# Patient Record
Sex: MALE | Race: WHITE | Employment: OTHER | URBAN - METROPOLITAN AREA
[De-identification: names, ages, dates, MRNs, and addresses within clinical notes are randomized per-mention and may not be internally consistent; named-entity substitution may affect disease eponyms.]

---

## 2017-03-31 DIAGNOSIS — F33.0 MAJOR DEPRESSIVE DISORDER, RECURRENT EPISODE, MILD (HCC): ICD-10-CM

## 2017-04-03 RX ORDER — CITALOPRAM 20 MG/1
TABLET ORAL
Qty: 90 TABLET | Refills: 3 | OUTPATIENT
Start: 2017-04-03

## 2017-07-13 ENCOUNTER — OFFICE VISIT (OUTPATIENT)
Dept: FAMILY MEDICINE CLINIC | Age: 57
End: 2017-07-13

## 2017-07-13 VITALS
SYSTOLIC BLOOD PRESSURE: 94 MMHG | WEIGHT: 255.4 LBS | HEIGHT: 70 IN | HEART RATE: 97 BPM | BODY MASS INDEX: 36.56 KG/M2 | TEMPERATURE: 101.2 F | OXYGEN SATURATION: 97 % | DIASTOLIC BLOOD PRESSURE: 70 MMHG

## 2017-07-13 DIAGNOSIS — R05.9 COUGH: ICD-10-CM

## 2017-07-13 DIAGNOSIS — R50.9 FEVER, UNSPECIFIED FEVER CAUSE: Primary | ICD-10-CM

## 2017-07-13 PROCEDURE — 1036F TOBACCO NON-USER: CPT | Performed by: FAMILY MEDICINE

## 2017-07-13 PROCEDURE — G8417 CALC BMI ABV UP PARAM F/U: HCPCS | Performed by: FAMILY MEDICINE

## 2017-07-13 PROCEDURE — G8427 DOCREV CUR MEDS BY ELIG CLIN: HCPCS | Performed by: FAMILY MEDICINE

## 2017-07-13 PROCEDURE — 3017F COLORECTAL CA SCREEN DOC REV: CPT | Performed by: FAMILY MEDICINE

## 2017-07-13 PROCEDURE — 99213 OFFICE O/P EST LOW 20 MIN: CPT | Performed by: FAMILY MEDICINE

## 2017-07-13 RX ORDER — ACETAMINOPHEN 325 MG/1
650 TABLET ORAL ONCE
Status: COMPLETED | OUTPATIENT
Start: 2017-07-13 | End: 2017-07-13

## 2017-07-13 RX ORDER — IBUPROFEN 600 MG/1
600 TABLET ORAL EVERY 6 HOURS PRN
Qty: 20 TABLET | Refills: 0 | Status: SHIPPED | OUTPATIENT
Start: 2017-07-13 | End: 2019-03-11 | Stop reason: ALTCHOICE

## 2017-07-13 RX ORDER — ACETAMINOPHEN 325 MG/1
1000 TABLET ORAL ONCE
Status: DISCONTINUED | OUTPATIENT
Start: 2017-07-13 | End: 2017-07-13

## 2017-07-13 RX ORDER — DOXYCYCLINE HYCLATE 100 MG
100 TABLET ORAL 2 TIMES DAILY
Qty: 20 TABLET | Refills: 0 | Status: SHIPPED | OUTPATIENT
Start: 2017-07-13 | End: 2017-07-23

## 2017-07-13 RX ADMIN — ACETAMINOPHEN 650 MG: 325 TABLET ORAL at 16:28

## 2017-07-13 ASSESSMENT — PATIENT HEALTH QUESTIONNAIRE - PHQ9
SUM OF ALL RESPONSES TO PHQ9 QUESTIONS 1 & 2: 0
2. FEELING DOWN, DEPRESSED OR HOPELESS: 0
1. LITTLE INTEREST OR PLEASURE IN DOING THINGS: 0
SUM OF ALL RESPONSES TO PHQ QUESTIONS 1-9: 0

## 2017-07-13 ASSESSMENT — ENCOUNTER SYMPTOMS
BACK PAIN: 0
EYE DISCHARGE: 0
COUGH: 1
ABDOMINAL PAIN: 0
NAUSEA: 0
WHEEZING: 0
SORE THROAT: 0
VOMITING: 0
DIARRHEA: 0
SINUS PRESSURE: 0
SHORTNESS OF BREATH: 0

## 2017-07-17 ENCOUNTER — TELEPHONE (OUTPATIENT)
Dept: FAMILY MEDICINE CLINIC | Age: 57
End: 2017-07-17

## 2017-07-26 DIAGNOSIS — F33.0 MAJOR DEPRESSIVE DISORDER, RECURRENT EPISODE, MILD (HCC): ICD-10-CM

## 2017-07-26 RX ORDER — CITALOPRAM 20 MG/1
20 TABLET ORAL DAILY
Qty: 90 TABLET | Refills: 0 | Status: SHIPPED | OUTPATIENT
Start: 2017-07-26 | End: 2017-10-16 | Stop reason: SDUPTHER

## 2017-09-14 ENCOUNTER — OFFICE VISIT (OUTPATIENT)
Dept: FAMILY MEDICINE CLINIC | Age: 57
End: 2017-09-14

## 2017-09-14 VITALS
HEART RATE: 62 BPM | WEIGHT: 262 LBS | SYSTOLIC BLOOD PRESSURE: 108 MMHG | OXYGEN SATURATION: 98 % | BODY MASS INDEX: 36.54 KG/M2 | DIASTOLIC BLOOD PRESSURE: 76 MMHG | TEMPERATURE: 98.5 F

## 2017-09-14 DIAGNOSIS — J40 BRONCHITIS: Primary | ICD-10-CM

## 2017-09-14 PROCEDURE — 1036F TOBACCO NON-USER: CPT | Performed by: NURSE PRACTITIONER

## 2017-09-14 PROCEDURE — 3017F COLORECTAL CA SCREEN DOC REV: CPT | Performed by: NURSE PRACTITIONER

## 2017-09-14 PROCEDURE — 99213 OFFICE O/P EST LOW 20 MIN: CPT | Performed by: NURSE PRACTITIONER

## 2017-09-14 PROCEDURE — G8417 CALC BMI ABV UP PARAM F/U: HCPCS | Performed by: NURSE PRACTITIONER

## 2017-09-14 PROCEDURE — G8427 DOCREV CUR MEDS BY ELIG CLIN: HCPCS | Performed by: NURSE PRACTITIONER

## 2017-09-14 RX ORDER — BENZONATATE 100 MG/1
100 CAPSULE ORAL 3 TIMES DAILY PRN
Qty: 20 CAPSULE | Refills: 0 | Status: SHIPPED | OUTPATIENT
Start: 2017-09-14 | End: 2017-09-21

## 2017-09-14 RX ORDER — METHYLPREDNISOLONE 4 MG/1
TABLET ORAL
Qty: 1 KIT | Refills: 0 | Status: SHIPPED | OUTPATIENT
Start: 2017-09-14 | End: 2017-09-20

## 2017-09-14 ASSESSMENT — ENCOUNTER SYMPTOMS
HEMOPTYSIS: 0
ABDOMINAL DISTENTION: 0
NAUSEA: 0
EYE PAIN: 0
SINUS PRESSURE: 0
RHINORRHEA: 0
DIARRHEA: 0
TROUBLE SWALLOWING: 0
WHEEZING: 1
SHORTNESS OF BREATH: 0
COUGH: 1
HEARTBURN: 0
CHEST TIGHTNESS: 0
SORE THROAT: 1
ABDOMINAL PAIN: 0

## 2018-02-06 ENCOUNTER — OFFICE VISIT (OUTPATIENT)
Dept: FAMILY MEDICINE CLINIC | Age: 58
End: 2018-02-06

## 2018-02-06 ENCOUNTER — TELEPHONE (OUTPATIENT)
Dept: SURGERY | Age: 58
End: 2018-02-06

## 2018-02-06 VITALS
DIASTOLIC BLOOD PRESSURE: 70 MMHG | OXYGEN SATURATION: 96 % | BODY MASS INDEX: 37.38 KG/M2 | HEIGHT: 71 IN | WEIGHT: 267 LBS | HEART RATE: 76 BPM | RESPIRATION RATE: 14 BRPM | SYSTOLIC BLOOD PRESSURE: 122 MMHG

## 2018-02-06 DIAGNOSIS — F33.0 MAJOR DEPRESSIVE DISORDER, RECURRENT EPISODE, MILD (HCC): ICD-10-CM

## 2018-02-06 DIAGNOSIS — K42.9 UMBILICAL HERNIA WITHOUT OBSTRUCTION AND WITHOUT GANGRENE: Primary | ICD-10-CM

## 2018-02-06 PROCEDURE — G8484 FLU IMMUNIZE NO ADMIN: HCPCS | Performed by: FAMILY MEDICINE

## 2018-02-06 PROCEDURE — 1036F TOBACCO NON-USER: CPT | Performed by: FAMILY MEDICINE

## 2018-02-06 PROCEDURE — 99213 OFFICE O/P EST LOW 20 MIN: CPT | Performed by: FAMILY MEDICINE

## 2018-02-06 PROCEDURE — 3017F COLORECTAL CA SCREEN DOC REV: CPT | Performed by: FAMILY MEDICINE

## 2018-02-06 PROCEDURE — G8427 DOCREV CUR MEDS BY ELIG CLIN: HCPCS | Performed by: FAMILY MEDICINE

## 2018-02-06 PROCEDURE — G8417 CALC BMI ABV UP PARAM F/U: HCPCS | Performed by: FAMILY MEDICINE

## 2018-02-06 RX ORDER — CITALOPRAM 20 MG/1
20 TABLET ORAL DAILY
Qty: 90 TABLET | Refills: 3 | Status: SHIPPED | OUTPATIENT
Start: 2018-02-06 | End: 2019-03-11 | Stop reason: SDUPTHER

## 2018-02-06 NOTE — PROGRESS NOTES
Plan:   Patient is now having symptomatic umbilical hernia with his heavy lifting during his current projects. We discussed the risks of symptomatic umbilical hernias. CT has been ordered as well as referral to surgery for evaluation on surgical timing. In the meanwhile he will try and avoid any heavy lifting and report any increased cough or bowel habits straining to minimize the risk of strangulation of the hernia. The Celexa is doing well for his moods we will continue his current medication. He'll also work on his weight loss that he is gaining weight since last time he was here. Diagnosis Assessment and Associated Orders:    1. Umbilical hernia without obstruction and without gangrene  CT ABDOMEN PELVIS W IV CONTRAST Additional Contrast? Radiologist Recommendation    Carson Langley MD   2. Major depressive disorder, recurrent episode, mild (HCC)  citalopram (CELEXA) 20 MG tablet   3. Obesity, Class II, BMI 35-39.9, with comorbidity ([facility] [address]             All patient questions answered. Pt voiced understanding. Return in about 1 year (around 2/6/2019) for moods or sooner if needed . -----------------------------------------------------------------------------------------------            Chief Complaint   Patient presents with    Medication Refill    Hernia     Patient reports that when he is doing his heavy lifting he is finding some pain in his him up umbilical hernia that radiates up towards his mid chest.  No vomiting, nausea, bloody stools, diarrhea with the symptoms. The pain is very transient for a few seconds and as soon as he stops lifting the pain goes away. The pain does not wake him up in the middle of the night. He is also here for refills for Celexa. His moods are doing well on his current dose. He denies any suicidal or homicidal ideation. ROS: Pertinent items are noted in HPI. All other ROS negative     reports that he has never smoked.  He has quit using smokeless tobacco.   reports that he drinks about 4.8 - 7.2 oz of alcohol per week . Physical Exam   Nursing note reviewed  /70 (Site: Left Arm, Position: Sitting, Cuff Size: Large Adult)   Pulse 76   Resp 14   Ht 5' 11\" (1.803 m)   Wt 267 lb (121.1 kg)   SpO2 96%   BMI 37.24 kg/m²   BP Readings from Last 3 Encounters:   02/06/18 122/70   09/14/17 108/76   07/13/17 94/70     Wt Readings from Last 3 Encounters:   02/06/18 267 lb (121.1 kg)   12/14/17 265 lb (120.2 kg)   09/14/17 262 lb (118.8 kg)     Physical Exam   Constitutional: He is oriented to person, place, and time. He appears well-developed and well-nourished. HENT:   Mouth/Throat: Oropharynx is clear and moist.   Eyes: Conjunctivae and EOM are normal. Pupils are equal, round, and reactive to light. Neck: Neck supple. Cardiovascular: Normal rate, regular rhythm and normal heart sounds. Pulmonary/Chest: Effort normal and breath sounds normal.   Abdominal: Soft. Bowel sounds are normal.       Neurological: He is alert and oriented to person, place, and time. Skin: Skin is warm and dry. Psychiatric: He has a normal mood and affect. His speech is normal and behavior is normal. His mood appears not anxious.          Assessment and Plan: See above

## 2018-02-17 ENCOUNTER — HOSPITAL ENCOUNTER (OUTPATIENT)
Dept: CT IMAGING | Age: 58
Discharge: HOME | End: 2018-02-17
Attending: FAMILY MEDICINE | Admitting: FAMILY MEDICINE

## 2018-02-17 DIAGNOSIS — K42.9 UMBILICAL HERNIA WITHOUT OBSTRUCTION AND WITHOUT GANGRENE: ICD-10-CM

## 2018-02-17 DIAGNOSIS — K42.9 UMBILICAL HERNIA WITHOUT OBSTRUCTION OR GANGRENE: ICD-10-CM

## 2018-02-23 PROBLEM — K57.30 DIVERTICULOSIS OF LARGE INTESTINE: Status: ACTIVE | Noted: 2018-02-23

## 2018-02-23 PROBLEM — K76.0 HEPATIC STEATOSIS: Status: ACTIVE | Noted: 2018-02-23

## 2018-03-07 ENCOUNTER — OFFICE VISIT (OUTPATIENT)
Dept: SURGERY | Age: 58
End: 2018-03-07

## 2018-03-07 VITALS
HEIGHT: 71 IN | SYSTOLIC BLOOD PRESSURE: 122 MMHG | BODY MASS INDEX: 37.38 KG/M2 | DIASTOLIC BLOOD PRESSURE: 84 MMHG | WEIGHT: 266.98 LBS

## 2018-03-07 DIAGNOSIS — K42.9 UMBILICAL HERNIA WITHOUT OBSTRUCTION AND WITHOUT GANGRENE: Primary | ICD-10-CM

## 2018-03-07 PROCEDURE — G8417 CALC BMI ABV UP PARAM F/U: HCPCS | Performed by: SURGERY

## 2018-03-07 PROCEDURE — 99204 OFFICE O/P NEW MOD 45 MIN: CPT | Performed by: SURGERY

## 2018-03-07 PROCEDURE — G8427 DOCREV CUR MEDS BY ELIG CLIN: HCPCS | Performed by: SURGERY

## 2018-03-07 PROCEDURE — G8484 FLU IMMUNIZE NO ADMIN: HCPCS | Performed by: SURGERY

## 2018-03-07 PROCEDURE — 3017F COLORECTAL CA SCREEN DOC REV: CPT | Performed by: SURGERY

## 2018-03-07 PROCEDURE — 1036F TOBACCO NON-USER: CPT | Performed by: SURGERY

## 2018-03-07 ASSESSMENT — ENCOUNTER SYMPTOMS
CHOKING: 0
APNEA: 0
BACK PAIN: 0
SORE THROAT: 0
PHOTOPHOBIA: 0
ABDOMINAL PAIN: 1
EYE ITCHING: 0
STRIDOR: 0
CONSTIPATION: 0
COLOR CHANGE: 0
EYE REDNESS: 0
RECTAL PAIN: 0
ANAL BLEEDING: 0

## 2018-03-07 NOTE — PROGRESS NOTES
 Marital status:      Spouse name: N/A    Number of children: N/A    Years of education: N/A     Occupational History    retired [address] D     32 years [employer] (retired 2015)     Social History Main Topics    Smoking status: Never Smoker    Smokeless tobacco: Former User    Alcohol use 4.8 - 7.2 oz/week     8 - 12 Cans of beer per week      Comment: 3-4 cans soda daily    Drug use: No    Sexual activity: Yes     Partners: Female     Other Topics Concern    Not on file     Social History Narrative    Lives with wife, twin sons               Current Outpatient Prescriptions   Medication Sig Dispense Refill    Misc Natural Products (GLUCOSAMINE CHOND COMPLEX/MSM PO) Take by mouth      citalopram (CELEXA) 20 MG tablet Take 1 tablet by mouth daily 90 tablet 3    albuterol sulfate HFA (PROVENTIL HFA) 108 (90 BASE) MCG/ACT inhaler Inhale 2 puffs into the lungs every 4 hours as needed for Wheezing Can substitute albuterol inhaler per formulary 1 Inhaler 6    fish oil-omega-3 fatty acids 1000 MG capsule Take 2 g by mouth daily. (Takes 3-4 per day)      ibuprofen (ADVIL;MOTRIN) 600 MG tablet Take 1 tablet by mouth every 6 hours as needed for Pain 20 tablet 0     No current facility-administered medications for this visit. No Known Allergies    Review of Systems:         Review of Systems   Constitutional: Negative for chills and fever. HENT: Negative for ear pain, mouth sores, sore throat and tinnitus. Eyes: Negative for photophobia, redness and itching. Respiratory: Negative for apnea, choking and stridor. Cardiovascular: Negative for chest pain and palpitations. Gastrointestinal: Positive for abdominal pain. Negative for anal bleeding, constipation and rectal pain. Endocrine: Negative for polydipsia. Genitourinary: Negative for enuresis, flank pain and hematuria. Musculoskeletal: Negative for back pain, joint swelling and myalgias.    Skin: Negative for color change and pallor. Allergic/Immunologic: Negative for environmental allergies. Neurological: Negative for syncope and speech difficulty. Psychiatric/Behavioral: Negative for confusion and hallucinations. OBJECTIVE:  Physical Exam:    /84   Ht 5' 10.98\" (1.803 m)   Wt 266 lb 15.6 oz (121.1 kg)   BMI 37.25 kg/m²      Physical Exam   Constitutional: He is oriented to person, place, and time. He appears well-developed and well-nourished. HENT:   Head: Normocephalic. Eyes: Pupils are equal, round, and reactive to light. Neck: Normal range of motion. Neck supple. Cardiovascular: Normal rate. Pulmonary/Chest: Effort normal.   Abdominal: Soft. He exhibits no distension and no mass. There is tenderness. There is no rebound and no guarding. Musculoskeletal: Normal range of motion. Neurological: He is alert and oriented to person, place, and time. Skin: Skin is warm. Psychiatric: He has a normal mood and affect. ASSESSMENT:  1. Umbilical hernia without obstruction and without gangrene          PLAN:  Treatment:  Will proceed with open umbilical hernia repair. Patient counseled on risks, benefits, and alternatives of treatment plan at length. Patient states an understanding and willingness to proceed with plan. No orders of the defined types were placed in this encounter. No orders of the defined types were placed in this encounter. Follow Up:  Return in about 2 weeks (around 3/21/2018).       Olu Doty MD

## 2018-03-13 ENCOUNTER — TELEPHONE (OUTPATIENT)
Dept: SURGERY | Age: 58
End: 2018-03-13

## 2018-03-13 NOTE — TELEPHONE ENCOUNTER
Scheduled Open Umbical Hernia Repair at [facility]  Surgery: 4/18/18 at 0800. PAT: by phone. P/O appt: 4/30/18 at 1000 at [address]: NPO after midnight. Called and left message for Slime Isaacs to call back.  sent.

## 2018-03-28 ENCOUNTER — TELEPHONE (OUTPATIENT)
Dept: SURGERY | Age: 58
End: 2018-03-28

## 2018-04-30 ENCOUNTER — OFFICE VISIT (OUTPATIENT)
Dept: SURGERY | Age: 58
End: 2018-04-30

## 2018-04-30 VITALS
HEIGHT: 71 IN | WEIGHT: 263.89 LBS | SYSTOLIC BLOOD PRESSURE: 124 MMHG | DIASTOLIC BLOOD PRESSURE: 88 MMHG | HEART RATE: 85 BPM | BODY MASS INDEX: 36.94 KG/M2

## 2018-04-30 DIAGNOSIS — K42.9 UMBILICAL HERNIA WITHOUT OBSTRUCTION AND WITHOUT GANGRENE: Primary | ICD-10-CM

## 2018-04-30 PROCEDURE — 99024 POSTOP FOLLOW-UP VISIT: CPT | Performed by: PHYSICIAN ASSISTANT

## 2018-05-17 ENCOUNTER — OFFICE VISIT (OUTPATIENT)
Dept: SURGERY | Age: 58
End: 2018-05-17

## 2018-05-17 VITALS
WEIGHT: 263.89 LBS | DIASTOLIC BLOOD PRESSURE: 88 MMHG | HEIGHT: 71 IN | SYSTOLIC BLOOD PRESSURE: 120 MMHG | BODY MASS INDEX: 36.94 KG/M2

## 2018-05-17 DIAGNOSIS — K42.9 UMBILICAL HERNIA WITHOUT OBSTRUCTION AND WITHOUT GANGRENE: ICD-10-CM

## 2018-05-17 DIAGNOSIS — Z12.11 COLON CANCER SCREENING: Primary | ICD-10-CM

## 2018-05-17 PROCEDURE — 99999 PR OFFICE/OUTPT VISIT,PROCEDURE ONLY: CPT | Performed by: PHYSICIAN ASSISTANT

## 2018-05-17 PROCEDURE — 99024 POSTOP FOLLOW-UP VISIT: CPT | Performed by: PHYSICIAN ASSISTANT

## 2018-05-17 RX ORDER — SODIUM, POTASSIUM,MAG SULFATES 17.5-3.13G
1 SOLUTION, RECONSTITUTED, ORAL ORAL ONCE
Qty: 2 BOTTLE | Refills: 0 | Status: SHIPPED | OUTPATIENT
Start: 2018-05-17 | End: 2018-05-17

## 2018-05-17 ASSESSMENT — ENCOUNTER SYMPTOMS
DOUBLE VISION: 0
ORTHOPNEA: 0
COUGH: 0
SPUTUM PRODUCTION: 0
PHOTOPHOBIA: 0
HEARTBURN: 0
NAUSEA: 0
HEMOPTYSIS: 0
BACK PAIN: 0
VOMITING: 0
BLURRED VISION: 0

## 2018-06-16 PROBLEM — Z12.11 COLON CANCER SCREENING: Status: RESOLVED | Noted: 2018-05-17 | Resolved: 2018-06-16

## 2018-09-19 ENCOUNTER — OFFICE VISIT (OUTPATIENT)
Dept: SURGERY | Age: 58
End: 2018-09-19

## 2018-09-19 ENCOUNTER — HOSPITAL ENCOUNTER (OUTPATIENT)
Dept: OTHER | Age: 58
Discharge: HOME | End: 2018-09-19
Attending: SURGERY | Admitting: SURGERY

## 2018-09-19 VITALS
HEIGHT: 71 IN | WEIGHT: 260 LBS | SYSTOLIC BLOOD PRESSURE: 122 MMHG | DIASTOLIC BLOOD PRESSURE: 88 MMHG | BODY MASS INDEX: 36.4 KG/M2 | HEART RATE: 80 BPM

## 2018-09-19 DIAGNOSIS — M79.601 PAIN OF RIGHT UPPER EXTREMITY: ICD-10-CM

## 2018-09-19 DIAGNOSIS — D17.21 LIPOMA OF RIGHT UPPER EXTREMITY: Primary | ICD-10-CM

## 2018-09-19 PROCEDURE — 11402 EXC TR-EXT B9+MARG 1.1-2 CM: CPT | Performed by: SURGERY

## 2018-10-09 ASSESSMENT — ENCOUNTER SYMPTOMS
STRIDOR: 0
CONSTIPATION: 0
SORE THROAT: 0
RECTAL PAIN: 0
ANAL BLEEDING: 0
CHOKING: 0
COLOR CHANGE: 0
APNEA: 0
EYE REDNESS: 0
EYE ITCHING: 0
PHOTOPHOBIA: 0
BACK PAIN: 0

## 2018-11-07 ENCOUNTER — TELEPHONE (OUTPATIENT)
Dept: FAMILY MEDICINE CLINIC | Age: 58
End: 2018-11-07

## 2018-11-07 ENCOUNTER — HOSPITAL ENCOUNTER (EMERGENCY)
Age: 58
Discharge: HOME | End: 2018-11-07
Attending: EMERGENCY MEDICINE
Payer: COMMERCIAL

## 2018-11-07 VITALS
HEART RATE: 50 BPM | SYSTOLIC BLOOD PRESSURE: 146 MMHG | DIASTOLIC BLOOD PRESSURE: 92 MMHG | TEMPERATURE: 96.5 F | RESPIRATION RATE: 16 BRPM | OXYGEN SATURATION: 97 %

## 2018-11-07 DIAGNOSIS — S39.012A ACUTE MYOFASCIAL STRAIN OF LUMBAR REGION, INITIAL ENCOUNTER: Primary | ICD-10-CM

## 2018-11-07 DIAGNOSIS — S33.5XXA SPRAIN OF LOW BACK, INITIAL ENCOUNTER: Primary | ICD-10-CM

## 2018-11-07 PROCEDURE — 6370000000 HC RX 637 (ALT 250 FOR IP): Performed by: EMERGENCY MEDICINE

## 2018-11-07 PROCEDURE — 6360000002 HC RX W HCPCS: Performed by: EMERGENCY MEDICINE

## 2018-11-07 PROCEDURE — 99283 EMERGENCY DEPT VISIT LOW MDM: CPT

## 2018-11-07 PROCEDURE — 96372 THER/PROPH/DIAG INJ SC/IM: CPT

## 2018-11-07 RX ORDER — DIAZEPAM 10 MG/1
10 TABLET ORAL EVERY 8 HOURS PRN
Qty: 10 TABLET | Refills: 0 | Status: SHIPPED | OUTPATIENT
Start: 2018-11-07 | End: 2018-11-17

## 2018-11-07 RX ORDER — HYDROCODONE BITARTRATE AND ACETAMINOPHEN 5; 325 MG/1; MG/1
1-2 TABLET ORAL EVERY 4 HOURS PRN
Qty: 15 TABLET | Refills: 0 | Status: SHIPPED | OUTPATIENT
Start: 2018-11-07 | End: 2018-11-10

## 2018-11-07 RX ORDER — ACETAMINOPHEN 325 MG/1
650 TABLET ORAL EVERY 6 HOURS PRN
COMMUNITY
End: 2022-02-15

## 2018-11-07 RX ORDER — NAPROXEN 500 MG/1
500 TABLET ORAL 2 TIMES DAILY PRN
Qty: 20 TABLET | Refills: 0 | Status: SHIPPED | OUTPATIENT
Start: 2018-11-07 | End: 2018-11-12 | Stop reason: SDUPTHER

## 2018-11-07 RX ORDER — KETOROLAC TROMETHAMINE 30 MG/ML
60 INJECTION, SOLUTION INTRAMUSCULAR; INTRAVENOUS ONCE
Status: COMPLETED | OUTPATIENT
Start: 2018-11-07 | End: 2018-11-07

## 2018-11-07 RX ORDER — MORPHINE SULFATE 10 MG/ML
6 INJECTION, SOLUTION INTRAMUSCULAR; INTRAVENOUS ONCE
Status: COMPLETED | OUTPATIENT
Start: 2018-11-07 | End: 2018-11-07

## 2018-11-07 RX ORDER — DIAZEPAM 5 MG/1
5 TABLET ORAL ONCE
Status: COMPLETED | OUTPATIENT
Start: 2018-11-07 | End: 2018-11-07

## 2018-11-07 RX ADMIN — MORPHINE SULFATE 6 MG: 10 INJECTION INTRAMUSCULAR; INTRAVENOUS; SUBCUTANEOUS at 13:35

## 2018-11-07 RX ADMIN — KETOROLAC TROMETHAMINE 60 MG: 30 INJECTION, SOLUTION INTRAMUSCULAR at 12:06

## 2018-11-07 RX ADMIN — DIAZEPAM 5 MG: 5 TABLET ORAL at 12:07

## 2018-11-07 ASSESSMENT — PAIN SCALES - GENERAL
PAINLEVEL_OUTOF10: 2
PAINLEVEL_OUTOF10: 7

## 2018-11-07 NOTE — ED NOTES
Patient given AVS, discharge instructions and prescriptions. Verbalizes understanding no further needs identified at this time.      Wilber Tobias RN  11/07/18 0853

## 2018-11-07 NOTE — ED NOTES
States he is feeling better. When attempting to stand at bedside, pts c/o pain and right leg starts to buckle. Dr Willie Parsons notified.      Silvano Maldonado RN  11/07/18 [id number]

## 2018-11-07 NOTE — ED PROVIDER NOTES
of Onset    Cancer Mother         cervical primary with spread    Cancer Father 79        brain-     Diabetes Father     Heart Disease Father     High Blood Pressure Father     High Cholesterol Father     Mental Illness Sister     Migraines Sister     Seizures Son     Cancer Maternal Aunt     High Blood Pressure Paternal Grandmother     Stroke Paternal Grandmother     Allergies Daughter     Asthma Daughter     Depression Daughter     Cancer Paternal Grandfather         leukemia     Social History     Social History    Marital status:      Spouse name: N/A    Number of children: N/A    Years of education: N/A     Occupational History    retired [address] [facility] D D     32 years [employer] (retired 2015)     Social History Main Topics    Smoking status: Never Smoker    Smokeless tobacco: Former User    Alcohol use 4.8 - 7.2 oz/week     8 - 12 Cans of beer per week      Comment: 3-4 cans soda daily    Drug use: No    Sexual activity: Yes     Partners: Female     Other Topics Concern    Not on file     Social History Narrative    Lives with wife, twin sons             No current facility-administered medications for this encounter. Current Outpatient Prescriptions   Medication Sig Dispense Refill    acetaminophen (TYLENOL) 325 MG tablet Take 650 mg by mouth every 6 hours as needed for Pain      diazepam (VALIUM) 10 MG tablet Take 1 tablet by mouth every 8 hours as needed (muscle spasm) for up to 10 days. . 10 tablet 0    naproxen (NAPROSYN) 500 MG tablet Take 1 tablet by mouth 2 times daily as needed for Pain 20 tablet 0    HYDROcodone-acetaminophen (NORCO) 5-325 MG per tablet Take 1-2 tablets by mouth every 4 hours as needed for Pain for up to 3 days. . 15 tablet 0    Misc Natural Products (GLUCOSAMINE CHOND COMPLEX/MSM PO) Take by mouth      citalopram (CELEXA) 20 MG tablet Take 1 tablet by mouth daily 90 tablet 3    ibuprofen (ADVIL;MOTRIN) 600 MG tablet injury/pathology, such as, but not limited to, cauda equina syndrome, acute spinal cord pathology, epidural abscess, obstructive uropathy, or UTI/pyelonephritis. I do not believe that further testing or imaging is warranted at this time. The likelihood of other entities in the differential is insufficient to justify any further testing for them. This was explained to the patient. The patient was advised that persistent or worsening symptoms would require further evaluation. Patient is given Toradol and Valium initially without complete relief. Following morphine, patient states his symptoms are much improved and he is not able to ambulate. Does report some ongoing discomfort. I do believe the patient is a good candidate for outpatient symptomatic treatment. The patient was instructed on this treatment, and anticipated clinical course for this problem. Patient is given instructions regarding symptomatic care at home as well as return precautions. To follow up with PCP for recheck in 2-3 days. Patient verbalizes understanding of all instructions and is comfortable with the plan of care. Clinical Impression:  1. Acute myofascial strain of lumbar region, initial encounter      Disposition referral (if applicable):  Regena Felty, MD   [facility] [address]  [address] [facility]   [phone]    Schedule an appointment as soon as possible for a visit in 2 days      [facility]  [address]  [phone]    If symptoms worsen    Disposition medications (if applicable):  New Prescriptions    DIAZEPAM (VALIUM) 10 MG TABLET    Take 1 tablet by mouth every 8 hours as needed (muscle spasm) for up to 10 days. Kristal Alexandre HYDROCODONE-ACETAMINOPHEN (NORCO) 5-325 MG PER TABLET    Take 1-2 tablets by mouth every 4 hours as needed for Pain for up to 3 days. Kristal Alexandre     NAPROXEN (NAPROSYN) 500 MG TABLET    Take 1 tablet by mouth 2 times daily as needed for

## 2018-11-08 RX ORDER — TIZANIDINE 4 MG/1
4 TABLET ORAL 4 TIMES DAILY PRN
Qty: 40 TABLET | Refills: 0 | Status: SHIPPED | OUTPATIENT
Start: 2018-11-08 | End: 2018-11-12 | Stop reason: SDUPTHER

## 2018-11-12 ENCOUNTER — OFFICE VISIT (OUTPATIENT)
Dept: FAMILY MEDICINE CLINIC | Age: 58
End: 2018-11-12
Payer: COMMERCIAL

## 2018-11-12 VITALS
OXYGEN SATURATION: 98 % | HEIGHT: 75 IN | SYSTOLIC BLOOD PRESSURE: 118 MMHG | WEIGHT: 270.2 LBS | HEART RATE: 75 BPM | BODY MASS INDEX: 33.6 KG/M2 | DIASTOLIC BLOOD PRESSURE: 82 MMHG

## 2018-11-12 DIAGNOSIS — S33.5XXA SPRAIN OF LOW BACK, INITIAL ENCOUNTER: ICD-10-CM

## 2018-11-12 PROCEDURE — 99213 OFFICE O/P EST LOW 20 MIN: CPT | Performed by: FAMILY MEDICINE

## 2018-11-12 PROCEDURE — G8482 FLU IMMUNIZE ORDER/ADMIN: HCPCS | Performed by: FAMILY MEDICINE

## 2018-11-12 PROCEDURE — G8417 CALC BMI ABV UP PARAM F/U: HCPCS | Performed by: FAMILY MEDICINE

## 2018-11-12 PROCEDURE — G8427 DOCREV CUR MEDS BY ELIG CLIN: HCPCS | Performed by: FAMILY MEDICINE

## 2018-11-12 PROCEDURE — 3017F COLORECTAL CA SCREEN DOC REV: CPT | Performed by: FAMILY MEDICINE

## 2018-11-12 PROCEDURE — 1036F TOBACCO NON-USER: CPT | Performed by: FAMILY MEDICINE

## 2018-11-12 RX ORDER — NAPROXEN 500 MG/1
500 TABLET ORAL 2 TIMES DAILY PRN
Qty: 20 TABLET | Refills: 1 | Status: SHIPPED | OUTPATIENT
Start: 2018-11-12 | End: 2019-03-11 | Stop reason: ALTCHOICE

## 2018-11-12 RX ORDER — METHYLPREDNISOLONE 4 MG/1
TABLET ORAL
Qty: 1 KIT | Refills: 0 | Status: SHIPPED | OUTPATIENT
Start: 2018-11-12 | End: 2019-03-11 | Stop reason: ALTCHOICE

## 2018-11-12 RX ORDER — TIZANIDINE 4 MG/1
4 TABLET ORAL 4 TIMES DAILY PRN
Qty: 40 TABLET | Refills: 1 | Status: SHIPPED | OUTPATIENT
Start: 2018-11-12 | End: 2019-03-11 | Stop reason: ALTCHOICE

## 2018-11-15 ENCOUNTER — HOSPITAL ENCOUNTER (OUTPATIENT)
Dept: PHYSICAL THERAPY | Age: 58
Setting detail: THERAPIES SERIES
Discharge: HOME | End: 2018-11-15
Payer: COMMERCIAL

## 2018-11-15 PROCEDURE — 97110 THERAPEUTIC EXERCISES: CPT

## 2018-11-15 PROCEDURE — G0283 ELEC STIM OTHER THAN WOUND: HCPCS

## 2018-11-15 PROCEDURE — G8982 BODY POS GOAL STATUS: HCPCS

## 2018-11-15 PROCEDURE — 97161 PT EVAL LOW COMPLEX 20 MIN: CPT

## 2018-11-15 PROCEDURE — G8981 BODY POS CURRENT STATUS: HCPCS

## 2018-11-15 ASSESSMENT — PAIN DESCRIPTION - PAIN TYPE: TYPE: ACUTE PAIN

## 2018-11-15 ASSESSMENT — PAIN DESCRIPTION - FREQUENCY: FREQUENCY: INTERMITTENT

## 2018-11-15 ASSESSMENT — PAIN SCALES - GENERAL: PAINLEVEL_OUTOF10: 1

## 2018-11-15 ASSESSMENT — PAIN DESCRIPTION - PROGRESSION: CLINICAL_PROGRESSION: GRADUALLY IMPROVING

## 2018-11-15 ASSESSMENT — PAIN DESCRIPTION - DESCRIPTORS: DESCRIPTORS: STABBING

## 2018-11-15 ASSESSMENT — PAIN DESCRIPTION - LOCATION: LOCATION: BACK

## 2018-11-15 ASSESSMENT — PAIN DESCRIPTION - ORIENTATION: ORIENTATION: RIGHT

## 2018-11-15 NOTE — PROGRESS NOTES
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 Pt now has difficulties completing lifting objects, ADLs and hobbies with the kids . Pt demo deficits this date that include paraspinal stiffness, hip/paraspinal weakness, guarding behaviors and minimal pain. Testing this date indicate signs and symptoms of lumbar strain. Pt will benefit with PT services with progression of strength/ROM, manual, and modalities to return to [facility]. Pt prior to onset of current condition had no pain with able to complete full ADLs and work activities. Patient agrees with established plan of care and assisted in the development of their short term and long term goals. Patient had no adverse reaction with initial treatment and there are no barriers to learning. Demonstrates no mental or cognitive disorder. Treatment Diagnosis: Low back flexibility restrictions, core weakness, low back pain  Prognosis: Good  Decision Making: Low Complexity  REQUIRES PT FOLLOW UP: Yes  Activity Tolerance  Activity Tolerance: Patient Tolerated treatment well         Plan   Plan  Times per week: 2  Plan weeks: 5  Specific instructions for Next Treatment: Review HEP, core stability training, hip/low back paraspinal stretching, core stabilization on table until symptom improvement with transition to standing with pulling/pushing resistance. Estim/heat for pain. Current Treatment Recommendations: Strengthening, ROM, Manual Therapy - Soft Tissue Mobilization, Aquatics, Home Exercise Program, Neuromuscular Re-education, Modalities    G-Code  PT G-Codes  Functional Assessment Tool Used: Oswestry   Score: 34% disability   Functional Limitation: Changing and maintaining body position  Changing and Maintaining Body Position Current Status ():  At least 20 percent but less than 40 percent impaired, limited or restricted  Changing and Maintaining Body Position Goal Status (): At least 1 percent but less than 20 percent impaired, limited or restricted    Goals  Short term goals  Time Frame for Short term goals: Defer to Long Term Goals. Long term goals  Time Frame for Long term goals : 5 weeks 12/22/18   Long term goal 1: Pt will demo I with HEP/symptom management. Long term goal 2: Pt will demo normal lumbar AROM with min/no pain to ease completing light lifting from the ground. Long term goal 3: Pt will complete transfers and ambulate with proper gait with min/no pain. Long term goal 4: Pt will report <20% disability per Oswestry. Long term goal 5: Pt will report >80% return to PLOF with daily activities. Patient Goals   Patient goals : return to normal level of function without pain.         Darylene Hoes, PT, DPT, OCS    11/15/2018 10:09 AM

## 2018-11-15 NOTE — FLOWSHEET NOTE
Impaired, Indep.)  [] CI (1-19% Impaired, SBA-CGA)  [x] CJ (20-39% Impaired, MIN A)  [] CK  (40-59% Impairment, Mod A)  [] CL  (60-79% Impairment, Max A)  [] CM  (80-99% Impairment, Dep.)   [] CN  (100% Impairment, Tot Dep.) [] CH (0% Impaired, Indep.)  [x] CI (1-19% Impaired, SBA-CGA)  [] CJ (20-39% Impaired, MIN A)  [] CK  (40-59% Impairment, Mod A)  [] CL  (60-79% Impairment, Max A)  [] CM  (80-99% Impairment, Dep.)   [] CN  (100% Impairment, Tot Dep.)  [] CH (0% Impaired, Indep.)  [] CI (1-19% Impaired, SBA-CGA)  [] CJ (20-39% Impaired, MIN A)  [] CK  (40-59% Impairment, Mod A)  [] CL  (60-79% Impairment, Max A)  [] CM  (80-99% Impairment, Dep.)   [] CN  (100% Impairment, Tot Dep.)              Summary:Pt is 62year old male with acute low back pain strain after delayed onset of pain 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 Pt now has difficulties completing lifting objects, ADLs and hobbies with the kids . Pt demo deficits this date that include paraspinal stiffness, hip/paraspinal weakness, guarding behaviors and minimal pain. Testing this date indicate signs and symptoms of lumbar strain. Pt will benefit with PT services with progression of strength/ROM, manual, and modalities to return to PLOF. Pt prior to onset of current condition had no pain with able to complete full ADLs and work activities. Patient agrees with established plan of care and assisted in the development of their short term and long term goals. Patient had no adverse reaction with initial treatment and there are no barriers to learning. Demonstrates no mental or cognitive disorder. Subjective:  See eval       Any changes in Ambulatory Summary Sheet?   See eval      Objective:  See eval         Exercises:  Exercise/Equipment 11/15/18 Date Date Date            Warm-up                       TABLE         TE         DKTC 15\"x4      LTR 10x2  3\"      Dusty Agudelo will benefit with PT services with progression of strength/ROM, manual, and modalities to return to PLOF. Pt prior to onset of current condition had no pain with able to complete full ADLs and work activities. Patient agrees with established plan of care and assisted in the development of their short term and long term goals. Patient had no adverse reaction with initial treatment and there are no barriers to learning. Demonstrates no mental or cognitive disorder. Time In / Time Out:   0830/0930                Timed Code/Total Treatment Minutes:  15/60'    15' TE, 12' unatteded E-stim, 1 PT eval     Patients Report of Tolerance:    [x] Patient limited by fatigue        [] Patient limited by pain   [] Patient limited by other medical complications   [] Other:     Prognosis:   [x] Good [] Fair  [] Poor    Plan:   [] Continue per plan of care [] Alter current plan (see comments)  [x] Plan of care initiated [] Hold pending MD visit [] Discharge    Plan for Next Session:  Review HEP, core stability training, hip/low back paraspinal stretching, core stabilization on table until symptom improvement with transition to standing with pulling/pushing resistance. Estim/heat for pain.          Next Progress Note due:  Radhaal 11/15/18    Visit 10           Electronically signed by:  Maggie Nava, PT, DPT, OCS  11/15/2018, 6:48 AM        11/15/2018 6:49 AM

## 2018-11-15 NOTE — PLAN OF CARE
Exercise  [x] Modalities:  [x] Therapeutic Activity     [] Ultrasound  [x] Electrical Stimulation  [x] Gait Training      [] Cervical Traction [] Lumbar Traction  [x] Neuromuscular Re-education    [x] Cold/hotpack [] Iontophoresis   [x] Instruction in HEP      [] Vasopneumatic     [x] Manual Therapy               [] Aquatic Therapy       Other:    ? Frequency/Duration:  # Days per week: [] 1 day # Weeks: [] 1 week [x] 5 weeks     [x] 2 days? [] 2 weeks [] 6 weeks     [] 3 days   [] 3 weeks [] 7 weeks     [] 4 days   [] 4 weeks [] 8 weeks         [] 9 weeks [] 10 weeks         [] 11 weeks [] 12 weeks    Rehab Potential/Progress: [] Excellent [x] Good [] Fair  [] Poor     Goals:      Short term goals  Time Frame for Short term goals: Defer to Long Term Goals. Long term goals  Time Frame for Long term goals : 5 weeks 12/22/18   Long term goal 1: Pt will demo I with HEP/symptom management. Long term goal 2: Pt will demo normal lumbar AROM with min/no pain to ease completing light lifting from the ground. Long term goal 3: Pt will complete transfers and ambulate with proper gait with min/no pain. Long term goal 4: Pt will report <20% disability per Oswestry. Long term goal 5: Pt will report >80% return to PLOF with daily activities.      G-Code Selection: (On Eval and every 10th visit or Discharge)  MEASURE  [] Mobility: Walking and Moving Around     [] Current ()   [] Goal ()   [] DC ()  [x] Changing/Maintaining Body Position     [x] Current (2359)      [x] Goal ()   [] DC ()  [] Carrying / Moving / Handling Objects     [] Current ()   [] Goal ()   [] DC ()  [] Self-Care     [] Current ()   [] Goal ()   [] DC ()  [] Other PT/OT primary DX     [] Current ()   [] Goal ()   [] DC ()    SEVERITY  CURRENT  GOAL  DISCHARGE   [] CH (0% Impaired, Indep.)  [] CI (1-19% Impaired, SBA-CGA)  [x] CJ (20-39% Impaired, MIN A)  [] CK  (40-59% Impairment, Mod A)  [] CL  (60-79% Impairment, Max A)  [] CM  (80-99% Impairment, Dep.)   [] CN  (100% Impairment, Tot Dep.) [] CH (0% Impaired, Indep.)  [x] CI (1-19% Impaired, SBA-CGA)  [] CJ (20-39% Impaired, MIN A)  [] CK  (40-59% Impairment, Mod A)  [] CL  (60-79% Impairment, Max A)  [] CM  (80-99% Impairment, Dep.)   [] CN  (100% Impairment, Tot Dep.)  [] CH (0% Impaired, Indep.)  [] CI (1-19% Impaired, SBA-CGA)  [] CJ (20-39% Impaired, MIN A)  [] CK  (40-59% Impairment, Mod A)  [] CL  (60-79% Impairment, Max A)  [] CM  (80-99% Impairment, Dep.)   [] CN  (100% Impairment, Tot Dep.)          Electronically signed by:  Hayley Lebron PT, 11/15/2018, 11:40 AM      11/15/2018 11:40 AM       If you have any questions or concerns, please don't hesitate to call.   Thank you for your referral.      Physician Signature:________________________________Date:_________ TIME: _____  By signing above, therapists plan is approved by physician

## 2018-12-05 ENCOUNTER — HOSPITAL ENCOUNTER (OUTPATIENT)
Dept: PHYSICAL THERAPY | Age: 58
Setting detail: THERAPIES SERIES
Discharge: HOME | End: 2018-12-05
Payer: COMMERCIAL

## 2018-12-05 PROCEDURE — 97032 APPL MODALITY 1+ESTIM EA 15: CPT

## 2018-12-05 PROCEDURE — 97110 THERAPEUTIC EXERCISES: CPT

## 2018-12-05 PROCEDURE — 97112 NEUROMUSCULAR REEDUCATION: CPT

## 2018-12-05 NOTE — FLOWSHEET NOTE
to PLOF. Time In / Time Out:    8449/1731                Timed Code/Total Treatment Minutes:  58 15ifc 15nr 32te     Patients Report of Tolerance:    [x] Patient limited by fatigue        [] Patient limited by pain   [] Patient limited by other medical complications   [] Other:     Prognosis:   [x] Good [] Fair  [] Poor    Plan:   [] Continue per plan of care [] Alter current plan (see comments)  [x] Plan of care initiated [] Hold pending MD visit [] Discharge    Plan for Next Session:  Review HEP, core stability training, hip/low back paraspinal stretching, core stabilization on table until symptom improvement with transition to standing with pulling/pushing resistance. Estim/heat for pain.          Next Progress Note due:  Tova 11/15/18    Visit 10           Electronically signed by:   Ruben Curry PTA 12/5/18 16:29PM

## 2018-12-11 ENCOUNTER — HOSPITAL ENCOUNTER (OUTPATIENT)
Dept: PHYSICAL THERAPY | Age: 58
Discharge: HOME | End: 2018-12-11

## 2018-12-11 NOTE — FLOWSHEET NOTE
Physical Therapy  Cancellation/No-show Note  Patient Name:  Waymon Gowers  :  1960   Date:  2018  Cancelled visits to date: 1  No-shows to date: 2    For today's appointment patient:  [x]  Cancelled  []  Rescheduled appointment  []  No-show     Reason given by patient:  []  Patient ill  [x]  Conflicting appointment  []  No transportation    []  Conflict with work  []  No reason given  []  Other:     Comments:      Electronically signed by:  Nu Mondragon PT, DPT, AMEYA    2018 2:47 PM

## 2019-02-08 ENCOUNTER — TELEPHONE (OUTPATIENT)
Dept: FAMILY MEDICINE CLINIC | Age: 59
End: 2019-02-08

## 2019-03-11 ENCOUNTER — OFFICE VISIT (OUTPATIENT)
Dept: FAMILY MEDICINE CLINIC | Age: 59
End: 2019-03-11
Payer: COMMERCIAL

## 2019-03-11 VITALS
BODY MASS INDEX: 34.02 KG/M2 | SYSTOLIC BLOOD PRESSURE: 102 MMHG | DIASTOLIC BLOOD PRESSURE: 82 MMHG | WEIGHT: 272.2 LBS | OXYGEN SATURATION: 95 % | HEART RATE: 65 BPM

## 2019-03-11 DIAGNOSIS — F33.0 MAJOR DEPRESSIVE DISORDER, RECURRENT EPISODE, MILD (HCC): ICD-10-CM

## 2019-03-11 DIAGNOSIS — Z11.59 ENCOUNTER FOR HEPATITIS C SCREENING TEST FOR LOW RISK PATIENT: ICD-10-CM

## 2019-03-11 DIAGNOSIS — Z00.00 WELL ADULT EXAM: Primary | ICD-10-CM

## 2019-03-11 DIAGNOSIS — Z13.220 ENCOUNTER FOR LIPID SCREENING FOR CARDIOVASCULAR DISEASE: ICD-10-CM

## 2019-03-11 DIAGNOSIS — Z13.1 SCREENING FOR DIABETES MELLITUS (DM): ICD-10-CM

## 2019-03-11 DIAGNOSIS — Z11.4 SCREENING FOR HIV WITHOUT PRESENCE OF RISK FACTORS: ICD-10-CM

## 2019-03-11 DIAGNOSIS — Z13.6 ENCOUNTER FOR LIPID SCREENING FOR CARDIOVASCULAR DISEASE: ICD-10-CM

## 2019-03-11 PROCEDURE — 99396 PREV VISIT EST AGE 40-64: CPT | Performed by: FAMILY MEDICINE

## 2019-03-11 PROCEDURE — G8482 FLU IMMUNIZE ORDER/ADMIN: HCPCS | Performed by: FAMILY MEDICINE

## 2019-03-11 RX ORDER — CITALOPRAM 20 MG/1
20 TABLET ORAL DAILY
Qty: 90 TABLET | Refills: 3 | Status: SHIPPED | OUTPATIENT
Start: 2019-03-11 | End: 2020-03-05 | Stop reason: SDUPTHER

## 2019-03-26 ENCOUNTER — NURSE ONLY (OUTPATIENT)
Dept: FAMILY MEDICINE CLINIC | Age: 59
End: 2019-03-26
Payer: COMMERCIAL

## 2019-03-26 DIAGNOSIS — Z11.4 SCREENING FOR HIV (HUMAN IMMUNODEFICIENCY VIRUS): ICD-10-CM

## 2019-03-26 DIAGNOSIS — R73.01 IMPAIRED FASTING GLUCOSE: ICD-10-CM

## 2019-03-26 DIAGNOSIS — Z11.59 ENCOUNTER FOR HEPATITIS C SCREENING TEST FOR LOW RISK PATIENT: ICD-10-CM

## 2019-03-26 DIAGNOSIS — Z11.4 SCREENING FOR HIV WITHOUT PRESENCE OF RISK FACTORS: ICD-10-CM

## 2019-03-26 DIAGNOSIS — R17 TOTAL BILIRUBIN, ELEVATED: ICD-10-CM

## 2019-03-26 DIAGNOSIS — Z13.220 ENCOUNTER FOR LIPID SCREENING FOR CARDIOVASCULAR DISEASE: ICD-10-CM

## 2019-03-26 DIAGNOSIS — K76.0 HEPATIC STEATOSIS: ICD-10-CM

## 2019-03-26 DIAGNOSIS — Z13.6 ENCOUNTER FOR LIPID SCREENING FOR CARDIOVASCULAR DISEASE: ICD-10-CM

## 2019-03-26 DIAGNOSIS — Z13.1 SCREENING FOR DIABETES MELLITUS (DM): ICD-10-CM

## 2019-03-26 LAB
CHOLESTEROL, TOTAL: 172 MG/DL (ref 0–199)
GLUCOSE BLD-MCNC: 106 MG/DL (ref 70–99)
HDLC SERPL-MCNC: 49 MG/DL (ref 40–60)
LDL CHOLESTEROL CALCULATED: 103 MG/DL
TRIGL SERPL-MCNC: 99 MG/DL (ref 0–150)
VLDLC SERPL CALC-MCNC: 20 MG/DL

## 2019-03-26 PROCEDURE — 36415 COLL VENOUS BLD VENIPUNCTURE: CPT | Performed by: FAMILY MEDICINE

## 2019-03-28 LAB
HIV AG/AB: NORMAL
HIV ANTIGEN: NORMAL
HIV-1 ANTIBODY: NORMAL
HIV-2 AB: NORMAL

## 2020-03-05 ENCOUNTER — OFFICE VISIT (OUTPATIENT)
Dept: FAMILY MEDICINE CLINIC | Age: 60
End: 2020-03-05
Payer: COMMERCIAL

## 2020-03-05 VITALS
DIASTOLIC BLOOD PRESSURE: 72 MMHG | RESPIRATION RATE: 16 BRPM | WEIGHT: 274.8 LBS | BODY MASS INDEX: 34.17 KG/M2 | HEIGHT: 75 IN | OXYGEN SATURATION: 94 % | HEART RATE: 88 BPM | SYSTOLIC BLOOD PRESSURE: 124 MMHG

## 2020-03-05 PROCEDURE — 99386 PREV VISIT NEW AGE 40-64: CPT | Performed by: NURSE PRACTITIONER

## 2020-03-05 RX ORDER — CITALOPRAM 20 MG/1
20 TABLET ORAL DAILY
Qty: 90 TABLET | Refills: 3 | Status: SHIPPED | OUTPATIENT
Start: 2020-03-05 | End: 2021-04-09

## 2020-03-05 RX ORDER — TIZANIDINE 2 MG/1
2 TABLET ORAL NIGHTLY PRN
Qty: 10 TABLET | Refills: 0 | Status: SHIPPED | OUTPATIENT
Start: 2020-03-05 | End: 2020-06-02 | Stop reason: ALTCHOICE

## 2020-03-05 ASSESSMENT — PATIENT HEALTH QUESTIONNAIRE - PHQ9
SUM OF ALL RESPONSES TO PHQ9 QUESTIONS 1 & 2: 0
SUM OF ALL RESPONSES TO PHQ QUESTIONS 1-9: 0
1. LITTLE INTEREST OR PLEASURE IN DOING THINGS: 0
SUM OF ALL RESPONSES TO PHQ QUESTIONS 1-9: 0
2. FEELING DOWN, DEPRESSED OR HOPELESS: 0

## 2020-03-05 ASSESSMENT — ENCOUNTER SYMPTOMS
BACK PAIN: 1
VOMITING: 0
DIARRHEA: 0
COLOR CHANGE: 0
COUGH: 0
CONSTIPATION: 0
SHORTNESS OF BREATH: 0
NAUSEA: 0
ABDOMINAL PAIN: 0

## 2020-03-05 ASSESSMENT — LIFESTYLE VARIABLES
HOW OFTEN DURING THE LAST YEAR HAVE YOU NEEDED AN ALCOHOLIC DRINK FIRST THING IN THE MORNING TO GET YOURSELF GOING AFTER A NIGHT OF HEAVY DRINKING: 0
HOW OFTEN DURING THE LAST YEAR HAVE YOU BEEN UNABLE TO REMEMBER WHAT HAPPENED THE NIGHT BEFORE BECAUSE YOU HAD BEEN DRINKING: 0
AUDIT-C TOTAL SCORE: 5
AUDIT TOTAL SCORE: 5
HOW MANY STANDARD DRINKS CONTAINING ALCOHOL DO YOU HAVE ON A TYPICAL DAY: 1
HAS A RELATIVE, FRIEND, DOCTOR, OR ANOTHER HEALTH PROFESSIONAL EXPRESSED CONCERN ABOUT YOUR DRINKING OR SUGGESTED YOU CUT DOWN: 0
HOW OFTEN DO YOU HAVE SIX OR MORE DRINKS ON ONE OCCASION: 0
HOW OFTEN DURING THE LAST YEAR HAVE YOU FAILED TO DO WHAT WAS NORMALLY EXPECTED FROM YOU BECAUSE OF DRINKING: 0
HOW OFTEN DURING THE LAST YEAR HAVE YOU FOUND THAT YOU WERE NOT ABLE TO STOP DRINKING ONCE YOU HAD STARTED: 0
HOW OFTEN DURING THE LAST YEAR HAVE YOU HAD A FEELING OF GUILT OR REMORSE AFTER DRINKING: 0
HOW OFTEN DO YOU HAVE A DRINK CONTAINING ALCOHOL: 4
HAVE YOU OR SOMEONE ELSE BEEN INJURED AS A RESULT OF YOUR DRINKING: 0

## 2020-03-05 NOTE — PROGRESS NOTES
3/5/2020    Kateryna Edgar (:  1960) is a 61 y.o. male, here for a preventive medicine evaluation. Denies acute problems. Needs Celexa refill. Complaint of \"Left mid back right off the spine\", intermittent, with activity or long term sitting. Radiates to left hip. No sciatica. Tried stretching, tylenol ibuprofen. Helps. gradually getting better. Stiffness, no limited ROM. Denies numbness tingling change in sensation. Reports it started after he helped someone move back in January, states it is getting better. Would like to try conservative management. This has happened before with strenuous activity. Home schooling his boys, twins boys 7 yo. Lives at home with wife and kids. STEVEN- CPAP- wears every night. Does not eat healthy or exercise. Depression- on Celexa for multiple years, reports is depression is controlled. Negative depression screening. Patient Active Problem List   Diagnosis    Obesity, Class II, BMI 35-39.9, with comorbidity (HCC)    Chronic right shoulder pain    STEVEN (obstructive sleep apnea)    Total bilirubin, elevated    Fatigue    Umbilical hernia without obstruction and without gangrene    Major depressive disorder, recurrent episode, mild (HCC)    Erectile dysfunction    Colon polyps    RAD (reactive airway disease)    Impaired fasting glucose    Hepatic steatosis- seen on CT abdomen    Diverticulosis of large intestine- seen on CT abdomen       Review of Systems   Constitutional: Negative for activity change, appetite change and unexpected weight change. HENT: Negative. Respiratory: Negative for cough and shortness of breath. Cardiovascular: Negative for chest pain, palpitations and leg swelling. Gastrointestinal: Negative for abdominal pain, constipation, diarrhea, nausea and vomiting. Endocrine: Negative. Genitourinary: Negative. Musculoskeletal: Positive for back pain.  Negative for gait problem, joint swelling, myalgias, neck

## 2020-05-20 LAB
ALBUMIN SERPL-MCNC: 4.5 G/DL
ALP BLD-CCNC: 63 U/L
ALT SERPL-CCNC: 68 U/L
ANION GAP SERPL CALCULATED.3IONS-SCNC: ABNORMAL MMOL/L
AST SERPL-CCNC: 50 U/L
AVERAGE GLUCOSE: NORMAL
BILIRUB SERPL-MCNC: 1.7 MG/DL (ref 0.1–1.4)
BUN BLDV-MCNC: 19 MG/DL
CALCIUM SERPL-MCNC: 9.6 MG/DL
CHLORIDE BLD-SCNC: 102 MMOL/L
CHOLESTEROL, TOTAL: 189 MG/DL
CHOLESTEROL/HDL RATIO: NORMAL
CO2: 20 MMOL/L
CREAT SERPL-MCNC: 1.13 MG/DL
GFR CALCULATED: ABNORMAL
GLUCOSE BLD-MCNC: 106 MG/DL
HBA1C MFR BLD: 5 %
HDLC SERPL-MCNC: 50 MG/DL (ref 35–70)
LDL CHOLESTEROL CALCULATED: 100 MG/DL (ref 0–160)
POTASSIUM SERPL-SCNC: 4.5 MMOL/L
SODIUM BLD-SCNC: 138 MMOL/L
TOTAL PROTEIN: 6.7
TRIGL SERPL-MCNC: 193 MG/DL
VLDLC SERPL CALC-MCNC: 39 MG/DL

## 2020-06-02 ENCOUNTER — OFFICE VISIT (OUTPATIENT)
Dept: FAMILY MEDICINE CLINIC | Age: 60
End: 2020-06-02
Payer: COMMERCIAL

## 2020-06-02 VITALS
BODY MASS INDEX: 38.74 KG/M2 | SYSTOLIC BLOOD PRESSURE: 132 MMHG | OXYGEN SATURATION: 98 % | HEIGHT: 70 IN | HEART RATE: 61 BPM | WEIGHT: 270.6 LBS | DIASTOLIC BLOOD PRESSURE: 78 MMHG

## 2020-06-02 PROCEDURE — 99212 OFFICE O/P EST SF 10 MIN: CPT | Performed by: NURSE PRACTITIONER

## 2020-06-02 ASSESSMENT — ENCOUNTER SYMPTOMS
NAUSEA: 0
VOMITING: 0
ABDOMINAL DISTENTION: 0
DIARRHEA: 0
ABDOMINAL PAIN: 0
BLOOD IN STOOL: 0
CONSTIPATION: 0

## 2020-06-02 NOTE — PROGRESS NOTES
present. Mental Status: He is alert and oriented to person, place, and time. Mental status is at baseline. Psychiatric:         Mood and Affect: Mood normal.         Behavior: Behavior normal.         Thought Content: Thought content normal.         Judgment: Judgment normal.         Assessment:      Constipation and history of umbilical hernia:  I do think this was constipation from overeating. He did have a bowel movement and is doing fine now. All symptoms have resolved. Hernia seems to be stable. We discussed exercise like walking for 15 to 20 minutes after meals. Increase water intake. He can take MiraLAX over-the-counter if he becomes constipated. He was also asking about his lab work that he had done a couple weeks ago. It has not resulted in the system. He had a lab work done at [facility].  We will obtain results from lab core and I will review those results with him. We discussed his fatty liver and that he might need a GI consult.         HENRY Tinsley NP

## 2020-06-04 ENCOUNTER — TELEPHONE (OUTPATIENT)
Dept: GASTROENTEROLOGY | Age: 60
End: 2020-06-04

## 2020-06-12 LAB
CONTROL: NORMAL
HEMOCCULT STL QL: NEGATIVE

## 2021-02-04 ENCOUNTER — TELEPHONE (OUTPATIENT)
Dept: FAMILY MEDICINE CLINIC | Age: 61
End: 2021-02-04

## 2021-02-04 DIAGNOSIS — G47.30 SLEEP APNEA, UNSPECIFIED TYPE: Primary | ICD-10-CM

## 2021-02-04 NOTE — TELEPHONE ENCOUNTER
Patient's wife, Willa Varma, request referral in her MyChart account for this patient to see pulmonology for evaluation of sleep apnea.   Dr. Mica Briseno is unavailable this week so I will generate this referral for him and advise his wife it is done but to communicate in his my chart not hers

## 2021-02-18 ENCOUNTER — HOSPITAL ENCOUNTER (OUTPATIENT)
Dept: SLEEP CENTER | Age: 61
Discharge: HOME | End: 2021-02-18
Payer: COMMERCIAL

## 2021-02-18 VITALS — WEIGHT: 270 LBS | BODY MASS INDEX: 37.8 KG/M2 | HEIGHT: 71 IN

## 2021-02-18 DIAGNOSIS — G47.10 HYPERSOMNIA: ICD-10-CM

## 2021-02-18 DIAGNOSIS — G47.33 OSA (OBSTRUCTIVE SLEEP APNEA): ICD-10-CM

## 2021-02-18 DIAGNOSIS — Z01.818 PREOP TESTING: Primary | ICD-10-CM

## 2021-02-18 PROCEDURE — 99423 OL DIG E/M SVC 21+ MIN: CPT | Performed by: INTERNAL MEDICINE

## 2021-02-18 PROCEDURE — 99211 OFF/OP EST MAY X REQ PHY/QHP: CPT

## 2021-02-18 RX ORDER — IBUPROFEN 400 MG/1
400 TABLET ORAL EVERY 6 HOURS PRN
COMMUNITY
End: 2021-09-16 | Stop reason: DRUGHIGH

## 2021-02-18 ASSESSMENT — SLEEP AND FATIGUE QUESTIONNAIRES
HOW LIKELY ARE YOU TO NOD OFF OR FALL ASLEEP WHEN YOU ARE A PASSENGER IN A CAR FOR AN HOUR WITHOUT A BREAK: 1
HOW LIKELY ARE YOU TO NOD OFF OR FALL ASLEEP WHILE SITTING AND READING: 1
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
## 2021-02-18 NOTE — ASSESSMENT & PLAN NOTE
Since he has gained about 30 lbs and he has EDS  Advised to go for the CPAP retitration study  Loose weight

## 2021-03-03 ENCOUNTER — HOSPITAL ENCOUNTER (OUTPATIENT)
Age: 61
Discharge: HOME | End: 2021-03-03
Payer: COMMERCIAL

## 2021-03-03 LAB
SARS-COV-2: NOT DETECTED
SOURCE: NORMAL

## 2021-03-08 ENCOUNTER — HOSPITAL ENCOUNTER (OUTPATIENT)
Dept: SLEEP CENTER | Age: 61
Discharge: HOME | End: 2021-03-08
Payer: COMMERCIAL

## 2021-03-08 DIAGNOSIS — G47.33 OSA (OBSTRUCTIVE SLEEP APNEA): ICD-10-CM

## 2021-03-08 PROCEDURE — 95811 POLYSOM 6/>YRS CPAP 4/> PARM: CPT | Performed by: INTERNAL MEDICINE

## 2021-03-08 PROCEDURE — 95811 POLYSOM 6/>YRS CPAP 4/> PARM: CPT

## 2021-03-09 ENCOUNTER — VIRTUAL VISIT (OUTPATIENT)
Dept: FAMILY MEDICINE CLINIC | Age: 61
End: 2021-03-09
Payer: COMMERCIAL

## 2021-03-09 VITALS — BODY MASS INDEX: 37.8 KG/M2 | HEIGHT: 71 IN | WEIGHT: 270 LBS

## 2021-03-09 DIAGNOSIS — Z00.00 WELL ADULT EXAM: Primary | ICD-10-CM

## 2021-03-09 DIAGNOSIS — Z13.1 SCREENING FOR DIABETES MELLITUS: ICD-10-CM

## 2021-03-09 DIAGNOSIS — Z13.6 ENCOUNTER FOR LIPID SCREENING FOR CARDIOVASCULAR DISEASE: ICD-10-CM

## 2021-03-09 DIAGNOSIS — Z12.11 SCREEN FOR COLON CANCER: ICD-10-CM

## 2021-03-09 DIAGNOSIS — Z12.83 SKIN CANCER SCREENING: ICD-10-CM

## 2021-03-09 DIAGNOSIS — Z11.59 ENCOUNTER FOR HEPATITIS C SCREENING TEST FOR LOW RISK PATIENT: ICD-10-CM

## 2021-03-09 DIAGNOSIS — Z13.220 ENCOUNTER FOR LIPID SCREENING FOR CARDIOVASCULAR DISEASE: ICD-10-CM

## 2021-03-09 DIAGNOSIS — L82.1 SEBORRHEIC KERATOSIS: ICD-10-CM

## 2021-03-09 DIAGNOSIS — Z86.010 HISTORY OF COLON POLYPS: ICD-10-CM

## 2021-03-09 PROCEDURE — 99396 PREV VISIT EST AGE 40-64: CPT | Performed by: FAMILY MEDICINE

## 2021-03-09 ASSESSMENT — SLEEP AND FATIGUE QUESTIONNAIRES
ESS TOTAL SCORE: 9
HOW LIKELY ARE YOU TO NOD OFF OR FALL ASLEEP WHILE SITTING AND READING: 1
HOW LIKELY ARE YOU TO NOD OFF OR FALL ASLEEP WHILE SITTING QUIETLY AFTER LUNCH WITHOUT ALCOHOL: 1
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
## 2021-03-09 NOTE — PROGRESS NOTES
3/9/2021  sleep study  for Stephen Quintin  1960 is complete. Results are pending physician review.     Electronically signed by Carol Garcia on 3/9/2021 at 1:17 AM

## 2021-03-10 LAB — STATUS: NORMAL

## 2021-03-15 ENCOUNTER — TELEPHONE (OUTPATIENT)
Dept: FAMILY MEDICINE CLINIC | Age: 61
End: 2021-03-15

## 2021-04-09 DIAGNOSIS — F33.0 MAJOR DEPRESSIVE DISORDER, RECURRENT EPISODE, MILD (HCC): ICD-10-CM

## 2021-04-09 RX ORDER — CITALOPRAM 20 MG/1
20 TABLET ORAL DAILY
Qty: 90 TABLET | Refills: 3 | Status: SHIPPED | OUTPATIENT
Start: 2021-04-09 | End: 2021-09-16 | Stop reason: DRUGHIGH

## 2021-04-17 ENCOUNTER — PATIENT MESSAGE (OUTPATIENT)
Dept: FAMILY MEDICINE CLINIC | Age: 61
End: 2021-04-17

## 2021-04-17 DIAGNOSIS — M25.561 CHRONIC PAIN OF BOTH KNEES: Primary | ICD-10-CM

## 2021-04-17 DIAGNOSIS — M25.562 CHRONIC PAIN OF BOTH KNEES: Primary | ICD-10-CM

## 2021-04-17 DIAGNOSIS — G89.29 CHRONIC PAIN OF BOTH KNEES: Primary | ICD-10-CM

## 2021-04-28 NOTE — TELEPHONE ENCOUNTER
Await ke response to ensure that [facility] kobi Henry/Ginny are included in his insurance plan

## 2021-05-05 ENCOUNTER — HOSPITAL ENCOUNTER (OUTPATIENT)
Dept: SLEEP CENTER | Age: 61
Discharge: HOME | End: 2021-05-05
Payer: COMMERCIAL

## 2021-05-05 DIAGNOSIS — G47.33 OSA (OBSTRUCTIVE SLEEP APNEA): ICD-10-CM

## 2021-05-05 DIAGNOSIS — G47.10 HYPERSOMNIA: ICD-10-CM

## 2021-05-05 PROCEDURE — 9990000010 HC NO CHARGE VISIT

## 2021-05-05 PROCEDURE — 99214 OFFICE O/P EST MOD 30 MIN: CPT | Performed by: INTERNAL MEDICINE

## 2021-05-05 ASSESSMENT — SLEEP AND FATIGUE QUESTIONNAIRES
HOW LIKELY ARE YOU TO NOD OFF OR FALL ASLEEP WHILE SITTING AND TALKING TO SOMEONE: 0
HOW LIKELY ARE YOU TO NOD OFF OR FALL ASLEEP WHILE SITTING QUIETLY AFTER LUNCH WITHOUT ALCOHOL: 1
HOW LIKELY ARE YOU TO NOD OFF OR FALL ASLEEP WHILE SITTING AND READING: 1
ESS TOTAL SCORE: 6
HOW LIKELY ARE YOU TO NOD OFF OR FALL ASLEEP WHILE WATCHING TV: 1
HOW LIKELY ARE YOU TO NOD OFF OR FALL ASLEEP WHEN YOU ARE A PASSENGER IN A CAR FOR AN HOUR WITHOUT A BREAK: 2
HOW LIKELY ARE YOU TO NOD OFF OR FALL ASLEEP IN A CAR, WHILE STOPPED FOR A FEW MINUTES IN TRAFFIC: 0

## 2021-05-05 ASSESSMENT — ENCOUNTER SYMPTOMS
BACK PAIN: 0
EYE ITCHING: 0
ABDOMINAL PAIN: 0
ABDOMINAL DISTENTION: 0
EYE DISCHARGE: 0
SHORTNESS OF BREATH: 0
COUGH: 0
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
## 2021-05-10 ENCOUNTER — TELEPHONE (OUTPATIENT)
Dept: PULMONOLOGY | Age: 61
End: 2021-05-10

## 2021-05-18 ENCOUNTER — HOSPITAL ENCOUNTER (OUTPATIENT)
Age: 61
Discharge: HOME | End: 2021-05-18
Payer: COMMERCIAL

## 2021-05-18 LAB
CHOLESTEROL, FASTING: 164 MG/DL
GLUCOSE FASTING: 111 MG/DL (ref 70–99)
HDLC SERPL-MCNC: 45 MG/DL
HEPATITIS C ANTIBODY: NON REACTIVE
LDL CHOLESTEROL DIRECT: 104 MG/DL
TRIGLYCERIDE, FASTING: 147 MG/DL

## 2021-05-18 PROCEDURE — 80061 LIPID PANEL: CPT

## 2021-05-18 PROCEDURE — 82947 ASSAY GLUCOSE BLOOD QUANT: CPT

## 2021-05-18 PROCEDURE — 86803 HEPATITIS C AB TEST: CPT

## 2021-05-18 PROCEDURE — 36415 COLL VENOUS BLD VENIPUNCTURE: CPT

## 2021-06-01 ENCOUNTER — OFFICE VISIT (OUTPATIENT)
Dept: ORTHOPEDIC SURGERY | Age: 61
End: 2021-06-01
Payer: COMMERCIAL

## 2021-06-01 VITALS
WEIGHT: 270 LBS | OXYGEN SATURATION: 97 % | HEART RATE: 66 BPM | BODY MASS INDEX: 37.8 KG/M2 | RESPIRATION RATE: 15 BRPM | HEIGHT: 71 IN

## 2021-06-01 DIAGNOSIS — M17.11 PRIMARY OSTEOARTHRITIS OF RIGHT KNEE: Primary | ICD-10-CM

## 2021-06-01 PROCEDURE — 20610 DRAIN/INJ JOINT/BURSA W/O US: CPT | Performed by: ORTHOPAEDIC SURGERY

## 2021-06-01 PROCEDURE — 99203 OFFICE O/P NEW LOW 30 MIN: CPT | Performed by: ORTHOPAEDIC SURGERY

## 2021-06-01 NOTE — PROGRESS NOTES
Patient presents to the office today for evaluation of the bilateral knee pain R>L. Pt states pain today in the right knee is a 3/10 left knee is a 1/10 pt states his knees are constantly stiff. Pain will increase at rest or with prolong standing, and going up and down the stairs. Pt states pain is located along the lateal aspect of the right knee. Pt denies any recent injury or accident to the bilateral knees. Pt states he if very stiff globally in the bilateral knees.  Pt denies any treatment to the knees
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
## 2021-06-01 NOTE — PATIENT INSTRUCTIONS
Continue weight-bearing as tolerated. Continue range of motion exercises as instructed. Ice and elevate as needed. Tylenol or Motrin for pain.   steroid injection given today in the right knee  Follow up in 6 weeks

## 2021-06-04 ASSESSMENT — ENCOUNTER SYMPTOMS
EYE PAIN: 0
SHORTNESS OF BREATH: 0
CHEST TIGHTNESS: 0
VOMITING: 0
COLOR CHANGE: 0
EYE REDNESS: 0
WHEEZING: 0

## 2021-07-27 ENCOUNTER — OFFICE VISIT (OUTPATIENT)
Dept: ORTHOPEDIC SURGERY | Age: 61
End: 2021-07-27
Payer: COMMERCIAL

## 2021-07-27 VITALS
RESPIRATION RATE: 16 BRPM | HEART RATE: 55 BPM | OXYGEN SATURATION: 97 % | WEIGHT: 271.8 LBS | BODY MASS INDEX: 38.05 KG/M2 | HEIGHT: 71 IN

## 2021-07-27 DIAGNOSIS — M25.561 RIGHT KNEE PAIN, UNSPECIFIED CHRONICITY: Primary | ICD-10-CM

## 2021-07-27 PROCEDURE — 99212 OFFICE O/P EST SF 10 MIN: CPT | Performed by: ORTHOPAEDIC SURGERY

## 2021-07-27 NOTE — PATIENT INSTRUCTIONS
Weightbearing and activities as tolerated  May take Tylenol and Ibuprofen as needed  Rest, ice, and elevate as needed  Work on ROM and strengthening exercises   Follow up as needed

## 2021-08-01 ASSESSMENT — ENCOUNTER SYMPTOMS
CHEST TIGHTNESS: 0
SHORTNESS OF BREATH: 0
COLOR CHANGE: 0

## 2021-08-01 NOTE — PROGRESS NOTES
Patient returns to the office for a follow up on his right knee injection that was administered on 6/1/21 with improvement to his previously reported symptoms. Pain remains along the lateral aspect of the extremity but not as severe as it was prior to his injection with pain rated at a 2/10 today. Symptoms are aggravated by crossing his legs and prolonged periods of activity. He does continue to have stiffness with swelling subsided. No new injury or worsening of symptoms reported.
films in the office today:   X-ray images of bilateral knees were again reviewed and show mild degenerative joint disease bilaterally. Office Procedures:  No orders of the defined types were placed in this encounter. Assessment and Plan  1. Bilateral right worse than left knee mild primary osteoarthritis    He has responded well to the steroid injection. I encouraged him to continue with home exercise program.  Continue with stretching and anti-inflammatory medications as needed. Follow-up as needed if symptoms return and he would like to consider repeat injection.         Electronically signed by Codi Johnson MD on 8/1/2021 at 10:40 AM

## 2021-09-16 ENCOUNTER — OFFICE VISIT (OUTPATIENT)
Dept: FAMILY MEDICINE CLINIC | Age: 61
End: 2021-09-16
Payer: COMMERCIAL

## 2021-09-16 VITALS
OXYGEN SATURATION: 96 % | HEART RATE: 65 BPM | DIASTOLIC BLOOD PRESSURE: 82 MMHG | WEIGHT: 277 LBS | SYSTOLIC BLOOD PRESSURE: 120 MMHG | HEIGHT: 71 IN | BODY MASS INDEX: 38.78 KG/M2

## 2021-09-16 DIAGNOSIS — Z86.39 HISTORY OF HYPERGLYCEMIA: ICD-10-CM

## 2021-09-16 DIAGNOSIS — Z13.0 SCREENING FOR DEFICIENCY ANEMIA: ICD-10-CM

## 2021-09-16 DIAGNOSIS — Z12.11 SCREENING FOR COLON CANCER: ICD-10-CM

## 2021-09-16 DIAGNOSIS — F32.A DEPRESSION, UNSPECIFIED DEPRESSION TYPE: Primary | ICD-10-CM

## 2021-09-16 DIAGNOSIS — E66.09 CLASS 2 OBESITY DUE TO EXCESS CALORIES WITHOUT SERIOUS COMORBIDITY WITH BODY MASS INDEX (BMI) OF 38.0 TO 38.9 IN ADULT: ICD-10-CM

## 2021-09-16 LAB
REASON FOR REJECTION: NORMAL
REJECTED TEST: NORMAL

## 2021-09-16 PROCEDURE — 99214 OFFICE O/P EST MOD 30 MIN: CPT | Performed by: NURSE PRACTITIONER

## 2021-09-16 PROCEDURE — 36415 COLL VENOUS BLD VENIPUNCTURE: CPT | Performed by: NURSE PRACTITIONER

## 2021-09-16 RX ORDER — CITALOPRAM 10 MG/1
10 TABLET ORAL DAILY
Qty: 30 TABLET | Refills: 0 | Status: SHIPPED | OUTPATIENT
Start: 2021-09-16 | End: 2021-10-18 | Stop reason: ALTCHOICE

## 2021-09-16 ASSESSMENT — ENCOUNTER SYMPTOMS
NAUSEA: 0
ABDOMINAL DISTENTION: 0
ALLERGIC/IMMUNOLOGIC NEGATIVE: 1
BACK PAIN: 0
SHORTNESS OF BREATH: 0
VOMITING: 0
DIARRHEA: 0
EYES NEGATIVE: 1
BLOOD IN STOOL: 0

## 2021-09-16 NOTE — PATIENT INSTRUCTIONS
I am committed to providing you the best care possible. If you receive a survey after visiting our office, please take time to share your experience concerning your office visit. These surveys are confidential and no health information about you is shared. I am eager to improve for you and I am counting on your Patient Education        Body Mass Index: Care Instructions  Your Care Instructions     Body mass index (BMI) can help you see if your weight is raising your risk for health problems. It uses a formula to compare how much you weigh with how tall you are. · A BMI lower than 18.5 is considered underweight. · A BMI between 18.5 and 24.9 is considered healthy. · A BMI between 25 and 29.9 is considered overweight. A BMI of 30 or higher is considered obese. If your BMI is in the normal range, it means that you have a lower risk for weight-related health problems. If your BMI is in the overweight or obese range, you may be at increased risk for weight-related health problems, such as high blood pressure, heart disease, stroke, arthritis or joint pain, and diabetes. If your BMI is in the underweight range, you may be at increased risk for health problems such as fatigue, lower protection (immunity) against illness, muscle loss, bone loss, hair loss, and hormone problems. BMI is just one measure of your risk for weight-related health problems. You may be at higher risk for health problems if you are not active, you eat an unhealthy diet, or you drink too much alcohol or use tobacco products. Follow-up care is a key part of your treatment and safety. Be sure to make and go to all appointments, and call your doctor if you are having problems. It's also a good idea to know your test results and keep a list of the medicines you take. How can you care for yourself at home? · Practice healthy eating habits. This includes eating plenty of fruits, vegetables, whole grains, lean protein, and low-fat dairy.   · If your doctor recommends it, get more exercise. Walking is a good choice. Bit by bit, increase the amount you walk every day. Try for at least 30 minutes on most days of the week. · Do not smoke. Smoking can increase your risk for health problems. If you need help quitting, talk to your doctor about stop-smoking programs and medicines. These can increase your chances of quitting for good. · Limit alcohol to 2 drinks a day for men and 1 drink a day for women. Too much alcohol can cause health problems. If you have a BMI higher than 25  · Your doctor may do other tests to check your risk for weight-related health problems. This may include measuring the distance around your waist. A waist measurement of more than 40 inches in men or 35 inches in women can increase the risk of weight-related health problems. · Talk with your doctor about steps you can take to stay healthy or improve your health. You may need to make lifestyle changes to lose weight and stay healthy, such as changing your diet and getting regular exercise. If you have a BMI lower than 18.5  · Your doctor may do other tests to check your risk for health problems. · Talk with your doctor about steps you can take to stay healthy or improve your health. You may need to make lifestyle changes to gain or maintain weight and stay healthy, such as getting more healthy foods in your diet and doing exercises to build muscle. Where can you learn more? Go to https://paulo.[email or web address] org and sign in to your [employer]BM[employer] account. Enter S176 in the [facility] box to learn more about \"Body Mass Index: Care Instructions. \"     If you do not have an account, please click on the \"Sign Up Now\" link. Current as of: March 17, 2021               Content Version: 12.9  © 6317-6308 [employer]. Care instructions adapted under license by [employer].  If you have questions about a medical condition or this instruction, always ask your healthcare professional. [address] any warranty or liability for your use of this information. yves to help make that happen. Take 10 mg Celexa daily for 2 weeks ( I have sent in a new prescription for you)  Then take 10 mg every other day for two weeks. After that, can discontinue medication if symptoms of depression do not come back. Patient Education        Fecal Immunochemical Test (FIT): About This Test  What is it? This test checks for hidden blood in the stool. Your kit will explain how to collect a sample or test a small amount of stool. Dulce Maria Martin return the kit to the doctor or to a lab. Or if your kit shows results at home and is positive for blood, call your doctor right away. Why is this test done? This test is done to check for colorectal cancer. How is the test done? There are different types of home tests available. It's important to follow the instructions provided with any test.  Don't do the test during your menstrual period or if you are having bleeding from hemorrhoids. Here are some general instructions:  · Check the expiration date on the package. Don't use a test kit after its expiration date. · Follow the instructions exactly. Do all the steps in order, without skipping any of them. · After you finish your test, follow the instructions that you were given for returning the test.  For a FIT test, you put a small sample of stool in a tube or on a card that comes with the kit. You return this to your doctor or a lab. There is a FIT test that shows the results right away. If your test shows that blood was found in your stool sample, call your doctor as soon as you can. Follow-up care is a key part of your treatment and safety. Be sure to make and go to all appointments, and call your doctor if you are having problems. It's also a good idea to keep a list of the medicines you take.  Ask your doctor when you can expect to have your test results. Where can you learn more? Go to https://chpepiceweb.[email or web address] org and sign in to your [employer] account. Enter L936 in the [facility] box to learn more about \"Fecal Immunochemical Test (FIT): About This Test.\"     If you do not have an account, please click on the \"Sign Up Now\" link. Current as of: December 17, 2020               Content Version: 12.9  © 2006-2021 Healthwise, [employer]. Care instructions adapted under license by [facility] ([facility]). If you have questions about a medical condition or this instruction, always ask your healthcare professional. [address] any warranty or liability for your use of this information. lease call office for any new or worsening symptoms or concerns. Please return your FIT test, and recommendation is to get a colonoscopy as soon as you are able to due to your history of polyps in the past.    We will notify you of your lab results by phone. Patient Education        Depression and Chronic Disease: Care Instructions  Your Care Instructions     A chronic disease is one that you have for a long time. Some chronic diseases can be controlled, but they usually cannot be cured. Depression is common in people with chronic diseases, but it often goes unnoticed. Many people have concerns about seeking treatment for a mental health problem. You may think it's a sign of weakness, or you don't want people to know about it. It's important to overcome these reasons for not seeking treatment. Treating depression or anxiety is good for your health. Follow-up care is a key part of your treatment and safety. Be sure to make and go to all appointments, and call your doctor if you are having problems. It's also a good idea to know your test results and keep a list of the medicines you take. How can you care for yourself at home?   Watch for symptoms of depression  The symptoms of depression are often subtle at first. You may think they are caused by your disease rather than depression. Or you may think it is normal to be depressed when you have a chronic disease. If you are depressed you may:  · Feel sad or hopeless. · Feel guilty or worthless. · Not enjoy the things you used to enjoy. · Feel hopeless, as though life is not worth living. · Have trouble thinking or remembering. · Have low energy, and you may not eat or sleep well. · Pull away from others. · Think often about death or killing yourself. (Keep the numbers for these national suicide hotlines: 8-398-715-TALK [1-588.150.3002] and 0-061-JHPKXNN [1-177.359.1168]. )  Get treatment  By treating your depression, you can feel more hopeful and have more energy. If you feel better, you may take better care of yourself, so your health may improve. · Talk to your doctor if you have any changes in mood during treatment for your disease. · Ask your doctor for help. Counseling, antidepressant medicine, or a combination of the two can help most people with depression. Often a combination works best. Counseling can also help you cope with having a chronic disease. When should you call for help? Call 911 anytime you think you may need emergency care. For example, call if:    · You feel like hurting yourself or someone else.     · Someone you know has depression and is about to attempt or is attempting suicide. Call your doctor now or seek immediate medical care if:    · You hear voices.     · Someone you know has depression and:  ? Starts to give away his or her possessions. ? Uses illegal drugs or drinks alcohol heavily. ? Talks or writes about death, including writing suicide notes or talking about guns, knives, or pills. ? Starts to spend a lot of time alone. ? Acts very aggressively or suddenly appears calm. Watch closely for changes in your health, and be sure to contact your doctor if:    · You do not get better as expected. Where can you learn more?   Go to [email or web address].[email or web address] and sign in to your [employer] account. Enter R718 in the [facility] box to learn more about \"Depression and Chronic Disease: Care Instructions. \"     If you do not have an account, please click on the \"Sign Up Now\" link. Current as of: September 23, 2020               Content Version: 12.9  © 6907-8979 [employer], [facility]. Care instructions adapted under license by [facility] ([facility]). If you have questions about a medical condition or this instruction, always ask your healthcare professional. [address] any warranty or liability for your use of this information.

## 2021-09-16 NOTE — PROGRESS NOTES
 Stroke Paternal Grandmother     Allergies Daughter     Asthma Daughter     Depression Daughter     Cancer Paternal Grandfather         leukemia     Past Surgical History:   Procedure Laterality Date    COLONOSCOPY  2011    Dr. Rodriguez [place] COLONOSCOPY  09/05/2018    Dr. Jackson Alvarez 2011    Dr. Natasha Victoria  2010    for STEVEN [address]  04/18/2018        Review of Systems   Constitutional: Negative for fatigue and unexpected weight change. HENT: Negative. Negative for congestion. Eyes: Negative. Respiratory: Negative for shortness of breath. Cardiovascular: Negative for chest pain, palpitations and leg swelling. Gastrointestinal: Negative for abdominal distention, blood in stool, diarrhea, nausea and vomiting. Endocrine: Negative. Genitourinary: Negative. Musculoskeletal: Positive for arthralgias. Negative for back pain and gait problem. Right knee   Skin: Negative. Allergic/Immunologic: Negative. Neurological: Negative for dizziness, weakness and headaches. Hematological: Negative. Psychiatric/Behavioral: Negative for agitation and sleep disturbance. The patient is not nervous/anxious. Reports negative symptoms of depression since retiring       OBJECTIVE:  /82 (Site: Left Upper Arm, Position: Sitting, Cuff Size: Large Adult)   Pulse 65   Ht 5' 11\" (1.803 m)   Wt 277 lb (125.6 kg)   SpO2 96%   BMI 38.63 kg/m²   BP Readings from Last 3 Encounters:   09/16/21 120/82   06/02/20 132/78   03/05/20 124/72     Wt Readings from Last 3 Encounters:   09/16/21 277 lb (125.6 kg)   07/27/21 271 lb 12.8 oz (123.3 kg)   06/01/21 270 lb (122.5 kg)     Body mass index is 38.63 kg/m². Physical Exam  Constitutional:       General: He is not in acute distress. Appearance: He is well-developed. He is obese. He is not ill-appearing.    HENT:      Head: Normocephalic and atraumatic. Right Ear: External ear normal.      Left Ear: External ear normal.      Nose: Nose normal.   Eyes:      Conjunctiva/sclera: Conjunctivae normal.      Pupils: Pupils are equal, round, and reactive to light. Cardiovascular:      Rate and Rhythm: Normal rate and regular rhythm. Pulses: Normal pulses. Heart sounds: Normal heart sounds. Pulmonary:      Effort: Pulmonary effort is normal.      Breath sounds: Normal breath sounds. Abdominal:      General: Bowel sounds are normal. There is no distension. Palpations: Abdomen is soft. Tenderness: There is no abdominal tenderness. Musculoskeletal:         General: Normal range of motion. Cervical back: Normal range of motion and neck supple. Skin:     General: Skin is warm and dry. Capillary Refill: Capillary refill takes less than 2 seconds. Neurological:      General: No focal deficit present. Mental Status: He is alert. Deep Tendon Reflexes: Reflexes are normal and symmetric. Psychiatric:         Mood and Affect: Mood normal.         Behavior: Behavior normal.         Thought Content: Thought content normal.         Judgment: Judgment normal.         ASSESSMENT/PLAN:    1. History of hyperglycemia  5/18/21 -   - COMPREHENSIVE METABOLIC PANEL    2. Depression, unspecified depression type  Patient wants to discontinue celexa  Discontinue celexa 20 mg daily  Discussed weaning off gradually to reduce risk of recurrent symptoms  Instructed to take 10 mg daily x 2 weeks  Then 10 mg every other day for two weeks, then discontinue if symptoms have not resumed. - citalopram (CELEXA) 10 MG tablet; Take 1 tablet by mouth daily  Dispense: 30 tablet; Refill: 0    3. Screening for colon cancer  Has a history of polyps   Colonoscopy recommended, but patient states he cannot afford at this time  Advised to have done as soon as he is able to  - POCT Fecal Immunochemical Test (FIT); Future    4.  Screening for deficiency anemia  - CBC WITH AUTO DIFFERENTIAL    5. Class 2 obesity due to excess calories without serious comorbidity with BMI of 38.0-38.9 in adult  Increase routine exercise as tolerated  Verbal and written instructions given for healthy lifestyle modifications      Orders Placed This Encounter   Procedures    COMPREHENSIVE METABOLIC PANEL    CBC WITH AUTO DIFFERENTIAL    HEMOGLOBIN A1C    POCT Fecal Immunochemical Test (FIT)     Standing Status:   Future     Standing Expiration Date:   9/16/2022     Current Outpatient Medications   Medication Sig Dispense Refill    citalopram (CELEXA) 10 MG tablet Take 1 tablet by mouth daily 30 tablet 0    ZINC PO Take by mouth      acetaminophen (TYLENOL) 325 MG tablet Take 650 mg by mouth every 6 hours as needed for Pain      Misc Natural Products (GLUCOSAMINE CHOND COMPLEX/MSM PO) Take by mouth      albuterol sulfate HFA (PROVENTIL HFA) 108 (90 BASE) MCG/ACT inhaler Inhale 2 puffs into the lungs every 4 hours as needed for Wheezing Can substitute albuterol inhaler per formulary 1 Inhaler 6    fish oil-omega-3 fatty acids 1000 MG capsule Take 2 g by mouth daily. (Takes 3-4 per day)       No current facility-administered medications for this visit. Return in about 6 months (around 3/16/2022), or hyperglycemia, for depression. Nathaly Quevedo, APRN - CNP DNP, FNP-C    Return for new or worsening symptoms or any concerns as needed.

## 2021-09-17 ENCOUNTER — TELEPHONE (OUTPATIENT)
Dept: FAMILY MEDICINE CLINIC | Age: 61
End: 2021-09-17

## 2021-09-17 LAB
A/G RATIO: 2 (ref 1.1–2.2)
ALBUMIN SERPL-MCNC: 4.5 G/DL (ref 3.4–5)
ALP BLD-CCNC: 60 U/L (ref 40–129)
ALT SERPL-CCNC: 71 U/L (ref 10–40)
ANION GAP SERPL CALCULATED.3IONS-SCNC: 17 MMOL/L (ref 3–16)
AST SERPL-CCNC: 53 U/L (ref 15–37)
BILIRUB SERPL-MCNC: 1.3 MG/DL (ref 0–1)
BUN BLDV-MCNC: 12 MG/DL (ref 7–20)
CALCIUM SERPL-MCNC: 9.1 MG/DL (ref 8.3–10.6)
CHLORIDE BLD-SCNC: 102 MMOL/L (ref 99–110)
CO2: 18 MMOL/L (ref 21–32)
CREAT SERPL-MCNC: 0.8 MG/DL (ref 0.8–1.3)
GFR AFRICAN AMERICAN: >60
GFR NON-AFRICAN AMERICAN: >60
GLOBULIN: 2.2 G/DL
GLUCOSE BLD-MCNC: 121 MG/DL (ref 70–99)
POTASSIUM SERPL-SCNC: 4.6 MMOL/L (ref 3.5–5.1)
SODIUM BLD-SCNC: 137 MMOL/L (ref 136–145)
TOTAL PROTEIN: 6.7 G/DL (ref 6.4–8.2)

## 2021-09-22 ENCOUNTER — TELEPHONE (OUTPATIENT)
Dept: FAMILY MEDICINE CLINIC | Age: 61
End: 2021-09-22

## 2021-09-22 DIAGNOSIS — R53.83 FATIGUE, UNSPECIFIED TYPE: Primary | ICD-10-CM

## 2021-09-22 NOTE — TELEPHONE ENCOUNTER
Dr Eric Villela this patient saw [facility] and had labs drawn.  One was rejected and the other needs reviewed they sent result to [facility].

## 2021-09-27 ENCOUNTER — TELEPHONE (OUTPATIENT)
Dept: GASTROENTEROLOGY | Age: 61
End: 2021-09-27

## 2021-09-30 NOTE — TELEPHONE ENCOUNTER
No lab has not redrawn labs.  I can let pt know to get them done but cbc will need to be placed again as they d/c the order

## 2021-10-18 ENCOUNTER — OFFICE VISIT (OUTPATIENT)
Dept: GASTROENTEROLOGY | Age: 61
End: 2021-10-18
Payer: COMMERCIAL

## 2021-10-18 ENCOUNTER — HOSPITAL ENCOUNTER (OUTPATIENT)
Age: 61
Discharge: HOME | End: 2021-10-18
Payer: COMMERCIAL

## 2021-10-18 ENCOUNTER — TELEPHONE (OUTPATIENT)
Dept: GASTROENTEROLOGY | Age: 61
End: 2021-10-18

## 2021-10-18 VITALS
DIASTOLIC BLOOD PRESSURE: 88 MMHG | BODY MASS INDEX: 39.42 KG/M2 | SYSTOLIC BLOOD PRESSURE: 126 MMHG | OXYGEN SATURATION: 94 % | WEIGHT: 281.6 LBS | TEMPERATURE: 97.7 F | HEIGHT: 71 IN | HEART RATE: 60 BPM

## 2021-10-18 DIAGNOSIS — K76.0 FATTY LIVER: ICD-10-CM

## 2021-10-18 DIAGNOSIS — Z01.818 PREOP TESTING: ICD-10-CM

## 2021-10-18 DIAGNOSIS — R74.8 ELEVATED LIVER ENZYMES: ICD-10-CM

## 2021-10-18 DIAGNOSIS — Z86.010 HISTORY OF COLON POLYPS: ICD-10-CM

## 2021-10-18 DIAGNOSIS — K76.0 FATTY LIVER: Primary | ICD-10-CM

## 2021-10-18 LAB
ALBUMIN SERPL-MCNC: 4.5 GM/DL (ref 3.4–5)
ALP BLD-CCNC: 58 IU/L (ref 40–129)
ALT SERPL-CCNC: 68 U/L (ref 10–40)
AST SERPL-CCNC: 45 IU/L (ref 15–37)
BASOPHILS ABSOLUTE: 0 K/CU MM
BASOPHILS RELATIVE PERCENT: 0.4 % (ref 0–1)
BILIRUB SERPL-MCNC: 1.5 MG/DL (ref 0–1)
BILIRUBIN DIRECT: 0.3 MG/DL (ref 0–0.3)
BILIRUBIN, INDIRECT: 1.2 MG/DL (ref 0–0.7)
DIFFERENTIAL TYPE: ABNORMAL
EOSINOPHILS ABSOLUTE: 0.1 K/CU MM
EOSINOPHILS RELATIVE PERCENT: 3 % (ref 0–3)
FERRITIN: 1108 NG/ML (ref 30–400)
GAMMA GLUTAMYL TRANSFERASE: 36 IU/L (ref 8–61)
HAV IGM SER IA-ACNC: NON REACTIVE
HBV SURFACE AB TITR SER: 9.24 {TITER}
HCT VFR BLD CALC: 43.2 % (ref 42–52)
HEMOGLOBIN: 14.3 GM/DL (ref 13.5–18)
HEPATITIS B CORE IGM ANTIBODY: NON REACTIVE
HEPATITIS B SURFACE ANTIGEN: NON REACTIVE
HEPATITIS C ANTIBODY: NON REACTIVE
IMMATURE NEUTROPHIL %: 0.2 % (ref 0–0.43)
INR BLD: 0.95 INDEX
IRON: 159 UG/DL (ref 59–158)
LYMPHOCYTES ABSOLUTE: 1.3 K/CU MM
LYMPHOCYTES RELATIVE PERCENT: 27 % (ref 24–44)
MCH RBC QN AUTO: 34.1 PG (ref 27–31)
MCHC RBC AUTO-ENTMCNC: 33.1 % (ref 32–36)
MCV RBC AUTO: 103.1 FL (ref 78–100)
MONOCYTES ABSOLUTE: 0.3 K/CU MM
MONOCYTES RELATIVE PERCENT: 6.4 % (ref 0–4)
NUCLEATED RBC %: 0 %
PCT TRANSFERRIN: 56 % (ref 10–44)
PDW BLD-RTO: 12.6 % (ref 11.7–14.9)
PLATELET # BLD: 110 K/CU MM (ref 140–440)
PMV BLD AUTO: 11.1 FL (ref 7.5–11.1)
PROTHROMBIN TIME: 12.2 SECONDS (ref 11.7–14.5)
RBC # BLD: 4.19 M/CU MM (ref 4.6–6.2)
SEGMENTED NEUTROPHILS ABSOLUTE COUNT: 3 K/CU MM
SEGMENTED NEUTROPHILS RELATIVE PERCENT: 63 % (ref 36–66)
TOTAL IMMATURE NEUTOROPHIL: 0.01 K/CU MM
TOTAL IRON BINDING CAPACITY: 286 UG/DL (ref 250–450)
TOTAL NUCLEATED RBC: 0 K/CU MM
TOTAL PROTEIN: 6.4 GM/DL (ref 6.4–8.2)
UNSATURATED IRON BINDING CAPACITY: 127 UG/DL (ref 110–370)
WBC # BLD: 4.7 K/CU MM (ref 4–10.5)

## 2021-10-18 PROCEDURE — 83540 ASSAY OF IRON: CPT

## 2021-10-18 PROCEDURE — U0003 INFECTIOUS AGENT DETECTION BY NUCLEIC ACID (DNA OR RNA); SEVERE ACUTE RESPIRATORY SYNDROME CORONAVIRUS 2 (SARS-COV-2) (CORONAVIRUS DISEASE [COVID-19]), AMPLIFIED PROBE TECHNIQUE, MAKING USE OF HIGH THROUGHPUT TECHNOLOGIES AS DESCRIBED BY CMS-2020-01-R: HCPCS

## 2021-10-18 PROCEDURE — 99203 OFFICE O/P NEW LOW 30 MIN: CPT | Performed by: NURSE PRACTITIONER

## 2021-10-18 PROCEDURE — 86694 HERPES SIMPLEX NES ANTBDY: CPT

## 2021-10-18 PROCEDURE — 80074 ACUTE HEPATITIS PANEL: CPT

## 2021-10-18 PROCEDURE — 86708 HEPATITIS A ANTIBODY: CPT

## 2021-10-18 PROCEDURE — 86704 HEP B CORE ANTIBODY TOTAL: CPT

## 2021-10-18 PROCEDURE — 85025 COMPLETE CBC W/AUTO DIFF WBC: CPT

## 2021-10-18 PROCEDURE — 86706 HEP B SURFACE ANTIBODY: CPT

## 2021-10-18 PROCEDURE — 82390 ASSAY OF CERULOPLASMIN: CPT

## 2021-10-18 PROCEDURE — 86705 HEP B CORE ANTIBODY IGM: CPT

## 2021-10-18 PROCEDURE — 36415 COLL VENOUS BLD VENIPUNCTURE: CPT

## 2021-10-18 PROCEDURE — 82977 ASSAY OF GGT: CPT

## 2021-10-18 PROCEDURE — 86038 ANTINUCLEAR ANTIBODIES: CPT

## 2021-10-18 PROCEDURE — U0005 INFEC AGEN DETEC AMPLI PROBE: HCPCS

## 2021-10-18 PROCEDURE — 82104 ALPHA-1-ANTITRYPSIN PHENO: CPT

## 2021-10-18 PROCEDURE — 86803 HEPATITIS C AB TEST: CPT

## 2021-10-18 PROCEDURE — 82728 ASSAY OF FERRITIN: CPT

## 2021-10-18 PROCEDURE — 80076 HEPATIC FUNCTION PANEL: CPT

## 2021-10-18 PROCEDURE — 86709 HEPATITIS A IGM ANTIBODY: CPT

## 2021-10-18 PROCEDURE — 83550 IRON BINDING TEST: CPT

## 2021-10-18 PROCEDURE — 85610 PROTHROMBIN TIME: CPT

## 2021-10-18 PROCEDURE — 83516 IMMUNOASSAY NONANTIBODY: CPT

## 2021-10-18 RX ORDER — AMPICILLIN TRIHYDRATE 250 MG
CAPSULE ORAL DAILY
COMMUNITY

## 2021-10-18 ASSESSMENT — ENCOUNTER SYMPTOMS
EYE DISCHARGE: 0
NAUSEA: 0
VOMITING: 0
ABDOMINAL PAIN: 0
EYE PAIN: 0
COLOR CHANGE: 0
BLOOD IN STOOL: 0
COUGH: 1
WHEEZING: 0
SHORTNESS OF BREATH: 0
BACK PAIN: 0
CONSTIPATION: 0
DIARRHEA: 0

## 2021-10-18 NOTE — PROGRESS NOTES
Thanks so much for taking care of him, Candelaria Robertson!      Appreciate you and your team,  Steven Paula

## 2021-10-18 NOTE — PROGRESS NOTES
Maicol Devi 64 y.o. male was seen by ARIS Jacob on 10/18/21     Wt Readings from Last 3 Encounters:   10/18/21 281 lb 9.6 oz (127.7 kg)   09/16/21 277 lb (125.6 kg)   07/27/21 271 lb 12.8 oz (123.3 kg)       NORMA Devi is a pleasant 64 y.o.  male who presents today for elevated liver enzymes and fatty liver. He has a past medical history of bruised ribs, chronic shoulder pain, contusion of ribs, depression, heel pain, bilateral, lipoma of arm, obesity (BMI 30-39.9), STEVEN (obstructive sleep apnea), RAD (reactive airway disease), rib pain, right shoulder injury, total bilirubin, elevated, tubular adenoma of colon, and umbilical hernia without obstruction and without gangrene. He mentioned cutting back on beer intake; a thirty pack lasted him one month (drinks one to three beers three times a week). No family history of liver disease. His last colonoscopy was done by Dr. Javier Peng on 9-5-2018 with no colon polyps removed. His prior colonoscopy in 2014 showed two sessile polyps that were removed. His FIT test this year was negative. His CT of abdomen/pelvis done on 2- showed no acute intra-abdominal abnormality, moderate fat-containing umbilical hernia, mild diverticulosis and hepatic steatosis. His lab work done on 9- showed Total Bilirubin 1.3, Alkaline Phosphatase 60, ALT 71, AST 53. His appetite is good without early satiety. His weight is stable. He is trying to exercise more and eat healthier for weight loss. No nausea or vomiting. No abdominal pain, bloating or distention. No heartburn or acid reflux. No dysphagia or pain with swallowing. No excess belching or flatulence. No changes in his bowel pattern. His typical bowel pattern is twice daily with soft brown formed stools. No diarrhea or constipation. No blood in his stools or black tarry stools. No family history of stomach or colon cancer.     ROS  Review of Systems   Constitutional: Negative for appetite change, chills, diaphoresis, fever and unexpected weight change. HENT: Negative for ear pain, hearing loss and tinnitus. Eyes: Positive for visual disturbance. Negative for pain and discharge. Respiratory: Positive for cough (intermittent). Negative for shortness of breath and wheezing. Cardiovascular: Positive for leg swelling. Negative for chest pain and palpitations. Gastrointestinal: Negative for abdominal pain, blood in stool, constipation, diarrhea, nausea and vomiting. Endocrine: Negative for cold intolerance and heat intolerance. Genitourinary: Positive for frequency. Negative for dysuria, hematuria and urgency. Musculoskeletal: Positive for neck pain. Negative for back pain and myalgias. Skin: Negative for color change, pallor and rash. Allergic/Immunologic: Negative for environmental allergies and food allergies. Neurological: Negative for dizziness, seizures, weakness and headaches. Hematological: Does not bruise/bleed easily. Psychiatric/Behavioral: Positive for dysphoric mood. Negative for sleep disturbance. The patient is not nervous/anxious. Allergies  No Known Allergies    Medications  Current Outpatient Medications   Medication Sig Dispense Refill    Cinnamon 500 MG CAPS Take by mouth daily      ZINC PO Take by mouth      acetaminophen (TYLENOL) 325 MG tablet Take 650 mg by mouth every 6 hours as needed for Pain      Misc Natural Products (GLUCOSAMINE CHOND COMPLEX/MSM PO) Take by mouth      albuterol sulfate HFA (PROVENTIL HFA) 108 (90 BASE) MCG/ACT inhaler Inhale 2 puffs into the lungs every 4 hours as needed for Wheezing Can substitute albuterol inhaler per formulary 1 Inhaler 6    fish oil-omega-3 fatty acids 1000 MG capsule Take 2 g by mouth daily. (Takes 3-4 per day)       No current facility-administered medications for this visit.        Past medical history:   He has a past medical history of Bruised ribs, Chronic shoulder pain, Contusion of ribs, Depression, Heel pain, bilateral, Lipoma of arm, Obesity (BMI 30-39.9), STEVEN (obstructive sleep apnea), RAD (reactive airway disease), Rib pain, Right shoulder injury, Total bilirubin, elevated, Tubular adenoma of colon, and Umbilical hernia without obstruction and without gangrene. Past surgical history:  He has a past surgical history that includes Throat surgery (2010); colostomy; Colonoscopy (2011); lipoma resection (Left, 2011); Umbilical hernia repair (04/18/2018); and Colonoscopy (09/05/2018). Social History:  He reports that he has never smoked. He has quit using smokeless tobacco. He reports current alcohol use of about 8.0 - 12.0 standard drinks of alcohol per week. He reports that he does not use drugs. Family history:  His family history includes Allergies in his daughter; Asthma in his daughter; Cancer in his maternal aunt, mother, and paternal grandfather; Cancer (age of onset: 79) in his father; Depression in his daughter; Diabetes in his father; Heart Disease in his father; High Blood Pressure in his father and paternal grandmother; High Cholesterol in his father; Mental Illness in his sister; Migraines in his sister; Seizures in his son; Stroke in his paternal grandmother. Objective    Vitals:    10/18/21 1035   BP: 126/88   Pulse: 60   Temp: 97.7 °F (36.5 °C)   SpO2: 94%        Physical exam    Physical Exam  Constitutional:       General: He is not in acute distress. Appearance: He is well-developed. He is obese. He is not ill-appearing, toxic-appearing or diaphoretic. HENT:      Head: Normocephalic and atraumatic. Nose: Nose normal.      Mouth/Throat:      Mouth: Mucous membranes are moist.   Cardiovascular:      Rate and Rhythm: Normal rate and regular rhythm. Pulses: Normal pulses. Heart sounds: Normal heart sounds. No murmur heard. No gallop. Pulmonary:      Effort: Pulmonary effort is normal. No respiratory distress.       Breath sounds: Normal breath sounds. No stridor. No wheezing or rhonchi. Abdominal:      General: Bowel sounds are normal. There is no distension. Palpations: Abdomen is soft. There is no mass. Tenderness: There is no abdominal tenderness. Hernia: A hernia (umbilical ) is present. Musculoskeletal:         General: Normal range of motion. Cervical back: Neck supple. Skin:     General: Skin is warm and dry. Neurological:      Mental Status: He is alert and oriented to person, place, and time. Psychiatric:         Mood and Affect: Mood normal.         Abstract on 09/28/2021   Component Date Value Ref Range Status    OCCULT BLOOD FECAL 09/28/2021 NEGATIVE   Final   Office Visit on 09/16/2021   Component Date Value Ref Range Status    Sodium 09/16/2021 137  136 - 145 mmol/L Final    Potassium 09/16/2021 4.6  3.5 - 5.1 mmol/L Final    Chloride 09/16/2021 102  99 - 110 mmol/L Final    CO2 09/16/2021 18* 21 - 32 mmol/L Final    Anion Gap 09/16/2021 17* 3 - 16 Final    Glucose 09/16/2021 121* 70 - 99 mg/dL Final    BUN 09/16/2021 12  7 - 20 mg/dL Final    CREATININE 09/16/2021 0.8  0.8 - 1.3 mg/dL Final    GFR Non- 09/16/2021 >60  >60 Final    Comment: >60 mL/min/1.73m2 EGFR, calc. for ages 25 and older using the  MDRD formula (not corrected for weight), is valid for stable  renal function.  GFR  09/16/2021 >60  >60 Final    Comment: Chronic Kidney Disease: less than 60 ml/min/1.73 sq.m. Kidney Failure: less than 15 ml/min/1.73 sq.m. Results valid for patients 18 years and older.       Calcium 09/16/2021 9.1  8.3 - 10.6 mg/dL Final    Total Protein 09/16/2021 6.7  6.4 - 8.2 g/dL Final    Albumin 09/16/2021 4.5  3.4 - 5.0 g/dL Final    Albumin/Globulin Ratio 09/16/2021 2.0  1.1 - 2.2 Final    Total Bilirubin 09/16/2021 1.3* 0.0 - 1.0 mg/dL Final    Alkaline Phosphatase 09/16/2021 60  40 - 129 U/L Final    ALT 09/16/2021 71* 10 - 40 U/L Final    AST 09/16/2021 53* 15 - 37 U/L Final    Comment: Specimen hemolysis has exceeded the interference as defined by Roche. Value may be falsely increased. Suggest recollection if clinically  indicated.  Globulin 09/16/2021 2.2  g/dL Final    Rejected Test 09/16/2021 [id number] [id number]   Final    Reason for Rejection 09/16/2021 see below   Final    Comment: Unable to perform testing; specimen clotted. To perform testing the specimen will need to be recollected. Clotted         Assessment and Plan:  1. Elevated liver enzymes most likely due to fatty liver will order chronic liver work-up to rule out autoimmune hepatitis, Hepatitis A, B or C, Tavo's disease or PBC. Will order abdominal ultrasound to rule out abnormalities such as liver lesions or cirrhosis. The patient was encouraged to remain abstinent from alcohol and work on weight loss. 2.  Fatty liver noted on CT of abdomen in 2018 recommend weight loss at least 10% of his body weight. The patient was provided with information on fatty liver and liver disease diet. 3.  History of colon polyps with last colonoscopy done on 9-5-2018 showing internal hemorrhoids, otherwise normal results would recommend repeat colonoscopy in 5-7 years due to prior colonoscopy showing two sessile polyps. 4.  The patient was encouraged to follow-up in 3 months.

## 2021-10-18 NOTE — PATIENT INSTRUCTIONS
Patient Education        Nonalcoholic Steatohepatitis (ALVARADO): Care Instructions  Overview     Nonalcoholic steatohepatitis (ALVARADO) is liver inflammation. It is caused by a buildup of fat in the liver. The fat buildup is not caused by drinking alcohol. Because of the inflammation, the liver does not work as well as it should. ALVARADO is part of a group of liver diseases called nonalcoholic fatty liver disease. In these diseases, fat builds up in the liver and sometimes causes liver damage. This damage can get worse over time. Follow-up care is a key part of your treatment and safety. Be sure to make and go to all appointments, and call your doctor if you are having problems. It's also a good idea to know your test results and keep a list of the medicines you take. How can you care for yourself at home? · Stay at a healthy weight. Or if you need to, slowly get to a healthy weight. · Control your cholesterol. Talk to your doctor about ways to lower your cholesterol, if needed. You might try getting active, taking medicines, and making healthy changes to your diet. · Eat healthy foods. This includes fruits, vegetables, lean meats and dairy, and whole grains. · If you have diabetes, keep your blood sugar in your target range. · Get at least 30 minutes of exercise on most days of the week. Walking is a good choice. · Limit alcohol, or do not drink. Alcohol can damage the liver and cause health problems. · Get immunized. Having ALVARADO increases your risk for infections, so it's important to get all recommended vaccines. When should you call for help? Call 911 anytime you think you may need emergency care. For example, call if:    · You have trouble breathing.     · You vomit blood or what looks like coffee grounds.    Call your doctor now or seek immediate medical care if:    · You feel very sleepy or confused.     · You have new or worse belly pain.     · You have a fever.     · There is a new or increasing yellow

## 2021-10-19 ENCOUNTER — TELEPHONE (OUTPATIENT)
Dept: GASTROENTEROLOGY | Age: 61
End: 2021-10-19

## 2021-10-19 NOTE — TELEPHONE ENCOUNTER
Called pt. And made contact with his wife Sulma November. Informed her of his lab results and what gelacio recommends.  Wife stated understanding and denied further questions

## 2021-10-20 LAB
ANTI-NUCLEAR ANTIBODY (ANA): NORMAL
CERULOPLASMIN: 16 MG/DL (ref 17–54)
F-ACTIN AB, IGG: 6 UNITS (ref 0–19)
HAV AB SERPL IA-ACNC: NEGATIVE
HEPATITIS B CORE TOTAL ANTIBODY: NEGATIVE

## 2021-10-21 LAB
ALPHA-1 ANTITRYPSIN PHENOTYPE: NORMAL
ALPHA-1 ANTITRYPSIN: 120 MG/DL (ref 90–200)
HSV I/II IGG: 0.69 IV

## 2021-11-01 ENCOUNTER — TELEPHONE (OUTPATIENT)
Dept: GASTROENTEROLOGY | Age: 61
End: 2021-11-01

## 2021-11-01 NOTE — TELEPHONE ENCOUNTER
Called patient and spoke with his wife to find out if he had plans to complete abdominal ultrasound and he has it scheduled for November 3. Also discussed fibroscan to evaluate for liver fibrosis. His FIB 4 index suggests liver fibrosis between F3 and F4. Will place order for fibroscan and instructed to call if any questions or concerns.

## 2021-11-04 ENCOUNTER — PATIENT MESSAGE (OUTPATIENT)
Dept: FAMILY MEDICINE CLINIC | Age: 61
End: 2021-11-04

## 2021-11-04 NOTE — TELEPHONE ENCOUNTER
From: Liyah Husain  To: Valerio Landers MD  Sent: 11/4/2021 12:07 PM EDT  Subject: Non-Urgent Medical Question    Could a new script please be sent to [facility] in [place] for my cpap supplies? I also need any chart notes saying I'm benefitting from the machine, sent as well. [facility] is a [employer] and is local so I'd like to go thru them instead of driving to [facility] for my supplies.  Thank you

## 2021-11-05 ENCOUNTER — TELEPHONE (OUTPATIENT)
Dept: GASTROENTEROLOGY | Age: 61
End: 2021-11-05

## 2021-11-05 DIAGNOSIS — R79.89 ELEVATED FERRITIN: ICD-10-CM

## 2021-11-05 DIAGNOSIS — R74.8 ELEVATED LIVER ENZYMES: ICD-10-CM

## 2021-11-05 DIAGNOSIS — E83.00 LOW CERULOPLASMIN LEVEL: Primary | ICD-10-CM

## 2021-11-05 NOTE — TELEPHONE ENCOUNTER
Please notify patient that I discussed patient's lab work with Dr. Barrera Bruno and with his elevated Ferritin and low Ceruloplasmin it is recommended that we check blood work for [place] and rule out Tavo's disease with 24 hour urine copper evaluation and recommend he see an Ophthalmologist for spilt lamp examination to rule out [employer] ring. The lab work and 24 hour urine has been ordered.

## 2021-11-09 ENCOUNTER — TELEPHONE (OUTPATIENT)
Dept: GASTROENTEROLOGY | Age: 61
End: 2021-11-09

## 2021-11-09 NOTE — TELEPHONE ENCOUNTER
Called pt. Wife and informed her of the labs for her . Informed her if she had any questions I would tell gelacio to call her but pt. Stated understanding and denied further questions.

## 2021-12-06 ENCOUNTER — TELEPHONE (OUTPATIENT)
Dept: PULMONOLOGY | Age: 61
End: 2021-12-06

## 2021-12-06 NOTE — TELEPHONE ENCOUNTER
Pt's spouse called stating he is having a huge leak despite trying several different masks. Not able to wear it comfortably, wants to know what we would advise. Spoke to Dr. Donny De La Torre and he states pt is to see Iraj Ybarra.

## 2021-12-30 ENCOUNTER — OFFICE VISIT (OUTPATIENT)
Dept: ORTHOPEDIC SURGERY | Age: 61
End: 2021-12-30
Payer: COMMERCIAL

## 2021-12-30 VITALS
WEIGHT: 281 LBS | HEIGHT: 71 IN | OXYGEN SATURATION: 95 % | BODY MASS INDEX: 39.34 KG/M2 | HEART RATE: 66 BPM | RESPIRATION RATE: 16 BRPM

## 2021-12-30 DIAGNOSIS — M25.561 RIGHT KNEE PAIN, UNSPECIFIED CHRONICITY: Primary | ICD-10-CM

## 2021-12-30 PROCEDURE — 20610 DRAIN/INJ JOINT/BURSA W/O US: CPT

## 2021-12-30 ASSESSMENT — ENCOUNTER SYMPTOMS
FACIAL SWELLING: 0
COUGH: 0
SHORTNESS OF BREATH: 0
NAUSEA: 0
RHINORRHEA: 0
VOMITING: 0

## 2021-12-30 NOTE — PROGRESS NOTES
Garcia Dewitt is a 64 y.o. male returning to the office complaining of pain in the right knee. Pt received a steroid injection on 7/27/21. He states the injection worked very well relieving his pain. He states the pain has returned and is having an aching pain located on the lateral aspect of his right knee. Pt rates the pain 7/10 when weightbearing.  He states he has no pain at rest. Pt would like to discuss a  Knee injection and reports no new injuries

## 2021-12-30 NOTE — PROGRESS NOTES
12/30/2021   Chief Complaint   Patient presents with    Knee Pain     Right        History of Present Illness:                             Ciara Rucker is a 64 y.o. male return to the office for the management of his right knee pain. Patient states he has received cortisone injections for his right knee with successful pain relief and ability to resume his normal daily activities. Patient states his pain has returned over the last month and is located primarily on the lateral aspect of his right knee. Patient states his pain is a 7/10 when weightbearing and he has no pain at rest.  Patient denies new injury to the knee or complication elated to prior injection. Wan Gresham is a 64 y.o. male returning to the office complaining of pain in the right knee. Pt received a steroid injection on 7/27/21. He states the injection worked very well relieving his pain. He states the pain has returned and is having an aching pain located on the lateral aspect of his right knee. Pt rates the pain 7/10 when weightbearing. He states he has no pain at rest. Pt would like to discuss a  Knee injection and reports no new injuries    Medical History  Patient's medications, allergies, past medical, surgical, social and family histories were reviewed and updated as appropriate. Review of Systems   Constitutional: Negative for fever. HENT: Negative for facial swelling and rhinorrhea. Respiratory: Negative for cough and shortness of breath. Cardiovascular: Negative for chest pain. Gastrointestinal: Negative for nausea and vomiting. Musculoskeletal:        Patient admits to pain on the lateral aspect of his right knee when weightbearing. Patient denies new injury to the right knee or complications with prior injection. Skin: Negative for pallor and rash. Neurological: Negative for facial asymmetry and speech difficulty. Psychiatric/Behavioral: Negative for agitation and confusion. Examination:  General Exam:  Vitals: Pulse 66   Resp 16   Ht 5' 11\" (1.803 m)   Wt 281 lb (127.5 kg)   SpO2 95%   BMI 39.19 kg/m²    Physical Exam  Constitutional:       Appearance: He is obese. HENT:      Head: Normocephalic. Eyes:      General: No scleral icterus. Pulmonary:      Effort: Pulmonary effort is normal.   Musculoskeletal:      Cervical back: Normal range of motion. Right knee: No swelling or deformity. Normal range of motion. Tenderness present over the lateral joint line. No MCL, LCL, ACL, PCL or patellar tendon tenderness. No LCL laxity, MCL laxity, ACL laxity or PCL laxity. Normal pulse. Instability Tests: Anterior drawer test negative. Posterior drawer test negative. Anterior Lachman test negative. Medial Beverly test negative and lateral Beverly test negative. Left knee: Normal.   Skin:     General: Skin is warm and dry. Capillary Refill: Capillary refill takes less than 2 seconds. Neurological:      General: No focal deficit present. Mental Status: He is alert and oriented to person, place, and time. Psychiatric:         Mood and Affect: Mood normal.         Behavior: Behavior normal.              Diagnostic testing:  X-rays reviewed in office, I independently reviewed the films in the office today:         Office Procedures:  No orders of the defined types were placed in this encounter. Assessment and Plan  1 primary osteoarthritis of the right knee. -Procedure Note, Right Knee Intra-articular Injection:     The right knee was prepped with alcohol. A 22 gauge needle was inserted into the knee joint. The knee was then injected with 80 mg of Kenalog and 4 mL of 1% plain lidocaine. A sterile Band-Aid was applied. The patient tolerated the procedure well with no complications. Continue home exercise program and weight loss.   Follow-up in 6 months for check of hisprogress        Electronically signed by Tod Padilla PA-C on 12/30/2021 at 9:23 AM

## 2021-12-30 NOTE — PATIENT INSTRUCTIONS
Was given a steroid injection  Weightbearing and activities as tolerated  May take Ibuprofen or Motrin as needed  Rest, ice, and elevate as needed  Work on ROM and strengthening exercises as discussed  Follow up in 6 months

## 2022-01-19 ENCOUNTER — TELEPHONE (OUTPATIENT)
Dept: PULMONOLOGY | Age: 62
End: 2022-01-19

## 2022-01-19 NOTE — TELEPHONE ENCOUNTER
Pt spouse states that Azul Lyon has been having several issues with the pressure from his CPAP. They have tried several masks but believe the issue is that too much air is blowing so it is causing discomfort. He has an appt 2/4 but is hoping maybe we can adjust the pressure before to see if it helps.   Please advise

## 2022-02-01 ENCOUNTER — VIRTUAL VISIT (OUTPATIENT)
Dept: PULMONOLOGY | Age: 62
End: 2022-02-01
Payer: COMMERCIAL

## 2022-02-01 DIAGNOSIS — G47.33 OSA (OBSTRUCTIVE SLEEP APNEA): ICD-10-CM

## 2022-02-01 DIAGNOSIS — G47.10 HYPERSOMNIA: ICD-10-CM

## 2022-02-01 PROCEDURE — 99213 OFFICE O/P EST LOW 20 MIN: CPT | Performed by: INTERNAL MEDICINE

## 2022-02-01 ASSESSMENT — ENCOUNTER SYMPTOMS
EYE ITCHING: 0
EYE DISCHARGE: 0
BACK PAIN: 0
ABDOMINAL DISTENTION: 0
COUGH: 0
SHORTNESS OF BREATH: 0
ABDOMINAL PAIN: 0

## 2022-02-01 NOTE — ASSESSMENT & PLAN NOTE
Since he is not able to tolerate the fix CPAP pressure  I'll give him Auto CPAP  Loose weight  Need 2 week download data
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
## 2022-02-15 ENCOUNTER — OFFICE VISIT (OUTPATIENT)
Dept: INTERNAL MEDICINE CLINIC | Age: 62
End: 2022-02-15
Payer: COMMERCIAL

## 2022-02-15 VITALS
BODY MASS INDEX: 39.62 KG/M2 | HEIGHT: 71 IN | HEART RATE: 86 BPM | RESPIRATION RATE: 18 BRPM | OXYGEN SATURATION: 96 % | SYSTOLIC BLOOD PRESSURE: 132 MMHG | DIASTOLIC BLOOD PRESSURE: 88 MMHG | WEIGHT: 283 LBS

## 2022-02-15 DIAGNOSIS — R74.01 TRANSAMINITIS: ICD-10-CM

## 2022-02-15 DIAGNOSIS — E78.5 DYSLIPIDEMIA: ICD-10-CM

## 2022-02-15 DIAGNOSIS — Z99.89 OSA ON CPAP: ICD-10-CM

## 2022-02-15 DIAGNOSIS — R73.01 IMPAIRED FASTING GLUCOSE: ICD-10-CM

## 2022-02-15 DIAGNOSIS — E07.9 THYROID DISORDER: ICD-10-CM

## 2022-02-15 DIAGNOSIS — G47.33 OSA ON CPAP: ICD-10-CM

## 2022-02-15 DIAGNOSIS — J45.991 COUGH VARIANT ASTHMA: Primary | ICD-10-CM

## 2022-02-15 PROCEDURE — 99204 OFFICE O/P NEW MOD 45 MIN: CPT | Performed by: INTERNAL MEDICINE

## 2022-02-15 SDOH — ECONOMIC STABILITY: FOOD INSECURITY: WITHIN THE PAST 12 MONTHS, THE FOOD YOU BOUGHT JUST DIDN'T LAST AND YOU DIDN'T HAVE MONEY TO GET MORE.: NEVER TRUE

## 2022-02-15 SDOH — ECONOMIC STABILITY: FOOD INSECURITY: WITHIN THE PAST 12 MONTHS, YOU WORRIED THAT YOUR FOOD WOULD RUN OUT BEFORE YOU GOT MONEY TO BUY MORE.: NEVER TRUE

## 2022-02-15 ASSESSMENT — PATIENT HEALTH QUESTIONNAIRE - PHQ9
SUM OF ALL RESPONSES TO PHQ QUESTIONS 1-9: 3
10. IF YOU CHECKED OFF ANY PROBLEMS, HOW DIFFICULT HAVE THESE PROBLEMS MADE IT FOR YOU TO DO YOUR WORK, TAKE CARE OF THINGS AT HOME, OR GET ALONG WITH OTHER PEOPLE: 0
2. FEELING DOWN, DEPRESSED OR HOPELESS: 1
1. LITTLE INTEREST OR PLEASURE IN DOING THINGS: 0
5. POOR APPETITE OR OVEREATING: 2
6. FEELING BAD ABOUT YOURSELF - OR THAT YOU ARE A FAILURE OR HAVE LET YOURSELF OR YOUR FAMILY DOWN: 0
SUM OF ALL RESPONSES TO PHQ QUESTIONS 1-9: 3
SUM OF ALL RESPONSES TO PHQ9 QUESTIONS 1 & 2: 1
3. TROUBLE FALLING OR STAYING ASLEEP: 0
7. TROUBLE CONCENTRATING ON THINGS, SUCH AS READING THE NEWSPAPER OR WATCHING TELEVISION: 0
9. THOUGHTS THAT YOU WOULD BE BETTER OFF DEAD, OR OF HURTING YOURSELF: 0
SUM OF ALL RESPONSES TO PHQ QUESTIONS 1-9: 3
8. MOVING OR SPEAKING SO SLOWLY THAT OTHER PEOPLE COULD HAVE NOTICED. OR THE OPPOSITE, BEING SO FIGETY OR RESTLESS THAT YOU HAVE BEEN MOVING AROUND A LOT MORE THAN USUAL: 0
4. FEELING TIRED OR HAVING LITTLE ENERGY: 0
SUM OF ALL RESPONSES TO PHQ QUESTIONS 1-9: 3

## 2022-02-15 ASSESSMENT — SOCIAL DETERMINANTS OF HEALTH (SDOH): HOW HARD IS IT FOR YOU TO PAY FOR THE VERY BASICS LIKE FOOD, HOUSING, MEDICAL CARE, AND HEATING?: NOT HARD AT ALL

## 2022-02-15 NOTE — PROGRESS NOTES
Patient uses cpap    Patient has not had a colonoscopy. He did do a home test last year. Dr Debora Borjas.

## 2022-02-15 NOTE — PATIENT INSTRUCTIONS
Central [phone]            Fasting for a blood test: taking the right steps before testing helps ensure your results will be accurate. Why do I need to fast before my blood test?  If your healthcare provider has told you to fast before a blood test, it means you should not eat or drink anything, except water, for several hours before your test. When you eat and drink normally, those foods and beverages are absorbed into your bloodstream. That could affect the results of certain types of blood tests. What types of blood tests require fasting? The most common tests that require fasting are:   Glucose tests, which measure your blood sugar.  Lipid tests, which measure cholesterol and triglycerides. You do not need to be fasting for HbA1C test.     How long do I have to fast before the test?  You usually need to fast for 812 hours before the test. Most tests that require fasting are scheduled for early in the morning. That way, most of your fasting time will be overnight. Can I drink anything besides water during a fast?  No. Juice, coffee, soda, and other beverages can get in your bloodstream and affect your results. In addition, you should not:   Chew gum    Smoke    Exercise  These activities can also affect your results. But you can drink water. It's actually encouraged that you drink 2 glasses of water before any blood test. It helps keep more fluid in your veins, which can make it easier to draw blood. If you are dehydrated, your blood draw experience may be unpleasant. Can I continue taking medicine during a fast?  Most of the time it's OK to take your usual medicines with water, unless otherwise specified by your healthcare provider. You may need to avoid certain medicines that you normally take with food.      What if I make a mistake and have something to eat or drink besides water during my fast?  Tell your healthcare provider before your test. Your test will most

## 2022-02-15 NOTE — PROGRESS NOTES
2/15/22    Юлия Perales  1960    Chief Complaint   Patient presents with   Katherin Thomson Established New Doctor       History of Present Illness:  Юлия Perales is a 64 y.o. pleasant gentleman presenting today to establish care as a new patient with a chief complaint of transaminitis. He has a past medical history significant for:  HL (,  on 5/18/2021), on Omega-3   NAFLD  Cough-variant asthma, on Albuterol inh PRN   STEVEN s/p UPPP, on CPAP QHS  OA  Depression, off Celexa    Morbid obesity (BMI 40)  S/p umbilical hernia repair (Dr. Mart Beverly)   Never smoker   Supplements: Cinnamon, Chondroitin, Zinc    # BP today 132/88. He is not on anti-hypertensive. # Sees Dr. Onur Edmondson for STEVEN. Uses a CPAP. # Colonoscopy in 2014 with 2 polyps. Repeat in 2018 recommended 10-year FU. Had FIT test in 2021. # Seeing GI for elevated ALT, AST, Tbili. Saw GI once. Has not followed through with remainder of labs or US. Has seen Ophtho Dr. Ck Rhoades for possible Kayser-Fleischer which was unremarkable.      Going to [facility] next month ([place], [place], maybe [place])      Health maintenance:   Health Maintenance Due   Topic Date Due    COVID-19 Vaccine (1) Never done    Depression Monitoring  Never done    Colon cancer screen colonoscopy  11/03/2019    A1C test (Diabetic or Prediabetic)  05/19/2021         Review of Systems:  Constitutional: no fevers, no chills, no night sweats, no weight loss, no weight gain, no fatigue   Pain assessment: OA pains  Head: no headaches  Ears: no hearing loss, no tinnitus, no vertigo  Eyes: no blurry vision, no diplopia, no dryness, no itchiness  Mouth: no oral ulcers, no dry mouth, no sore throat  Nose: no nasal congestion, no epistaxis  Cardiac: no chest pain, no palpitations, no leg swelling, no orthopnea, no PND, no syncope  Pulmonary: no dyspnea, no cough, no wheezing, no hemoptysis  GI: no nausea, no vomiting, no diarrhea, no constipation, no abdominal pain, no hematochezia  : no dysuria, no frequency, no urgency, no hematuria, no frothy urine  MSK: no Raynaud's   Neuro: no focal neurological deficits, no seizures  Sleep: no snoring, no daytime somnolence   Psych: no depression, no anxiety, no suicidal ideation      Physical Exam:  VITALS:   /88 (Site: Left Upper Arm, Position: Sitting, Cuff Size: Large Adult)   Pulse 86   Resp 18   Ht 5' 10.5\" (1.791 m)   Wt 283 lb (128.4 kg)   SpO2 96%   BMI 40.03 kg/m²     PHYSICAL EXAMINATION:  General: alert, awake, and oriented to time, place, person, and situation. Not in acute distress. ?flat affect  Skin:  no suspicious rashes, no jaundice  Head: normocephalic/atraumatic  Eyes: anicteric sclera, well-injected conjunctiva. Pupils are equally round and reactive to light. Extraocular movements are intact   Nose: no septal deviation evident  Sinuses: no sinus tenderness  Ears: external ears normal  Neck: supple, no cervical lymphadenopathy, thyroid symmetric and not enlarged, no bruits   Heart: regular rate and rhythm, regular S1/S2, no S3/S4, no audible murmurs, no audible friction rub  Lungs: clear to auscultation bilaterally, no audible crackles, no audible wheezes, no audible rhonchi    Abdomen: normal bowel sounds, soft abdomen, non-tender, no palpable masses  Extremities: no edema, warm, no cyanosis, no clubbing. Good capillary refill   MSK: no tenderness across spinous processes, full ROM in all 4 extremities.  No joint swelling or tenderness   Peripheral vascular: 2+ pulses symmetric (radial)  Neuro: gait normal, CN II-XII intact, motor power 5/5 in all 4 extremities, sensation intact and symmetric    Labs   I have personally reviewed labs, and discussed pertinent findings with patient on this date 2/15/2022     Imaging   I have personally reviewed imaging, and discussed pertinent findings with patient on this date 2/15/2022     Other notes  I have personally reviewed other notes, and discussed pertinent findings with patient on this date 2/15/2022       Assessment/Plan:     1. Cough variant asthma  Stable  Continue Albuterol inh PRN     2. Transaminitis  Was being worked up by GI  Needs US  Needs FU labs  Has not had full Tavo's work up done   - ProZyme Differential; Future  - COMPREHENSIVE METABOLIC PANEL; Future  - US ABDOMINAL LIMITED; Future    3. STEVEN on CPAP  S/p UPPP   Continue CPAP   Follows with Dr. Jayde Moore    4. Impaired fasting glucose  - HEMOGLOBIN A1C; Future    5. Dyslipidemia  ,  in May 2021  Continue Omega-3 per patient preference   - CBC Auto Differential; Future  - COMPREHENSIVE METABOLIC PANEL; Future  - Lipid, Fasting; Future    6. Thyroid disorder  - TSH with Reflex; Future      Care discussed with patient and questions answered. Patient verbalizes understanding and agrees with plan. Discussed with patient the importance of continuity of care. I encouraged patient to schedule next appointment within 3 months with me. Patient prefers to be reached by Phone call at [phone] for future medical correspondence. Encouraged to activate [employer]. I reviewed and reconciled the medications this visit. I reviewed and updated the past medical, surgical, social, and family history during this visit. After visit summary provided.        Isaura Ram MD  Internal Medicine  2/15/2022   2:13 PM

## 2022-02-18 ENCOUNTER — HOSPITAL ENCOUNTER (OUTPATIENT)
Age: 62
Discharge: HOME | End: 2022-02-18
Payer: COMMERCIAL

## 2022-02-18 ENCOUNTER — HOSPITAL ENCOUNTER (OUTPATIENT)
Dept: ULTRASOUND IMAGING | Age: 62
Discharge: HOME | End: 2022-02-18
Payer: COMMERCIAL

## 2022-02-18 DIAGNOSIS — R17 ELEVATED BILIRUBIN: ICD-10-CM

## 2022-02-18 DIAGNOSIS — R74.01 TRANSAMINITIS: Primary | ICD-10-CM

## 2022-02-18 DIAGNOSIS — R74.01 TRANSAMINITIS: ICD-10-CM

## 2022-02-18 LAB
ALBUMIN SERPL-MCNC: 4.3 GM/DL (ref 3.4–5)
ALP BLD-CCNC: 54 IU/L (ref 40–129)
ALT SERPL-CCNC: 104 U/L (ref 10–40)
ANION GAP SERPL CALCULATED.3IONS-SCNC: 10 MMOL/L (ref 4–16)
AST SERPL-CCNC: 72 IU/L (ref 15–37)
BILIRUB SERPL-MCNC: 2.1 MG/DL (ref 0–1)
BUN BLDV-MCNC: 16 MG/DL (ref 6–23)
CALCIUM SERPL-MCNC: 9.3 MG/DL (ref 8.3–10.6)
CHLORIDE BLD-SCNC: 104 MMOL/L (ref 99–110)
CO2: 23 MMOL/L (ref 21–32)
CREAT SERPL-MCNC: 1 MG/DL (ref 0.9–1.3)
ESTIMATED AVERAGE GLUCOSE: 100 MG/DL
GFR AFRICAN AMERICAN: >60 ML/MIN/1.73M2
GFR NON-AFRICAN AMERICAN: >60 ML/MIN/1.73M2
GLUCOSE FASTING: 110 MG/DL (ref 70–99)
HBA1C MFR BLD: 5.1 % (ref 4.2–6.3)
POTASSIUM SERPL-SCNC: 4.3 MMOL/L (ref 3.5–5.1)
SODIUM BLD-SCNC: 137 MMOL/L (ref 135–145)
TOTAL PROTEIN: 6.3 GM/DL (ref 6.4–8.2)
TSH HIGH SENSITIVITY: 3.57 UIU/ML (ref 0.27–4.2)

## 2022-02-18 PROCEDURE — 80061 LIPID PANEL: CPT

## 2022-02-18 PROCEDURE — 83036 HEMOGLOBIN GLYCOSYLATED A1C: CPT

## 2022-02-18 PROCEDURE — 80053 COMPREHEN METABOLIC PANEL: CPT

## 2022-02-18 PROCEDURE — 76705 ECHO EXAM OF ABDOMEN: CPT

## 2022-02-18 PROCEDURE — 84443 ASSAY THYROID STIM HORMONE: CPT

## 2022-02-18 PROCEDURE — 36415 COLL VENOUS BLD VENIPUNCTURE: CPT

## 2022-02-18 PROCEDURE — 84439 ASSAY OF FREE THYROXINE: CPT

## 2022-02-21 LAB
CHOLESTEROL, FASTING: 184 MG/DL
HDLC SERPL-MCNC: 46 MG/DL
LDL CHOLESTEROL CALCULATED: 109 MG/DL
T4 FREE: 1.13 NG/DL (ref 0.9–1.8)
TRIGLYCERIDE, FASTING: 147 MG/DL

## 2022-02-27 ENCOUNTER — PATIENT MESSAGE (OUTPATIENT)
Dept: INTERNAL MEDICINE CLINIC | Age: 62
End: 2022-02-27

## 2022-02-28 RX ORDER — MELOXICAM 7.5 MG/1
7.5 TABLET ORAL DAILY PRN
Qty: 30 TABLET | Refills: 1 | Status: SHIPPED | OUTPATIENT
Start: 2022-02-28

## 2022-02-28 NOTE — TELEPHONE ENCOUNTER
From: Crystal Larkin  To: Dr. Ade Morrison  Sent: 2/27/2022 8:31 AM EST  Subject: Experiencing increased pain in knee and leg    Dr Bernardo Swann,  Could I talk to you about getting something to help the pain in my knee and leg? I get cortisone shots from , but just had one Dec 28th and can only get them every three months. Dr Fernando Puentes says its arthritis. Due to this increased pain, I can't get around and exercise like I should, and I am gaining weight, which is making the pain worse. My family and I are going to [facility] on March 12th, and I'm worried that I won't be able to walk around with them.   Thank you

## 2022-03-03 ENCOUNTER — HOSPITAL ENCOUNTER (OUTPATIENT)
Age: 62
Discharge: HOME | End: 2022-03-03
Payer: COMMERCIAL

## 2022-03-03 LAB
ALBUMIN SERPL-MCNC: 4.4 GM/DL (ref 3.4–5)
ALCOHOL SCREEN SERUM: <0.01 %WT/VOL
ALP BLD-CCNC: 56 IU/L (ref 40–129)
ALT SERPL-CCNC: 101 U/L (ref 10–40)
AMPHETAMINES: NEGATIVE
APTT: 29.8 SECONDS (ref 25.1–37.1)
AST SERPL-CCNC: 63 IU/L (ref 15–37)
BARBITURATE SCREEN URINE: NEGATIVE
BENZODIAZEPINE SCREEN, URINE: NEGATIVE
BILIRUB SERPL-MCNC: 2.1 MG/DL (ref 0–1)
BILIRUBIN DIRECT: 0.3 MG/DL (ref 0–0.3)
BILIRUBIN, INDIRECT: 1.8 MG/DL (ref 0–0.7)
CANNABINOID SCREEN URINE: NEGATIVE
COCAINE METABOLITE: NEGATIVE
CORTISOL - AM: 16.6 UG/DL (ref 6–18.4)
FERRITIN: 1524 NG/ML (ref 30–400)
HAV IGM SER IA-ACNC: NON REACTIVE
HEPATITIS B CORE IGM ANTIBODY: NON REACTIVE
HEPATITIS B SURFACE ANTIGEN: NON REACTIVE
HEPATITIS C ANTIBODY: NON REACTIVE
IGA: 317 MG/DL (ref 69–382)
INR BLD: 1 INDEX
IRON: 194 UG/DL (ref 59–158)
OPIATES, URINE: NEGATIVE
OXYCODONE: NEGATIVE
PCT TRANSFERRIN: 68 % (ref 10–44)
PHENCYCLIDINE, URINE: NEGATIVE
PROTHROMBIN TIME: 12.4 SECONDS (ref 11.7–14.5)
TOTAL CK: 100 IU/L (ref 38–174)
TOTAL IRON BINDING CAPACITY: 284 UG/DL (ref 250–450)
TOTAL PROTEIN: 6.3 GM/DL (ref 6.4–8.2)
UNSATURATED IRON BINDING CAPACITY: 90 UG/DL (ref 110–370)

## 2022-03-03 PROCEDURE — 85730 THROMBOPLASTIN TIME PARTIAL: CPT

## 2022-03-03 PROCEDURE — 82728 ASSAY OF FERRITIN: CPT

## 2022-03-03 PROCEDURE — 83516 IMMUNOASSAY NONANTIBODY: CPT

## 2022-03-03 PROCEDURE — 82784 ASSAY IGA/IGD/IGG/IGM EACH: CPT

## 2022-03-03 PROCEDURE — 80076 HEPATIC FUNCTION PANEL: CPT

## 2022-03-03 PROCEDURE — 36415 COLL VENOUS BLD VENIPUNCTURE: CPT

## 2022-03-03 PROCEDURE — 86038 ANTINUCLEAR ANTIBODIES: CPT

## 2022-03-03 PROCEDURE — 85610 PROTHROMBIN TIME: CPT

## 2022-03-03 PROCEDURE — 83550 IRON BINDING TEST: CPT

## 2022-03-03 PROCEDURE — 80074 ACUTE HEPATITIS PANEL: CPT

## 2022-03-03 PROCEDURE — 82550 ASSAY OF CK (CPK): CPT

## 2022-03-03 PROCEDURE — G0480 DRUG TEST DEF 1-7 CLASSES: HCPCS

## 2022-03-03 PROCEDURE — 82085 ASSAY OF ALDOLASE: CPT

## 2022-03-03 PROCEDURE — 82024 ASSAY OF ACTH: CPT

## 2022-03-03 PROCEDURE — 82390 ASSAY OF CERULOPLASMIN: CPT

## 2022-03-03 PROCEDURE — 83540 ASSAY OF IRON: CPT

## 2022-03-03 PROCEDURE — 82103 ALPHA-1-ANTITRYPSIN TOTAL: CPT

## 2022-03-03 PROCEDURE — 82533 TOTAL CORTISOL: CPT

## 2022-03-03 PROCEDURE — 80307 DRUG TEST PRSMV CHEM ANLYZR: CPT

## 2022-03-05 LAB
ANTI-NUCLEAR ANTIBODY (ANA): NORMAL
F-ACTIN AB, IGG: 5 UNITS (ref 0–19)
TISSUE TRANSGLUTAMINASE ANTIBODY: 4 U/ML (ref 0–5)
TRANSGLUTAMINASE IGA: <2 U/ML (ref 0–3)

## 2022-03-06 LAB
ALDOLASE: 9.3 U/L (ref 1.2–7.6)
ALPHA-1 ANTITRYPSIN: 134 MG/DL (ref 90–200)

## 2022-03-08 LAB
ADRENOCORTICOTROPIC HORMONE: 30.6 PG/ML (ref 7.2–63.3)
CERULOPLASMIN: 17 MG/DL (ref 15–30)

## 2022-03-11 ENCOUNTER — PATIENT MESSAGE (OUTPATIENT)
Dept: INTERNAL MEDICINE CLINIC | Age: 62
End: 2022-03-11

## 2022-03-24 NOTE — TELEPHONE ENCOUNTER
From: Adilene Barba  Sent: 3/23/2022 4:18 PM EDT  To: Tessa Mcfarland Clinical Staff  Subject: My blood test results    I hadn't heard back from anyone concerning my March 11 message about my blood work. I am hoping this means nothing about my bloodwork was concerning! But I thought I'd send a message just to check.    Thank you

## 2022-03-30 ENCOUNTER — TELEPHONE (OUTPATIENT)
Dept: INTERNAL MEDICINE CLINIC | Age: 62
End: 2022-03-30

## 2022-03-30 NOTE — TELEPHONE ENCOUNTER
Attempted to call significant other however there was no answer; voicemail message left; ultrasound showed fatty liver, blood work is consistent with impaired liver function and fatty liver/liver damage. Overall, no clear diagnosis at this time; patient was previously being worked up by GI and FibroScan was ordered however this was canceled. Would recommend pt have this completed / get back in with GI.     ALT/AST, Bilirubin; Ferritin/iron and aldolase values elevated; hepatitis panel normal; ceruloplasmin normal, autoimmune labs normal; overall likely indicative of liver damage/ impaired liver function. U/S:      FINDINGS:   LIVER:  The liver is diffusely increased in echogenicity.  No focal hepatic   lesion is identified.       BILIARY SYSTEM:  Gallbladder is unremarkable without evidence of   pericholecystic fluid, wall thickening or stones.  Negative sonographic   Steward sign.       Common bile duct is within normal limits measuring 5 mm.       RIGHT KIDNEY: The right kidney is grossly unremarkable without evidence of   hydronephrosis.       PANCREAS:  Visualized portions of the pancreas are unremarkable.       OTHER: No evidence of right upper quadrant ascites.           Impression   Hepatic steatosis.

## 2022-03-30 NOTE — TELEPHONE ENCOUNTER
Patient's wife called and they are wanting to know if there is anything else with the labs results. They thought some of the labs would give them information on liver function ect. Can you please review.

## 2022-04-01 ENCOUNTER — TELEPHONE (OUTPATIENT)
Dept: INTERNAL MEDICINE CLINIC | Age: 62
End: 2022-04-01

## 2022-04-01 NOTE — TELEPHONE ENCOUNTER
Patients wife called wanting to speak to you. Patient has questions about the iron level and what was checked and why.

## 2022-04-04 NOTE — TELEPHONE ENCOUNTER
Called by back and spoke with her regarding patient's labs; recommend patient reschedule appointment with GI for consideration of further evaluation; patient was previously established letter and FibroScan was previously ordered however this was canceled by patient.

## 2022-04-09 ENCOUNTER — PATIENT MESSAGE (OUTPATIENT)
Dept: INTERNAL MEDICINE CLINIC | Age: 62
End: 2022-04-09

## 2022-04-09 DIAGNOSIS — E66.01 CLASS 3 SEVERE OBESITY WITH SERIOUS COMORBIDITY AND BODY MASS INDEX (BMI) OF 40.0 TO 44.9 IN ADULT, UNSPECIFIED OBESITY TYPE (HCC): Primary | ICD-10-CM

## 2022-04-09 NOTE — TELEPHONE ENCOUNTER
From: Clover Trejo  To: Dr. Hayden Power  Sent: 4/9/2022 11:52 AM EDT  Subject: Weight loss    Could I be referred to weight management? My wife and I have been talking about medications that assist in weight loss. I think getting some weight off would really help with my knee pain and sleep apnea, but because of the pain, I've become more sedentary which has caused weight gain. I weighed in this morning at 275.    Thank you   Friendly

## 2022-04-14 ENCOUNTER — OFFICE VISIT (OUTPATIENT)
Dept: ORTHOPEDIC SURGERY | Age: 62
End: 2022-04-14
Payer: COMMERCIAL

## 2022-04-14 VITALS
BODY MASS INDEX: 38.5 KG/M2 | WEIGHT: 275 LBS | HEART RATE: 77 BPM | HEIGHT: 71 IN | RESPIRATION RATE: 16 BRPM | OXYGEN SATURATION: 95 %

## 2022-04-14 DIAGNOSIS — M17.12 ARTHRITIS OF LEFT KNEE: Primary | ICD-10-CM

## 2022-04-14 PROCEDURE — 20610 DRAIN/INJ JOINT/BURSA W/O US: CPT

## 2022-04-14 NOTE — PROGRESS NOTES
Hailey Benton is a 64 y.o. male returning to the office complaining of left knee pain. Pt has been seen in the office in the past for his right knee. Pt states that he was working out in his garden and started to experience throbbing pain with intermittent sharp pain centralized around the patella. He states that he has been wearing a knee brace with has help with the pain. He denies any previous injury.

## 2022-04-14 NOTE — PATIENT INSTRUCTIONS
Continue to weight bear as tolerated  Continue range of motion   Ice and elevate as needed  Tylenol or motrin for pain   Injection given today in office   Rest for 24-48 hours    Patient Education        Knee Arthritis: Exercises  Introduction  Here are some examples of exercises for you to try. The exercises may be suggested for a condition or for rehabilitation. Start each exercise slowly. Ease off the exercises if you start to have pain. You will be told when to start these exercises and which ones will work bestfor you. How to do the exercises  Knee flexion with heel slide    1. Lie on your back with your knees bent. 2. Slide your heel back by bending your affected knee as far as you can. Then hook your other foot around your ankle to help pull your heel even farther back. 3. Hold for about 6 seconds, then rest for up to 10 seconds. 4. Repeat 8 to 12 times. 5. Switch legs and repeat steps 1 through 4, even if only one knee is sore. Quad sets    1. Sit with your affected leg straight and supported on the floor or a firm bed. Place a small, rolled-up towel under your knee. Your other leg should be bent, with that foot flat on the floor. 2. Tighten the thigh muscles of your affected leg by pressing the back of your knee down into the towel. 3. Hold for about 6 seconds, then rest for up to 10 seconds. 4. Repeat 8 to 12 times. 5. Switch legs and repeat steps 1 through 4, even if only one knee is sore. Straight-leg raises to the front    1. Lie on your back with your good knee bent so that your foot rests flat on the floor. Your affected leg should be straight. Make sure that your low back has a normal curve. You should be able to slip your hand in between the floor and the small of your back, with your palm touching the floor and your back touching the back of your hand. 2. Tighten the thigh muscles in your affected leg by pressing the back of your knee flat down to the floor.  Hold your knee straight. 3. Keeping the thigh muscles tight and your leg straight, lift your affected leg up so that your heel is about 12 inches off the floor. Hold for about 6 seconds, then lower slowly. 4. Relax for up to 10 seconds between repetitions. 5. Repeat 8 to 12 times. 6. Switch legs and repeat steps 1 through 5, even if only one knee is sore. Active knee flexion    1. Lie on your stomach with your knees straight. If your kneecap is uncomfortable, roll up a washcloth and put it under your leg just above your kneecap. 2. Lift the foot of your affected leg by bending the knee so that you bring the foot up toward your buttock. If this motion hurts, try it without bending your knee quite as far. This may help you avoid any painful motion. 3. Slowly move your leg up and down. 4. Repeat 8 to 12 times. 5. Switch legs and repeat steps 1 through 4, even if only one knee is sore. Quadriceps stretch (facedown)    1. Lie flat on your stomach, and rest your face on the floor. 2. Wrap a towel or belt strap around the lower part of your affected leg. Then use the towel or belt strap to slowly pull your heel toward your buttock until you feel a stretch. 3. Hold for about 15 to 30 seconds, then relax your leg against the towel or belt strap. 4. Repeat 2 to 4 times. 5. Switch legs and repeat steps 1 through 4, even if only one knee is sore. Stationary exercise bike    1. If you do not have a stationary exercise bike at home, you can find one to ride at your local health club or community center. 2. Adjust the height of the bike seat so that your knee is slightly bent when your leg is extended downward. If your knee hurts when the pedal reaches the top, you can raise the seat so that your knee does not bend as much. 3. Start slowly. At first, try to do 5 to 10 minutes of cycling with little to no resistance. Then increase your time and the resistance bit by bit until you can do 20 to 30 minutes without pain.   4. If you start to have pain, rest your knee until your pain gets back to the level that is normal for you. Or cycle for less time or with less effort. Follow-up care is a key part of your treatment and safety. Be sure to make and go to all appointments, and call your doctor if you are having problems. It's also a good idea to know your test results and keep alist of the medicines you take. Where can you learn more? Go to https://[employer]. org and sign in to your [employer] account. Enter C159 in the Fastnote box to learn more about \"Knee Arthritis: Exercises. \"     If you do not have an account, please click on the \"Sign Up Now\" link. Current as of: July 1, 2021               Content Version: 13.2  © 9508-2842 Healthwise, Incorporated. Care instructions adapted under license by [facility] If you have questions about a medical condition or this instruction, always ask your healthcare professional. [place] [address] any warranty or liability for your use of this information.

## 2022-04-17 ASSESSMENT — ENCOUNTER SYMPTOMS
COUGH: 0
RHINORRHEA: 0
BACK PAIN: 0
NAUSEA: 0
SHORTNESS OF BREATH: 0
FACIAL SWELLING: 0

## 2022-04-17 NOTE — PROGRESS NOTES
4/14/2022   Chief Complaint   Patient presents with    Knee Pain     left        History of Present Illness:                             Eliazar Brandt is a 64 y.o. male in the office today for chronic intermittent left knee pain. Patient states that he has been seen here in the past for knee pain in both his left and right knee. He has received cortisone injections in the past with success and resolution of his symptoms. Patient states his pain is on his left knee on the anterior lateral side. He states he utilizes a knee brace which helps but that with increased activity this spring he has noticed more pain and swelling of his knee joint. Patient states he would like to receive a cortisone injection as those have been of great benefit previously. Tayo Vargas is a 64 y.o. male returning to the office complaining of left knee pain. Pt has been seen in the office in the past for his right knee. Pt states that he was working out in his garden and started to experience throbbing pain with intermittent sharp pain centralized around the patella. He states that he has been wearing a knee brace with has help with the pain. He denies any previous injury. Medical History  Patient's medications, allergies, past medical, surgical, social and family histories were reviewed and updated as appropriate. Review of Systems   Constitutional: Negative for fever. HENT: Negative for facial swelling and rhinorrhea. Respiratory: Negative for cough and shortness of breath. Cardiovascular: Negative for chest pain. Gastrointestinal: Negative for nausea. Musculoskeletal: Positive for arthralgias, gait problem and joint swelling. Negative for back pain, myalgias, neck pain and neck stiffness. Skin: Negative for pallor and rash. Neurological: Negative for facial asymmetry and speech difficulty. Psychiatric/Behavioral: Negative for agitation and confusion. Examination:  General Exam:  Vitals: Pulse 77   Resp 16   Ht 5' 10.5\" (1.791 m)   Wt 275 lb (124.7 kg)   SpO2 95%   BMI 38.90 kg/m²    Physical Exam  Constitutional:       General: He is not in acute distress. Appearance: Normal appearance. He is obese. He is not ill-appearing. HENT:      Head: Normocephalic and atraumatic. Nose: No rhinorrhea. Eyes:      General: No scleral icterus. Right eye: No discharge. Left eye: No discharge. Cardiovascular:      Pulses: Normal pulses. Pulmonary:      Effort: Pulmonary effort is normal. No respiratory distress. Breath sounds: No stridor. Musculoskeletal:      Cervical back: Normal range of motion. Left knee: Swelling and crepitus present. No deformity, effusion, erythema, ecchymosis or lacerations. Decreased range of motion. Tenderness present over the medial joint line. No LCL laxity, MCL laxity, ACL laxity or PCL laxity. Abnormal meniscus. Normal alignment and normal patellar mobility. Normal pulse. Comments: Left Lower Extremity:    There is mild diffuse tenderness to palpation throughout the knee maximally along the medial joint line. There is mild global swelling anteriorly at the knee with no obvious effusion. There is 5 out of 5 strength with knee flexion and extension. Sensation is intact throughout the lower extremity. There is full range of motion at the knee with discomfort at the extremes of motion, especially terminal flexion. No instability with varus or valgus stress testing or anterior/posterior drawer testing. Medial Beverly's test produces mild pain but no palpable click. Skin is intact. Normal knee alignment. Pulses intact. Skin:     General: Skin is warm and dry. Coloration: Skin is not jaundiced or pale. Neurological:      General: No focal deficit present. Mental Status: He is alert and oriented to person, place, and time.    Psychiatric:         Mood and Affect: Mood normal.

## 2022-04-19 ENCOUNTER — OFFICE VISIT MH/BH (OUTPATIENT)
Dept: BARIATRICS/WEIGHT MGMT | Age: 62
End: 2022-04-19
Payer: COMMERCIAL

## 2022-04-19 VITALS
SYSTOLIC BLOOD PRESSURE: 136 MMHG | HEART RATE: 77 BPM | WEIGHT: 276.6 LBS | HEIGHT: 71 IN | DIASTOLIC BLOOD PRESSURE: 88 MMHG | OXYGEN SATURATION: 95 % | BODY MASS INDEX: 38.72 KG/M2

## 2022-04-19 DIAGNOSIS — Z79.899 MEDICATION MANAGEMENT: Primary | ICD-10-CM

## 2022-04-19 DIAGNOSIS — E66.9 OBESITY (BMI 30-39.9): ICD-10-CM

## 2022-04-19 PROCEDURE — 3017F COLORECTAL CA SCREEN DOC REV: CPT | Performed by: NURSE PRACTITIONER

## 2022-04-19 PROCEDURE — 1036F TOBACCO NON-USER: CPT | Performed by: NURSE PRACTITIONER

## 2022-04-19 PROCEDURE — 99213 OFFICE O/P EST LOW 20 MIN: CPT | Performed by: NURSE PRACTITIONER

## 2022-04-19 PROCEDURE — G8427 DOCREV CUR MEDS BY ELIG CLIN: HCPCS | Performed by: NURSE PRACTITIONER

## 2022-04-19 PROCEDURE — G8417 CALC BMI ABV UP PARAM F/U: HCPCS | Performed by: NURSE PRACTITIONER

## 2022-04-19 RX ORDER — PHENTERMINE HYDROCHLORIDE 37.5 MG/1
37.5 TABLET ORAL
Qty: 30 TABLET | Refills: 0 | Status: SHIPPED | OUTPATIENT
Start: 2022-04-19 | End: 2022-05-19

## 2022-04-19 ASSESSMENT — ENCOUNTER SYMPTOMS
EYES NEGATIVE: 1
ALLERGIC/IMMUNOLOGIC NEGATIVE: 1

## 2022-04-19 ASSESSMENT — PATIENT HEALTH QUESTIONNAIRE - PHQ9
SUM OF ALL RESPONSES TO PHQ QUESTIONS 1-9: 0
1. LITTLE INTEREST OR PLEASURE IN DOING THINGS: 0
SUM OF ALL RESPONSES TO PHQ9 QUESTIONS 1 & 2: 0
SUM OF ALL RESPONSES TO PHQ QUESTIONS 1-9: 0
SUM OF ALL RESPONSES TO PHQ QUESTIONS 1-9: 0
2. FEELING DOWN, DEPRESSED OR HOPELESS: 0
SUM OF ALL RESPONSES TO PHQ QUESTIONS 1-9: 0
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
## 2022-05-03 ENCOUNTER — HOSPITAL ENCOUNTER (OUTPATIENT)
Dept: OPERATING ROOM | Age: 62
Setting detail: OUTPATIENT SURGERY
Discharge: HOME | End: 2022-05-03

## 2022-05-03 ENCOUNTER — HOSPITAL ENCOUNTER (OUTPATIENT)
Dept: SURGERY | Age: 62
Discharge: HOME | End: 2022-05-03
Payer: COMMERCIAL

## 2022-05-03 PROCEDURE — 91200 LIVER ELASTOGRAPHY: CPT | Performed by: SPECIALIST

## 2022-05-03 PROCEDURE — 91200 LIVER ELASTOGRAPHY: CPT

## 2022-05-03 NOTE — PROCEDURES
FIBROSCAN  REPORT     Patient Name: Tabitha Cyr  : 1960    Date: 22      MRN: [id number]    Physician: No att. providers found    Ordering Physician: Arnoldo Woodall  Date of exam: 5/3/22      Technician: Edinson Montes De Oca                  Probe used:    [] M   [x] XL      Indication: (select suspected liver diagnosis)   [x] NAFLD  (K76.0)  [] Chronic Hepatitis C (B18.2) [] Chronic Hepatitis B (B18.1)   [] Alcohol-related Liver Disease (K70.9)  [] PBC (K74.3)  [] Sclerosing Cholangitis (K83.0)   [] Other:______________________    Recent labs: AST 63  Bilirubin N  Alk. Phosphatase N Platelet Count 574J  Date of labs: 3/3/22        Procedure: After providing oral explanation of the Fibroscan VCTE test procedure to the patient, the patient was placed in the supine position with the right arm in maximum abduction to allow exposure of the right lateral abdomen. The patient was briefly assessed, identifying the terminus of the xiphoid process and locating an ideal testing site, mid-line and lateral to this point. The patient was instructed to breath normally and remain stationary during the testing process. Pre-measurement data confirmed that the probe was centered over the liver parenchyma. A series of at least ten 50 Hz mechanical pulses were applied with controlled application pressure to induce a mechanical shear wave in the liver tissue. For each measurement, the shear wave propagation speed was detected, displayed and converted to its equivalent liver stiffness value measured in kilopascals (kPa). Skin-to-liver capsule distance and shear wave characteristics were monitored during the entire exam to assure data quality. Median liver stiffness measurement and interquartile range were calculated and displayed in real time. Acquired measurement data was stored and submitted to the provider for review and interpretation.  The patient tolerated the procedure well and was discharged without incident. FINDINGS:  A. FIBROSIS ASSSESSMENT:   MEDIAN LIVER STIFFNESS SCORE_____8.2__________kPa   INTERQUARTILE RANGE (IQR) /MEDIAN _____7____%   INTERPRETATION: Taking into account the patient's history, this liver stiffness     score is consistent with:        []  NO OR MINIMAL FIBROSIS (F0-F1)                   [x]  MODERATE FIBROSIS (F2)                   []  ADVANCED / SIGNIFICANT FIBROSIS (F3)                   []  CIRRHOSIS (F4)  B. LIVER FAT ESTIMATION:       MEDIAN CAP (controlled attenuation parameter)_____342____ dB/m  IRQ of  _37____ % INTERPRETATION: Taking into account the patient's history, this CAP score is     consistent with:        [] NO STEATOSIS (S0)        [] MINIMAL-MILD STEATOSIS (S0-S1)        [] MILD- MODERATE STEATOSIS (S1-S2)        [x] SIGNIFICANT STEATOSIS (S3)     COMMENTS: THE ABOVE FINDINGS SUGGEST  SIGNIFICANT STEATOSIS. THE kPa of 8.2 SUGGESTS F2 FIBROSIS HOWEVER THE FIB-4 INDEX OF 3.48 SUGGESTS ADVANCED FIBROSIS AND POSSIBLY CIRRHOSIS. IT IS NOTED THAT THE PATIENT HAS HAD SIGNIFICANT THROMBOCYTOPENIA SINCE 2011 WITHOUT OTHER EVIDENCE OF CIRRHOSIS (SUCH AS A NODULAR LIVER CONTOUR OR AST BEING > ALT). CONSIDERATION SHOULD BE GIVEN TO ANOTHER ETIOLOGY FOR THE THROMBOCYTOPENIA OTHER THAN LIVER DISEASE. SIGNATURE OF INTERPRETING PROVIDER: Electronically signed by Karel Hill MD on 5/3/2022 at 2:31 PM    My interpretation of this Vibration Controlled Transient Elastogtaphy (VCTE) was developed after careful consideration of the patient's current medical evidence. Any and all Fibroscan studies must be carefully evaluated, taking fully into account all individual measurements/scans, patient history, and other factors.  Any further medical or surgical intervention should be made only while fully considering the circumstances of this patient, in consultation with this patient, fully informed by the product characteristics of any drugs or treatment protocols.

## 2022-05-08 ENCOUNTER — PATIENT MESSAGE (OUTPATIENT)
Dept: INTERNAL MEDICINE CLINIC | Age: 62
End: 2022-05-08

## 2022-05-08 DIAGNOSIS — R17 ELEVATED BILIRUBIN: ICD-10-CM

## 2022-05-08 DIAGNOSIS — R79.89 HIGH SERUM TRANSFERRIN SATURATION: ICD-10-CM

## 2022-05-08 DIAGNOSIS — R79.89 ELEVATED FERRITIN: ICD-10-CM

## 2022-05-08 DIAGNOSIS — R74.01 TRANSAMINITIS: Primary | ICD-10-CM

## 2022-05-09 NOTE — TELEPHONE ENCOUNTER
From: Josh Parker  To: Dr. Bob Contreras  Sent: 5/8/2022 8:55 PM EDT  Subject: My Fibro Scan Results    Below are notes from Mary Hart concerning my Fibro Scan. I am supposed to have more blood work May 11 at 9:30. Should my blood work orders be updated to reflect needed tests from these results? Do I need a Hematology referral? What are my next steps here? Just a note. Spencer Sprague I've been working with [facility] and taking a medication for weight loss and with diet and the meds, have lost over 20 lbs so far. COMMENTS: THE ABOVE FINDINGS SUGGEST  SIGNIFICANT STEATOSIS. THE kPa of 8.2 SUGGESTS F2 FIBROSIS HOWEVER THE FIB-4 INDEX OF 3.48 SUGGESTS ADVANCED FIBROSIS AND POSSIBLY CIRRHOSIS.     IT IS NOTED THAT THE PATIENT HAS HAD SIGNIFICANT THROMBOCYTOPENIA SINCE 2011 WITHOUT OTHER EVIDENCE OF CIRRHOSIS (SUCH AS A NODULAR LIVER CONTOUR OR AST BEING > ALT). CONSIDERATION SHOULD BE GIVEN TO ANOTHER ETIOLOGY FOR THE THROMBOCYTOPENIA OTHER THAN LIVER DISEASE.  May need to consider Hematology referral. Thank you,   Jalil Vega

## 2022-05-11 ENCOUNTER — HOSPITAL ENCOUNTER (OUTPATIENT)
Age: 62
Discharge: HOME | End: 2022-05-11
Payer: COMMERCIAL

## 2022-05-11 LAB
ALBUMIN SERPL-MCNC: 4.5 GM/DL (ref 3.4–5)
ALBUMIN SERPL-MCNC: 4.5 GM/DL (ref 3.4–5)
ALP BLD-CCNC: 63 IU/L (ref 40–129)
ALT SERPL-CCNC: 58 U/L (ref 10–40)
ANION GAP SERPL CALCULATED.3IONS-SCNC: 10 MMOL/L (ref 4–16)
AST SERPL-CCNC: 49 IU/L (ref 15–37)
BASOPHILS ABSOLUTE: 0 K/CU MM
BASOPHILS RELATIVE PERCENT: 0.6 % (ref 0–1)
BILIRUB SERPL-MCNC: 2 MG/DL (ref 0–1)
BILIRUBIN DIRECT: 0.4 MG/DL (ref 0–0.3)
BILIRUBIN, INDIRECT: 1.6 MG/DL (ref 0–0.7)
BUN BLDV-MCNC: 22 MG/DL (ref 6–23)
CALCIUM SERPL-MCNC: 9.3 MG/DL (ref 8.3–10.6)
CHLORIDE BLD-SCNC: 105 MMOL/L (ref 99–110)
CO2: 25 MMOL/L (ref 21–32)
CREAT SERPL-MCNC: 1 MG/DL (ref 0.9–1.3)
DIFFERENTIAL TYPE: ABNORMAL
EOSINOPHILS ABSOLUTE: 0.1 K/CU MM
EOSINOPHILS RELATIVE PERCENT: 2.4 % (ref 0–3)
FERRITIN: 1273 NG/ML (ref 30–400)
GFR AFRICAN AMERICAN: >60 ML/MIN/1.73M2
GFR NON-AFRICAN AMERICAN: >60 ML/MIN/1.73M2
GLUCOSE BLD-MCNC: 84 MG/DL (ref 70–99)
HCT VFR BLD CALC: 44.6 % (ref 42–52)
HEMOGLOBIN: 15.5 GM/DL (ref 13.5–18)
IMMATURE NEUTROPHIL %: 0.2 % (ref 0–0.43)
IRON: 138 UG/DL (ref 59–158)
LYMPHOCYTES ABSOLUTE: 1.6 K/CU MM
LYMPHOCYTES RELATIVE PERCENT: 32.2 % (ref 24–44)
MCH RBC QN AUTO: 33.6 PG (ref 27–31)
MCHC RBC AUTO-ENTMCNC: 34.8 % (ref 32–36)
MCV RBC AUTO: 96.7 FL (ref 78–100)
MONOCYTES ABSOLUTE: 0.4 K/CU MM
MONOCYTES RELATIVE PERCENT: 7.9 % (ref 0–4)
PCT TRANSFERRIN: 51 % (ref 10–44)
PDW BLD-RTO: 12.5 % (ref 11.7–14.9)
PHOSPHORUS: 3.4 MG/DL (ref 2.5–4.9)
PLATELET # BLD: 120 K/CU MM (ref 140–440)
PMV BLD AUTO: 11.8 FL (ref 7.5–11.1)
POTASSIUM SERPL-SCNC: 3.9 MMOL/L (ref 3.5–5.1)
RBC # BLD: 4.61 M/CU MM (ref 4.6–6.2)
SEGMENTED NEUTROPHILS ABSOLUTE COUNT: 2.8 K/CU MM
SEGMENTED NEUTROPHILS RELATIVE PERCENT: 56.7 % (ref 36–66)
SODIUM BLD-SCNC: 140 MMOL/L (ref 135–145)
TOTAL IMMATURE NEUTOROPHIL: 0.01 K/CU MM
TOTAL IRON BINDING CAPACITY: 271 UG/DL (ref 250–450)
TOTAL PROTEIN: 6.8 GM/DL (ref 6.4–8.2)
UNSATURATED IRON BINDING CAPACITY: 133 UG/DL (ref 110–370)
WBC # BLD: 4.9 K/CU MM (ref 4–10.5)

## 2022-05-11 PROCEDURE — 82248 BILIRUBIN DIRECT: CPT

## 2022-05-11 PROCEDURE — 85025 COMPLETE CBC W/AUTO DIFF WBC: CPT

## 2022-05-11 PROCEDURE — 82728 ASSAY OF FERRITIN: CPT

## 2022-05-11 PROCEDURE — 83550 IRON BINDING TEST: CPT

## 2022-05-11 PROCEDURE — 83540 ASSAY OF IRON: CPT

## 2022-05-11 PROCEDURE — 84100 ASSAY OF PHOSPHORUS: CPT

## 2022-05-11 PROCEDURE — 36415 COLL VENOUS BLD VENIPUNCTURE: CPT

## 2022-05-11 PROCEDURE — 80053 COMPREHEN METABOLIC PANEL: CPT

## 2022-05-18 ENCOUNTER — TELEPHONE (OUTPATIENT)
Dept: GASTROENTEROLOGY | Age: 62
End: 2022-05-18

## 2022-05-18 ENCOUNTER — OFFICE VISIT (OUTPATIENT)
Dept: INTERNAL MEDICINE CLINIC | Age: 62
End: 2022-05-18
Payer: COMMERCIAL

## 2022-05-18 ENCOUNTER — OFFICE VISIT (OUTPATIENT)
Dept: BARIATRICS/WEIGHT MGMT | Age: 62
End: 2022-05-18
Payer: COMMERCIAL

## 2022-05-18 VITALS
OXYGEN SATURATION: 96 % | BODY MASS INDEX: 35.64 KG/M2 | HEART RATE: 72 BPM | WEIGHT: 254.6 LBS | DIASTOLIC BLOOD PRESSURE: 76 MMHG | SYSTOLIC BLOOD PRESSURE: 120 MMHG | HEIGHT: 71 IN

## 2022-05-18 VITALS
OXYGEN SATURATION: 97 % | HEIGHT: 71 IN | WEIGHT: 255.4 LBS | BODY MASS INDEX: 35.76 KG/M2 | DIASTOLIC BLOOD PRESSURE: 78 MMHG | SYSTOLIC BLOOD PRESSURE: 122 MMHG | HEART RATE: 70 BPM

## 2022-05-18 DIAGNOSIS — R74.01 TRANSAMINITIS: Primary | ICD-10-CM

## 2022-05-18 DIAGNOSIS — E66.9 OBESITY (BMI 30-39.9): ICD-10-CM

## 2022-05-18 DIAGNOSIS — Z79.899 MEDICATION MANAGEMENT: Primary | ICD-10-CM

## 2022-05-18 DIAGNOSIS — R79.89 HIGH SERUM FERRITIN: ICD-10-CM

## 2022-05-18 DIAGNOSIS — D69.6 THROMBOCYTOPENIA (HCC): ICD-10-CM

## 2022-05-18 DIAGNOSIS — R79.89 HIGH SERUM TRANSFERRIN SATURATION: ICD-10-CM

## 2022-05-18 PROCEDURE — 99215 OFFICE O/P EST HI 40 MIN: CPT | Performed by: INTERNAL MEDICINE

## 2022-05-18 PROCEDURE — G8417 CALC BMI ABV UP PARAM F/U: HCPCS | Performed by: NURSE PRACTITIONER

## 2022-05-18 PROCEDURE — G8427 DOCREV CUR MEDS BY ELIG CLIN: HCPCS | Performed by: NURSE PRACTITIONER

## 2022-05-18 PROCEDURE — G8427 DOCREV CUR MEDS BY ELIG CLIN: HCPCS | Performed by: INTERNAL MEDICINE

## 2022-05-18 PROCEDURE — 1036F TOBACCO NON-USER: CPT | Performed by: INTERNAL MEDICINE

## 2022-05-18 PROCEDURE — 1036F TOBACCO NON-USER: CPT | Performed by: NURSE PRACTITIONER

## 2022-05-18 PROCEDURE — 3017F COLORECTAL CA SCREEN DOC REV: CPT | Performed by: NURSE PRACTITIONER

## 2022-05-18 PROCEDURE — G8417 CALC BMI ABV UP PARAM F/U: HCPCS | Performed by: INTERNAL MEDICINE

## 2022-05-18 PROCEDURE — 3017F COLORECTAL CA SCREEN DOC REV: CPT | Performed by: INTERNAL MEDICINE

## 2022-05-18 PROCEDURE — 99213 OFFICE O/P EST LOW 20 MIN: CPT | Performed by: NURSE PRACTITIONER

## 2022-05-18 RX ORDER — PHENTERMINE HYDROCHLORIDE 37.5 MG/1
37.5 TABLET ORAL
Qty: 30 TABLET | Refills: 0 | Status: SHIPPED | OUTPATIENT
Start: 2022-05-18 | End: 2022-06-17

## 2022-05-18 RX ORDER — PHENTERMINE HYDROCHLORIDE 37.5 MG/1
37.5 TABLET ORAL
Qty: 30 TABLET | Refills: 0 | Status: CANCELLED | OUTPATIENT
Start: 2022-05-18 | End: 2022-06-17

## 2022-05-18 ASSESSMENT — PATIENT HEALTH QUESTIONNAIRE - PHQ9
2. FEELING DOWN, DEPRESSED OR HOPELESS: 0
SUM OF ALL RESPONSES TO PHQ QUESTIONS 1-9: 0
SUM OF ALL RESPONSES TO PHQ QUESTIONS 1-9: 0
SUM OF ALL RESPONSES TO PHQ9 QUESTIONS 1 & 2: 0
SUM OF ALL RESPONSES TO PHQ QUESTIONS 1-9: 0
1. LITTLE INTEREST OR PLEASURE IN DOING THINGS: 0
SUM OF ALL RESPONSES TO PHQ QUESTIONS 1-9: 0

## 2022-05-18 ASSESSMENT — ENCOUNTER SYMPTOMS
RESPIRATORY NEGATIVE: 1
EYES NEGATIVE: 1
ALLERGIC/IMMUNOLOGIC NEGATIVE: 1

## 2022-05-18 NOTE — TELEPHONE ENCOUNTER
fibroscan results. COMMENTS: THE ABOVE FINDINGS SUGGEST  SIGNIFICANT STEATOSIS. THE kPa of 8.2 SUGGESTS F2 FIBROSIS HOWEVER THE FIB-4 INDEX OF 3.48 SUGGESTS ADVANCED FIBROSIS AND POSSIBLY CIRRHOSIS.        Per Dr. Hao Degroot 2011 WITHOUT OTHER EVIDENCE OF CIRRHOSIS (SUCH AS A NODULAR LIVER CONTOUR OR AST BEING > ALT). CONSIDERATION SHOULD BE GIVEN TO ANOTHER ETIOLOGY FOR THE THROMBOCYTOPENIA OTHER THAN LIVER DISEASE.       Recommend Hematology evaluation for thrombocytopenia.

## 2022-05-18 NOTE — PROGRESS NOTES
5/18/22    Carrillo [place]  1960    Chief Complaint   Patient presents with    3 Month Follow-Up     pt. here today for 3 month f/u and has recent lab results to review. Pt. also had fibroscan done,  results being sent here.  Other     pt. also was having leg cramps, started taking otc potassium 99mg 2 tabs QAM and since then leg cramps have stopped. History of Present Illness:  Carrillo Brown is a 64 y.o. pleasant gentleman presenting today with a chief complaint of transaminitis, high Tsat/Ferritin, TCP. He has a past medical history significant for:  HL (,  on 5/18/2021), on Omega-3   NAFLD  Cough-variant asthma, on Albuterol inh PRN   STEVEN s/p UPPP, on CPAP QHS  OA  Depression, off Celexa    Obesity (BMI 36)   S/p umbilical hernia repair (Dr. Ninfa Nogueira)   Never smoker   Occasional alcohol use   Supplements: Cinnamon, Chondroitin, Zinc, K gluconate     # BP today 122/78. He is not on anti-hypertensive. # Sees Dr. Lin Otoole for STEVEN. Uses a CPAP. # Colonoscopy in 2014 with 2 polyps. Repeat in 2018 recommended 10-year FU. Had FIT test in 2021. # Seeing GI for elevated ALT, AST, Tbili. Fibroscan with possible cirrhosis. Has seen Ophtho Dr. Russ Thompson for possible Kayser-Fleischer which was unremarkable. Autoimmune, hepatitis, CK unremarkable. Aldolase slightly elevated. Ferritin and Tsat high. UDS neg. Ethanol below detection limit. A1AT WNL. Ceruloplasmin WNL. Has thrombocytopenia but noted to be prior to having any liver issues. TCP since 2011. CT A/P in 2018 with only hepatic steatosis. Only once in 2021 he had macrocytosis, otherwise MCV has been normal.only once in 2011 he had leukopenia, otherwise RBC and WBC counts have been WNL. Has not seen Hematology. No known FHx of liver disease. Paternal grandfather with leukemia (unknown type). # Getting Adipex from [facility]. OARRS reviewed.      Went to [facility] maintenance:   Health Maintenance Due   Topic Date Due    Pneumococcal 0-64 years Vaccine (1 - PCV) Never done    Colorectal Cancer Screen  09/29/2021         Review of Systems:  Constitutional: no fevers, no chills, no night sweats, no weight loss, no weight gain, no fatigue   Pain assessment: OA pains  Head: no headaches  Ears: no hearing loss, no tinnitus, no vertigo  Eyes: no blurry vision, no diplopia, no dryness, no itchiness  Mouth: no oral ulcers, no dry mouth, no sore throat  Nose: no nasal congestion, no epistaxis  Cardiac: no chest pain, no palpitations, no leg swelling, no orthopnea, no PND, no syncope  Pulmonary: no dyspnea, no cough, no wheezing, no hemoptysis  GI: no nausea, no vomiting, no diarrhea, no constipation, no abdominal pain, no hematochezia  : no dysuria, no frequency, no urgency, no hematuria, no frothy urine  MSK: no Raynaud's   Neuro: no focal neurological deficits, no seizures  Sleep: no snoring, no daytime somnolence   Psych: no depression, no anxiety, no suicidal ideation      Physical Exam:  VITALS:   /78   Pulse 70   Ht 5' 10.5\" (1.791 m)   Wt 255 lb 6.4 oz (115.8 kg)   SpO2 97%   BMI 36.13 kg/m²     PHYSICAL EXAMINATION:  General: alert, awake, and oriented to time, place, person, and situation. Not in acute distress. ?flat affect  Skin:  no suspicious rashes, no jaundice  Head: normocephalic/atraumatic  Eyes: anicteric sclera, well-injected conjunctiva. Pupils are equally round and reactive to light.  Extraocular movements are intact   Nose: no septal deviation evident  Sinuses: no sinus tenderness  Ears: external ears normal  Neck: supple, no cervical lymphadenopathy, thyroid symmetric and not enlarged, no bruits   Heart: regular rate and rhythm, regular S1/S2, no S3/S4, no audible murmurs, no audible friction rub  Lungs: clear to auscultation bilaterally, no audible crackles, no audible wheezes, no audible rhonchi    Abdomen: normal bowel sounds, soft abdomen, non-tender, no palpable masses  Extremities: no edema, warm, no cyanosis, no clubbing. Good capillary refill   MSK: no tenderness across spinous processes, full ROM in all 4 extremities. No joint swelling or tenderness   Peripheral vascular: 2+ pulses symmetric (radial)  Neuro: gait normal, CN II-XII intact, motor power 5/5 in all 4 extremities, sensation intact and symmetric    Labs   I have personally reviewed labs, and discussed pertinent findings with patient on this date 5/18/2022     Imaging   I have personally reviewed imaging, and discussed pertinent findings with patient on this date 5/18/2022     Other notes  I have personally reviewed other notes, and discussed pertinent findings with patient on this date 5/18/2022       Assessment/Plan:     1. Transaminitis  Fibroscan with advanced fibrosis and suggestive possibly of cirrhosis  Work up thus far inconclusive as to etiology. ?NAFLD vs Hemochromatosis. May need liver biopsy   - Jimmie Sanders MD, Gastroenterology, [place]  - CBC with Auto Differential; Future  - Hepatic Function Panel; Future  - Renal Function Panel; Future  - Protime-INR; Future    2. High serum ferritin  High ferritin with Tsat > 45%. Will require work up for hemochromatosis  - Anaya Coulter MD, Hematology Oncology, [place]    3. High serum transferrin saturation  High ferritin with Tsat > 45%. Will require work up for hemochromatosis  - Anaya Coulter MD, Hematology Oncology, [place]    4. Thrombocytopenia ([address]  TCP even before he had liver disease (TCP since 2011, hepatic steatosis on CT A/P in 2018 -- may have had liver disease prior but no e/o cirrhosis at the time). Questionable cirrhosis now. Macrocytosis once last year ? 2/2 cirrhosis vs needs MDS rule out?  - Anaya Coulter MD, Hematology Oncology, [place]    On this date 5/18/2022 , I have spent 41 minutes reviewing previous notes and test results, as well as face-to-face time with the patient discussing the diagnosis and importance of compliance with the treatment plan. Care discussed with patient and questions answered. Patient verbalizes understanding and agrees with plan. Discussed with patient the importance of continuity of care. I encouraged patient to schedule next appointment within 6 weeks with me. Patient prefers to be reached by Phone call at [phone] for future medical correspondence. Encouraged to activate MyChart. I reviewed and reconciled the medications this visit. I reviewed and updated the past medical, surgical, social, and family history during this visit. After visit summary provided.        Yanira Collier MD  Internal Medicine  5/18/2022   10:26 AM
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
## 2022-06-06 ENCOUNTER — HOSPITAL ENCOUNTER (OUTPATIENT)
Dept: INFUSION THERAPY | Age: 62
Discharge: HOME | End: 2022-06-06
Payer: COMMERCIAL

## 2022-06-06 ENCOUNTER — INITIAL CONSULT (OUTPATIENT)
Dept: ONCOLOGY | Age: 62
End: 2022-06-06
Payer: COMMERCIAL

## 2022-06-06 ENCOUNTER — OFFICE VISIT (OUTPATIENT)
Dept: GASTROENTEROLOGY | Age: 62
End: 2022-06-06
Payer: COMMERCIAL

## 2022-06-06 VITALS
RESPIRATION RATE: 18 BRPM | OXYGEN SATURATION: 96 % | DIASTOLIC BLOOD PRESSURE: 84 MMHG | BODY MASS INDEX: 34.35 KG/M2 | SYSTOLIC BLOOD PRESSURE: 135 MMHG | HEIGHT: 71 IN | WEIGHT: 245.4 LBS | HEART RATE: 70 BPM | TEMPERATURE: 98.7 F

## 2022-06-06 VITALS
SYSTOLIC BLOOD PRESSURE: 124 MMHG | BODY MASS INDEX: 34.52 KG/M2 | HEART RATE: 76 BPM | TEMPERATURE: 96.9 F | HEIGHT: 71 IN | WEIGHT: 246.6 LBS | DIASTOLIC BLOOD PRESSURE: 90 MMHG | OXYGEN SATURATION: 98 %

## 2022-06-06 DIAGNOSIS — D69.6 THROMBOCYTOPENIA (HCC): ICD-10-CM

## 2022-06-06 DIAGNOSIS — K76.0 FATTY LIVER: ICD-10-CM

## 2022-06-06 DIAGNOSIS — R79.89 ELEVATED FERRITIN: Primary | ICD-10-CM

## 2022-06-06 DIAGNOSIS — R79.89 ELEVATED LFTS: ICD-10-CM

## 2022-06-06 DIAGNOSIS — R79.89 ELEVATED FERRITIN: ICD-10-CM

## 2022-06-06 DIAGNOSIS — R79.89 ELEVATED LFTS: Primary | ICD-10-CM

## 2022-06-06 LAB
BASOPHILS ABSOLUTE: 0 K/CU MM
BASOPHILS RELATIVE PERCENT: 0.2 % (ref 0–1)
DIFFERENTIAL TYPE: ABNORMAL
EOSINOPHILS ABSOLUTE: 0.1 K/CU MM
EOSINOPHILS RELATIVE PERCENT: 1.7 % (ref 0–3)
ERYTHROCYTE SEDIMENTATION RATE: 1 MM/HR (ref 0–20)
FERRITIN: 1144 NG/ML (ref 30–400)
HCT VFR BLD CALC: 43.1 % (ref 42–52)
HEMOGLOBIN: 15.3 GM/DL (ref 13.5–18)
IRON: 156 UG/DL (ref 59–158)
LACTATE DEHYDROGENASE: 225 IU/L (ref 120–246)
LYMPHOCYTES ABSOLUTE: 1.6 K/CU MM
LYMPHOCYTES RELATIVE PERCENT: 33.1 % (ref 24–44)
MCH RBC QN AUTO: 34.6 PG (ref 27–31)
MCHC RBC AUTO-ENTMCNC: 35.5 % (ref 32–36)
MCV RBC AUTO: 97.5 FL (ref 78–100)
MONOCYTES ABSOLUTE: 0.4 K/CU MM
MONOCYTES RELATIVE PERCENT: 7.8 % (ref 0–4)
PCT TRANSFERRIN: 55 % (ref 10–44)
PDW BLD-RTO: 13.7 % (ref 11.7–14.9)
PLATELET # BLD: 123 K/CU MM (ref 140–440)
PMV BLD AUTO: 10.5 FL (ref 7.5–11.1)
RBC # BLD: 4.42 M/CU MM (ref 4.6–6.2)
SEGMENTED NEUTROPHILS ABSOLUTE COUNT: 2.7 K/CU MM
SEGMENTED NEUTROPHILS RELATIVE PERCENT: 57.2 % (ref 36–66)
TOTAL IRON BINDING CAPACITY: 286 UG/DL (ref 250–450)
UNSATURATED IRON BINDING CAPACITY: 130 UG/DL (ref 110–370)
WBC # BLD: 4.7 K/CU MM (ref 4–10.5)

## 2022-06-06 PROCEDURE — G8417 CALC BMI ABV UP PARAM F/U: HCPCS | Performed by: INTERNAL MEDICINE

## 2022-06-06 PROCEDURE — 3017F COLORECTAL CA SCREEN DOC REV: CPT | Performed by: INTERNAL MEDICINE

## 2022-06-06 PROCEDURE — 36415 COLL VENOUS BLD VENIPUNCTURE: CPT

## 2022-06-06 PROCEDURE — 83615 LACTATE (LD) (LDH) ENZYME: CPT

## 2022-06-06 PROCEDURE — 83550 IRON BINDING TEST: CPT

## 2022-06-06 PROCEDURE — 1036F TOBACCO NON-USER: CPT | Performed by: NURSE PRACTITIONER

## 2022-06-06 PROCEDURE — G8427 DOCREV CUR MEDS BY ELIG CLIN: HCPCS | Performed by: NURSE PRACTITIONER

## 2022-06-06 PROCEDURE — 99205 OFFICE O/P NEW HI 60 MIN: CPT | Performed by: INTERNAL MEDICINE

## 2022-06-06 PROCEDURE — 82746 ASSAY OF FOLIC ACID SERUM: CPT

## 2022-06-06 PROCEDURE — 82607 VITAMIN B-12: CPT

## 2022-06-06 PROCEDURE — 85025 COMPLETE CBC W/AUTO DIFF WBC: CPT

## 2022-06-06 PROCEDURE — 82728 ASSAY OF FERRITIN: CPT

## 2022-06-06 PROCEDURE — G8417 CALC BMI ABV UP PARAM F/U: HCPCS | Performed by: NURSE PRACTITIONER

## 2022-06-06 PROCEDURE — 81256 HFE GENE: CPT

## 2022-06-06 PROCEDURE — 83540 ASSAY OF IRON: CPT

## 2022-06-06 PROCEDURE — 3017F COLORECTAL CA SCREEN DOC REV: CPT | Performed by: NURSE PRACTITIONER

## 2022-06-06 PROCEDURE — 1036F TOBACCO NON-USER: CPT | Performed by: INTERNAL MEDICINE

## 2022-06-06 PROCEDURE — 85652 RBC SED RATE AUTOMATED: CPT

## 2022-06-06 PROCEDURE — 99213 OFFICE O/P EST LOW 20 MIN: CPT | Performed by: NURSE PRACTITIONER

## 2022-06-06 PROCEDURE — G8427 DOCREV CUR MEDS BY ELIG CLIN: HCPCS | Performed by: INTERNAL MEDICINE

## 2022-06-06 NOTE — PROGRESS NOTES
Patient Name:  Clover Trejo  Patient :  1960  Patient MRN:  [id number]     Primary Oncologist: Jason Silva MD  Referring Provider: Hayden Power MD     Date of Service: 2022      Reason for Consult: To evaluate the patient with high serum ferritin level with elevated transferrin saturation and mild thrombocytopenia. Chief Complaint:    Chief Complaint   Patient presents with    New Patient     Patient Active Problem List:     Obesity, Class II, BMI 35-39.9, with comorbidity (HCC)     Chronic right shoulder pain     STEVEN on CPAP     Total bilirubin, elevated     Fatigue     Major depressive disorder, recurrent episode, mild (HCC)     Erectile dysfunction     Colon polyps     RAD (reactive airway disease)     Impaired fasting glucose     Hepatic steatosis- seen on CT abdomen     Diverticulosis of large intestine- seen on CT abdomen     Hypersomnia     Primary osteoarthritis of right knee     Cough variant asthma    HPI:   Clover Trejo is a 66-year-old very pleasant gentleman with medical history history significant for hyperlipidemia, depression, obstructive sleep apnea, obesity, fatty liver and osteoarthritis, referred to me on 2022 for evaluation of elevated serum ferritin level with high transferrin saturation and mild thrombocytopenia. He has been having elevated liver enzymes and has been followed by gastroenterology. He recently established care as a new patient with Dr. Milly Elam. Viral hepatitis panels done on 10/18/21 and 3/3/22 were negative. JENNIFER, F-actin IgG, tissue transglut Ab, transglutaminase IgA were negative. A1AT and ceruloplasmin were within normal range. CT abdomen/pelvis in 2018 showed fatty liver. US abdomen on 22 showed hepatitis steatosis. Fibroscan done on 22 showed advanced fibrosis and possibly cirrhosis. Iron panel on 10/18/21, 3/3/22 and 22 showed persistently elevated serum ferritin level and transferrin saturation.      He also has mild thrombocytopenia since 2011. He doesn't have anemia or neutropenia. He stopped drinking alcohol since 3/2022. Before that, he used to drink alcohol almost daily since he was 18. He was referred to me for further evaluation. Past Medical History:     Significant for  1. Hyperlipidemia  2. Depression  3. Obstructive sleep apnea  4. Obesity  5. Fatty liver  6. Osteoarthritis    Past Surgery History:    Significant for  1. Umbilical hernia repair in 4/18/2018  2. Throat surgery in 2010  3. Lipoma resection in 2011    Social History:   He denies smoking or illicit drug abuse. He stopped drinking alcohol since March 2022. He used to drink alcohol almost daily before since he was 18. Family History:    Significant for breast cancer in his maternal aunt, cervical cancer and his mother, cancer in his paternal grandfather and in his father. No family history of hemochromatosis. No Known Allergies    Current Outpatient Medications on File Prior to Visit   Medication Sig Dispense Refill    Ascorbic Acid (VITAMIN C PO) Take by mouth      Potassium 99 MG TABS Take by mouth 2 tabs in am      phentermine (ADIPEX-P) 37.5 MG tablet Take 1 tablet by mouth every morning (before breakfast) for 30 days. 1/2 tab daily x3-5 days and then full tab. BMI 30 tablet 0    meloxicam (MOBIC) 7.5 MG tablet Take 1 tablet by mouth daily as needed for Pain 30 tablet 1    Cinnamon 500 MG CAPS Take by mouth daily      Misc Natural Products (GLUCOSAMINE CHOND COMPLEX/MSM PO) Take by mouth      fish oil-omega-3 fatty acids 1000 MG capsule Take 2 g by mouth daily. (Takes 3-4 per day)       No current facility-administered medications on file prior to visit. Review of Systems:  Constitutional:  No weight loss, No fever, No chills, No night sweats. Energy level good. Eyes:  No diplopia, No transient or permanent loss of vision, No scotomata.   ENT / Mouth:  No epistaxis, No dysphagia, No hoarseness, No oral S1 and S2, no murmur noted  ABDOMEN: normal bowel sounds x 4, soft, non-distended, non-tender, no masses palpated, no hepatosplenomegaly   MUSCULOSKELETAL: full range of motion noted, tone is normal  NEUROLOGIC: awake, alert, oriented to name, place and time. Motor skills grossly intact. SKIN: appears intact, normal skin color, normal texture, normal turgor, no jaundice.    EXTREMITIES: no LE edema, no leg swelling, no cyanosis, no clubbing,       Labs:  Hematology:  Lab Results   Component Value Date    WBC 4.9 05/11/2022    RBC 4.61 05/11/2022    HGB 15.5 05/11/2022    HCT 44.6 05/11/2022    MCV 96.7 05/11/2022    MCH 33.6 (H) 05/11/2022    MCHC 34.8 05/11/2022    RDW 12.5 05/11/2022     (L) 05/11/2022    MPV 11.8 (H) 05/11/2022    SEGSPCT 56.7 05/11/2022    EOSRELPCT 2.4 05/11/2022    BASOPCT 0.6 05/11/2022    LYMPHOPCT 32.2 05/11/2022    MONOPCT 7.9 (H) 05/11/2022    SEGSABS 2.8 05/11/2022    EOSABS 0.1 05/11/2022    BASOSABS 0.0 05/11/2022    LYMPHSABS 1.6 05/11/2022    MONOSABS 0.4 05/11/2022    DIFFTYPE AUTOMATED DIFFERENTIAL 05/11/2022     No results found for: ESR  Chemistry:  Lab Results   Component Value Date     05/11/2022    K 3.9 05/11/2022     05/11/2022    CO2 25 05/11/2022    BUN 22 05/11/2022    CREATININE 1.0 05/11/2022    GLUCOSE 84 05/11/2022    CALCIUM 9.3 05/11/2022    PROT 6.8 05/11/2022    LABALBU 4.5 05/11/2022    LABALBU 4.5 05/11/2022    BILITOT 2.0 (H) 05/11/2022    ALKPHOS 63 05/11/2022    AST 49 (H) 05/11/2022    ALT 58 (H) 05/11/2022    LABGLOM >60 05/11/2022    GFRAA >60 05/11/2022    AGRATIO 2.0 09/16/2021    GLOB 2.2 09/16/2021    PHOS 3.4 05/11/2022     No results found for: MMA, LDH, HOMOCYSTEINE  No components found for: LD  Lab Results   Component Value Date    TSHHS 3.570 02/18/2022    T4FREE 1.13 02/18/2022     Immunology:  Lab Results   Component Value Date    PROT 6.8 05/11/2022     No results found for: Burnside Holley, KLFLCR  No results found for: B2M  Coagulation Panel:  Lab Results   Component Value Date    PROTIME 12.4 03/03/2022    INR 1.00 03/03/2022    APTT 29.8 03/03/2022     Anemia Panel:  No results found for: Bryce Husam, FOLATE  Tumor Markers:  Lab Results   Component Value Date    PSA 0.88 10/20/2011        Observations:  No data recorded     Assessment   Elevated serum ferritin level and transferrin saturation  Thrombocytopenia    Plan:  Michelle Redding is a 70-year-old very pleasant gentleman who was found to have elevated liver enzymes since 9/2021. Viral hepatitis and auto immune hepatitis panel and stewart disease were ruled out already. CT abdomen/pelvis in 2018 and US abdomen in 2/18/22 showed hepatitis steatosis. Fibroscan done on 5/18/22 showed advanced fibrosis and possibly cirrhosis. Iron panel on 10/18/21, 3/3/22 and 5/11/22 showed persistently elevated serum ferritin level and transferrin saturation. His elevated serum ferritin and transferrin saturation can be due to underlying chronic liver disease. However, I agree with Dr. Emilia Richardson and I recommend to send hemochromatosis DNA panel to r/o hereditary hemochromatosis since he has persistently elevated transferrin saturation. If he is found to have hereditary hemochromatosis, I will start therapeutic phlebotomy. He has mild thrombocytopenia since 2011. He doesn't have anemia or neutropenia. JENNIFER and hepatitis panel were negative. Will check B12 and folate. I believe his thrombocytopenia is due to either underlying chronic liver disease or chronic ITP. MDS is still in differential but less likely in view of his normal hemoglobin and neutrophil count. I answered all his questions and concerns for today. I asked him to follow up with primary care physician on regular basis. I will continue to keep you updated on his progress. Thank you for allowing me to participate in the care of this very pleasant patient.   Recent imaging and labs were reviewed and discussed with the patient.

## 2022-06-06 NOTE — PATIENT INSTRUCTIONS
Patient Education        Nonalcoholic Steatohepatitis (ALVARADO): Care Instructions  Overview     Nonalcoholic steatohepatitis (ALVARADO) is liver inflammation. It is caused by a buildup of fat in the liver. The fat buildup is not caused by drinking alcohol. Because of the inflammation, the liver does not work as well as it should. ALVARADO is part of a group of liver diseases called nonalcoholic fatty liver disease. In these diseases, fat builds up in the liver and sometimes causesliver damage. This damage can get worse over time. Follow-up care is a key part of your treatment and safety. Be sure to make and go to all appointments, and call your doctor if you are having problems. It's also a good idea to know your test results and keep alist of the medicines you take. How can you care for yourself at home?  Stay at a healthy weight. Or if you need to, slowly get to a healthy weight.  Control your cholesterol. Talk to your doctor about ways to lower your cholesterol, if needed. You might try getting active, taking medicines, and making healthy changes to your diet.  Eat healthy foods. This includes fruits, vegetables, lean meats and dairy, and whole grains.  If you have diabetes, keep your blood sugar in your target range.  Get at least 30 minutes of exercise on most days of the week. Walking is a good choice.  Limit alcohol, or do not drink. Alcohol can damage the liver and cause health problems.  Get immunized. Having ALVARADO increases your risk for infections, so it's important to get all recommended vaccines. When should you call for help? Call 911 anytime you think you may need emergency care. For example, call if:     You have trouble breathing.      You vomit blood or what looks like coffee grounds.    Call your doctor now or seek immediate medical care if:     You feel very sleepy or confused.      You have new or worse belly pain.      You have a fever.      There is a new or increasing yellow tint to your skin or the whites of your eyes.      You have any abnormal bleeding, such as:  ? Nosebleeds. ? Vaginal bleeding that is different (heavier, more frequent, at a different time of the month) than what you are used to.  ? Bloody or black stools, or rectal bleeding. ? Bloody or pink urine. Watch closely for changes in your health, and be sure to contact your doctor if:     Your belly is getting bigger.      You are gaining weight.      You have any problems. Where can you learn more? Go to [email or web address].[email or web address]. org and sign in to your WordWatch account. Enter L554 in the Paystik box to learn more about \"Nonalcoholic Steatohepatitis (ALVARADO): Care Instructions. \"     If you do not have an account, please click on the \"Sign Up Now\" link. Current as of: September 8, 2021               Content Version: 13.2  © 2645-5944 Healthwise, Incorporated. Care instructions adapted under license by [facility] ([facility]). If you have questions about a medical condition or this instruction, always ask your healthcare professional. [place] [address] any warranty or liability for your use of this information.

## 2022-06-06 NOTE — PROGRESS NOTES
Isabela Victor 64 y.o. male was seen by ARIS Weston on 6/6/2022     Wt Readings from Last 3 Encounters:   06/06/22 246 lb 9.6 oz (111.9 kg)   05/18/22 254 lb 9.6 oz (115.5 kg)   05/18/22 255 lb 6.4 oz (115.8 kg)       HPI  Isabela Victor is a pleasant 64 y.o.  male who presents today with his wife for follow-up on elevated liver enzymes, elevated Ferritin and fatty liver. He denies NSAID use. He has decreased his alcohol intake and reported drinking one beer a week. He denies family history of liver disease. His abdominal ultrasound done on 2- showed hepatic steatosis. His fibroscan done on 5- showed significant steatosis, kPa of 8.2 suggesting F2 fibrosis, however the Fib-4 index of 3.48 suggests advanced fibrosis and possibly cirrhosis. It was noted the patient has significant thrombocytopenia since 2011 without other evidence of cirrhosis. He has an appointment with Hematologist Dr. Corinne Macario today. His appetite is good. He is enrolled in non-surgical bariatric program and is losing weight. No nausea or vomiting. No abdominal pain, bloating or distention. No heartburn or acid reflux. No nocturnal awakenings with acid reflux. No dysphagia or pain with swallowing. His CT of abdomen/pelvis done on 2- showed no acute intra-abdominal abnormality, moderate fat-containing umbilical hernia, mild diverticulosis and hepatic steatosis. His lab work done on 5- showed Ferritin 1,273, Iron 138, UIBC 133, TIBC 271, and Transferrin 51%. Total Bilirubin 2.0, Alkaline Phosphatase 63, ALT 49, and AST 58. CBC with Hgb 15.5, Hct 44.6, MCV 96.7, and Platelets 053. No excess belching or flatulence. No changes in his bowel pattern. His typical bowel pattern is twice daily with soft brown formed stools. Mild constipation with taking Adipex. No diarrhea. No blood in his stools or black tarry stools. No family history of stomach or colon cancer.     ROS  Review of Systems Constitutional: Negative for appetite change, chills, diaphoresis, fever and unexpected weight change. HENT: Negative for ear pain, hearing loss and tinnitus. Eyes: Positive for visual disturbance. Negative for pain and discharge. Respiratory: Positive for cough (intermittent). Negative for shortness of breath and wheezing. Cardiovascular: Positive for leg swelling. Negative for chest pain and palpitations. Gastrointestinal: Negative for abdominal pain, blood in stool, constipation, diarrhea, nausea and vomiting. Endocrine: Negative for cold intolerance and heat intolerance. Genitourinary: Positive for frequency. Negative for dysuria, hematuria and urgency. Musculoskeletal: Positive for neck pain. Negative for back pain and myalgias. Skin: Negative for color change, pallor and rash. Allergic/Immunologic: Negative for environmental allergies and food allergies. Neurological: Negative for dizziness, seizures, weakness and headaches. Hematological: Does not bruise/bleed easily. Psychiatric/Behavioral: Positive for dysphoric mood. Negative for sleep disturbance. The patient is not nervous/anxious.         Allergies  No Known Allergies    Medications  Current Outpatient Medications   Medication Sig Dispense Refill    Potassium 99 MG TABS Take by mouth 2 tabs in am      phentermine (ADIPEX-P) 37.5 MG tablet Take 1 tablet by mouth every morning (before breakfast) for 30 days. 1/2 tab daily x3-5 days and then full tab. BMI 30 tablet 0    meloxicam (MOBIC) 7.5 MG tablet Take 1 tablet by mouth daily as needed for Pain 30 tablet 1    Cinnamon 500 MG CAPS Take by mouth daily      Misc Natural Products (GLUCOSAMINE CHOND COMPLEX/MSM PO) Take by mouth      fish oil-omega-3 fatty acids 1000 MG capsule Take 2 g by mouth daily.  (Takes 3-4 per day)      ZINC PO Take by mouth  (Patient not taking: Reported on 6/6/2022)      albuterol sulfate HFA (PROVENTIL HFA) 108 (90 BASE) MCG/ACT inhaler Inhale 2 puffs into the lungs every 4 hours as needed for Wheezing Can substitute albuterol inhaler per formulary 1 Inhaler 6     No current facility-administered medications for this visit. Past medical history:   He has a past medical history of Bruised ribs, Chronic shoulder pain, Contusion of ribs, Depression, Heel pain, bilateral, Lipoma of arm, Obesity (BMI 30-39.9), STEVEN (obstructive sleep apnea), RAD (reactive airway disease), Rib pain, Right shoulder injury, Total bilirubin, elevated, Tubular adenoma of colon, and Umbilical hernia without obstruction and without gangrene. Past surgical history:  He has a past surgical history that includes Throat surgery (2010); Colonoscopy (2011); lipoma resection (Left, 2011); Umbilical hernia repair (04/18/2018); and Colonoscopy (09/05/2018). Social History:  He reports that he has never smoked. He has quit using smokeless tobacco. He reports current alcohol use. He reports that he does not use drugs. Family history:  His family history includes Allergies in his daughter; Asthma in his daughter; Breast Cancer in his maternal aunt; Cancer in his mother and paternal grandfather; Cancer (age of onset: 79) in his father; Depression in his daughter; Diabetes (age of onset: 59) in his brother; Diabetes (age of onset: 79) in his father; Heart Disease in his father; High Blood Pressure in his father and paternal grandmother; High Cholesterol in his father; Mental Illness in his sister; Migraines in his sister; Seizures in his son; Stroke in his paternal grandmother. Objective    Vitals:    06/06/22 1109   BP: (!) 124/90   Pulse: 76   Temp: 96.9 °F (36.1 °C)   SpO2: 98%        Physical exam    Physical Exam  Constitutional:       General: He is not in acute distress. Appearance: He is well-developed. He is obese. He is not ill-appearing, toxic-appearing or diaphoretic. HENT:      Head: Normocephalic and atraumatic.       Nose: Nose normal.      Mouth/Throat: Mouth: Mucous membranes are moist.   Eyes:      Pupils: Pupils are equal, round, and reactive to light. Cardiovascular:      Rate and Rhythm: Normal rate and regular rhythm. Pulses: Normal pulses. Heart sounds: Normal heart sounds. No murmur heard. No gallop. Pulmonary:      Effort: Pulmonary effort is normal. No respiratory distress. Breath sounds: Normal breath sounds. No wheezing or rhonchi. Abdominal:      General: Bowel sounds are normal. There is no distension. Palpations: Abdomen is soft. There is no mass. Tenderness: There is no abdominal tenderness. Hernia: A hernia (umbilical) is present. Musculoskeletal:         General: Normal range of motion. Cervical back: Neck supple. Skin:     General: Skin is warm and dry. Neurological:      Mental Status: He is alert and oriented to person, place, and time.    Psychiatric:         Mood and Affect: Mood normal.         Hospital Outpatient Visit on 05/11/2022   Component Date Value Ref Range Status    WBC 05/11/2022 4.9  4.0 - 10.5 K/CU MM Final    RBC 05/11/2022 4.61  4.6 - 6.2 M/CU MM Final    Hemoglobin 05/11/2022 15.5  13.5 - 18.0 GM/DL Final    Hematocrit 05/11/2022 44.6  42 - 52 % Final    MCV 05/11/2022 96.7  78 - 100 FL Final    MCH 05/11/2022 33.6* 27 - 31 PG Final    MCHC 05/11/2022 34.8  32.0 - 36.0 % Final    RDW 05/11/2022 12.5  11.7 - 14.9 % Final    Platelets 46/44/3071 120* 140 - 440 K/CU MM Final    MPV 05/11/2022 11.8* 7.5 - 11.1 FL Final    Differential Type 05/11/2022 AUTOMATED DIFFERENTIAL   Final    Segs Relative 05/11/2022 56.7  36 - 66 % Final    Lymphocytes % 05/11/2022 32.2  24 - 44 % Final    Monocytes % 05/11/2022 7.9* 0 - 4 % Final    Eosinophils % 05/11/2022 2.4  0 - 3 % Final    Basophils % 05/11/2022 0.6  0 - 1 % Final    Segs Absolute 05/11/2022 2.8  K/CU MM Final    Lymphocytes Absolute 05/11/2022 1.6  K/CU MM Final    Monocytes Absolute 05/11/2022 0.4  K/CU MM Final  Eosinophils Absolute 05/11/2022 0.1  K/CU MM Final    Basophils Absolute 05/11/2022 0.0  K/CU MM Final    Immature Neutrophil % 05/11/2022 0.2  0 - 0.43 % Final    Total Immature Neutrophil 05/11/2022 0.01  K/CU MM Final    Ferritin 05/11/2022 1,273* 30 - 400 NG/ML Final    Albumin 05/11/2022 4.5  3.4 - 5.0 GM/DL Final    Total Bilirubin 05/11/2022 2.0* 0.0 - 1.0 MG/DL Final    Bilirubin, Direct 05/11/2022 0.4* 0.0 - 0.3 MG/DL Final    Bilirubin, Indirect 05/11/2022 1.6* 0 - 0.7 MG/DL Final    Alkaline Phosphatase 05/11/2022 63  40 - 129 IU/L Final    AST 05/11/2022 49* 15 - 37 IU/L Final    ALT 05/11/2022 58* 10 - 40 U/L Final    Total Protein 05/11/2022 6.8  6.4 - 8.2 GM/DL Final    Sodium 05/11/2022 140  135 - 145 MMOL/L Final    Potassium 05/11/2022 3.9  3.5 - 5.1 MMOL/L Final    Chloride 05/11/2022 105  99 - 110 mMol/L Final    CO2 05/11/2022 25  21 - 32 MMOL/L Final    Anion Gap 05/11/2022 10  4 - 16 Final    BUN 05/11/2022 22  6 - 23 MG/DL Final    CREATININE 05/11/2022 1.0  0.9 - 1.3 MG/DL Final    Glucose 05/11/2022 84  70 - 99 MG/DL Final    Calcium 05/11/2022 9.3  8.3 - 10.6 MG/DL Final    GFR Non- 05/11/2022 >60  >60 mL/min/1.73m2 Final    GFR  05/11/2022 >60  >60 mL/min/1.73m2 Final    Albumin 05/11/2022 4.5  3.4 - 5.0 GM/DL Final    Phosphorus 05/11/2022 3.4  2.5 - 4.9 MG/DL Final    Iron 05/11/2022 138  59 - 158 ug/dL Final    UIBC 05/11/2022 133  110 - 370 ug/dL Final    TIBC 05/11/2022 271  250 - 450 ug/dL Final    Transferrin % 05/11/2022 51* 10 - 44 % Final     Discussed patient with Dr. Alexandria Stewart prior to appointment and he agrees with plan; we will evaluate Hemochromatosis lab work and decide if liver biopsy is indicated. Assessment and Plan:  1. Elevated liver enzymes most likely due to fatty liver his chronic liver work-up showed no evidence of autoimmune hepatitis, Hepatitis A, B or C, Tavo's disease or PBC.   Fibroscan showed F2. His Fib 4 index 3.27 with some concern for advanced liver fibrosis. The patient was encouraged to remain abstinent from alcohol and work on weight loss. May consider liver biopsy after Hemochromatosis mutation results are in.  2.  Fatty liver noted on CT of abdomen in 2018 recommend weight loss at least 10% of his body weight. The patient was provided with information on fatty liver and liver disease diet. The patient was encouraged to continue with non-surgical bariatric program and daily exercise. Avoid alcohol. 3.  Thrombocytopenia and elevated Ferritin will order Hemochromatosis mutation to rule out Hemachromatosis. Recommend keeping Hematology appointment for further work up. 4.  The patient was encouraged to follow-up in 3 months.     Total time:  25 minutes.

## 2022-06-06 NOTE — PROGRESS NOTES
MA Rooming Questions  Patient: Mehnaz Martínez  MRN: [id number]    Date: 6/6/2022        1. Do you have any new issues?    [address] [place] [address], Miguel Guo

## 2022-06-08 LAB
FOLATE: 6.9 NG/ML (ref 3.1–17.5)
VITAMIN B-12: 228 PG/ML (ref 211–911)

## 2022-06-11 LAB
C282Y HEMOCHROMATOSIS MUT: NEGATIVE
H63D HEMOCHROMATOSIS MUT: NORMAL
HEMOCHROMATOSIS MUTATION C282Y/H63D: NORMAL
HFE PCR SPECIMEN: NORMAL
S65C HEMOCHROMATOSIS MUT: NEGATIVE

## 2022-06-16 ENCOUNTER — OFFICE VISIT (OUTPATIENT)
Dept: BARIATRICS/WEIGHT MGMT | Age: 62
End: 2022-06-16
Payer: COMMERCIAL

## 2022-06-16 VITALS
HEIGHT: 71 IN | HEART RATE: 96 BPM | WEIGHT: 244 LBS | SYSTOLIC BLOOD PRESSURE: 126 MMHG | DIASTOLIC BLOOD PRESSURE: 82 MMHG | BODY MASS INDEX: 34.16 KG/M2 | OXYGEN SATURATION: 99 %

## 2022-06-16 DIAGNOSIS — E66.9 OBESITY (BMI 30-39.9): ICD-10-CM

## 2022-06-16 DIAGNOSIS — Z79.899 MEDICATION MANAGEMENT: Primary | ICD-10-CM

## 2022-06-16 PROCEDURE — 99213 OFFICE O/P EST LOW 20 MIN: CPT | Performed by: NURSE PRACTITIONER

## 2022-06-16 PROCEDURE — G8417 CALC BMI ABV UP PARAM F/U: HCPCS | Performed by: NURSE PRACTITIONER

## 2022-06-16 PROCEDURE — G8427 DOCREV CUR MEDS BY ELIG CLIN: HCPCS | Performed by: NURSE PRACTITIONER

## 2022-06-16 PROCEDURE — 1036F TOBACCO NON-USER: CPT | Performed by: NURSE PRACTITIONER

## 2022-06-16 PROCEDURE — 3017F COLORECTAL CA SCREEN DOC REV: CPT | Performed by: NURSE PRACTITIONER

## 2022-06-16 ASSESSMENT — PATIENT HEALTH QUESTIONNAIRE - PHQ9
SUM OF ALL RESPONSES TO PHQ QUESTIONS 1-9: 0
SUM OF ALL RESPONSES TO PHQ QUESTIONS 1-9: 0
2. FEELING DOWN, DEPRESSED OR HOPELESS: 0
SUM OF ALL RESPONSES TO PHQ QUESTIONS 1-9: 0
SUM OF ALL RESPONSES TO PHQ9 QUESTIONS 1 & 2: 0
1. LITTLE INTEREST OR PLEASURE IN DOING THINGS: 0
SUM OF ALL RESPONSES TO PHQ QUESTIONS 1-9: 0
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
## 2022-06-17 ENCOUNTER — TELEPHONE (OUTPATIENT)
Dept: GASTROENTEROLOGY | Age: 62
End: 2022-06-17

## 2022-06-17 DIAGNOSIS — R79.89 ELEVATED FERRITIN: Primary | ICD-10-CM

## 2022-06-17 DIAGNOSIS — K76.0 FATTY LIVER: ICD-10-CM

## 2022-06-17 RX ORDER — PHENTERMINE HYDROCHLORIDE 37.5 MG/1
37.5 TABLET ORAL
Qty: 30 TABLET | Refills: 0 | Status: SHIPPED | OUTPATIENT
Start: 2022-06-17 | End: 2022-07-17

## 2022-06-17 ASSESSMENT — ENCOUNTER SYMPTOMS
ALLERGIC/IMMUNOLOGIC NEGATIVE: 1
EYES NEGATIVE: 1
RESPIRATORY NEGATIVE: 1

## 2022-06-17 NOTE — TELEPHONE ENCOUNTER
Discussed with Dr. Gabrielle Tavares and hemachromatosis mutation was negative; the patient was called and wife POA discussed Hemochromatosis results-negative. Will proceed with liver biopsy to rule out cirrhosis and for cause of advanced liver disease. Labs have been ordered and instructed to complete PT/INR and quantitative for hepatic iron prior to the liver biopsy. Discussed with wife POA with permission to proceed.

## 2022-06-20 ENCOUNTER — OFFICE VISIT (OUTPATIENT)
Dept: ONCOLOGY | Age: 62
End: 2022-06-20
Payer: COMMERCIAL

## 2022-06-20 ENCOUNTER — HOSPITAL ENCOUNTER (OUTPATIENT)
Age: 62
Discharge: HOME | End: 2022-06-20
Payer: COMMERCIAL

## 2022-06-20 ENCOUNTER — HOSPITAL ENCOUNTER (OUTPATIENT)
Dept: INFUSION THERAPY | Age: 62
Discharge: HOME | End: 2022-06-20

## 2022-06-20 VITALS
HEIGHT: 71 IN | HEART RATE: 71 BPM | OXYGEN SATURATION: 98 % | TEMPERATURE: 98.1 F | SYSTOLIC BLOOD PRESSURE: 142 MMHG | BODY MASS INDEX: 34.38 KG/M2 | DIASTOLIC BLOOD PRESSURE: 88 MMHG | WEIGHT: 245.6 LBS | RESPIRATION RATE: 16 BRPM

## 2022-06-20 DIAGNOSIS — R79.89 ELEVATED FERRITIN: Primary | ICD-10-CM

## 2022-06-20 DIAGNOSIS — Z79.899 MEDICATION MANAGEMENT: ICD-10-CM

## 2022-06-20 LAB
ALBUMIN SERPL-MCNC: 4.1 GM/DL (ref 3.4–5)
ALBUMIN SERPL-MCNC: 4.1 GM/DL (ref 3.4–5)
ALP BLD-CCNC: 70 IU/L (ref 40–129)
ALT SERPL-CCNC: 36 U/L (ref 10–40)
ANION GAP SERPL CALCULATED.3IONS-SCNC: 12 MMOL/L (ref 4–16)
AST SERPL-CCNC: 30 IU/L (ref 15–37)
BASOPHILS ABSOLUTE: 0 K/CU MM
BASOPHILS RELATIVE PERCENT: 0.7 % (ref 0–1)
BILIRUB SERPL-MCNC: 1.6 MG/DL (ref 0–1)
BILIRUBIN DIRECT: 0.2 MG/DL (ref 0–0.3)
BILIRUBIN, INDIRECT: 1.4 MG/DL (ref 0–0.7)
BUN BLDV-MCNC: 13 MG/DL (ref 6–23)
CALCIUM SERPL-MCNC: 9 MG/DL (ref 8.3–10.6)
CHLORIDE BLD-SCNC: 107 MMOL/L (ref 99–110)
CO2: 21 MMOL/L (ref 21–32)
CREAT SERPL-MCNC: 1.1 MG/DL (ref 0.9–1.3)
DIFFERENTIAL TYPE: ABNORMAL
EOSINOPHILS ABSOLUTE: 0.2 K/CU MM
EOSINOPHILS RELATIVE PERCENT: 3.9 % (ref 0–3)
GFR AFRICAN AMERICAN: >60 ML/MIN/1.73M2
GFR NON-AFRICAN AMERICAN: >60 ML/MIN/1.73M2
GLUCOSE BLD-MCNC: 103 MG/DL (ref 70–99)
HCT VFR BLD CALC: 43.7 % (ref 42–52)
HEMOGLOBIN: 15.1 GM/DL (ref 13.5–18)
IMMATURE NEUTROPHIL %: 0.2 % (ref 0–0.43)
INR BLD: 0.94 INDEX
LYMPHOCYTES ABSOLUTE: 1.6 K/CU MM
LYMPHOCYTES RELATIVE PERCENT: 28.6 % (ref 24–44)
MCH RBC QN AUTO: 34.3 PG (ref 27–31)
MCHC RBC AUTO-ENTMCNC: 34.6 % (ref 32–36)
MCV RBC AUTO: 99.3 FL (ref 78–100)
MONOCYTES ABSOLUTE: 0.4 K/CU MM
MONOCYTES RELATIVE PERCENT: 8.1 % (ref 0–4)
PDW BLD-RTO: 13.2 % (ref 11.7–14.9)
PHOSPHORUS: 3.7 MG/DL (ref 2.5–4.9)
PLATELET # BLD: 116 K/CU MM (ref 140–440)
PMV BLD AUTO: 10.6 FL (ref 7.5–11.1)
POTASSIUM SERPL-SCNC: 4.3 MMOL/L (ref 3.5–5.1)
PROTHROMBIN TIME: 11.7 SECONDS (ref 11.7–14.5)
RBC # BLD: 4.4 M/CU MM (ref 4.6–6.2)
SEGMENTED NEUTROPHILS ABSOLUTE COUNT: 3.2 K/CU MM
SEGMENTED NEUTROPHILS RELATIVE PERCENT: 58.5 % (ref 36–66)
SODIUM BLD-SCNC: 140 MMOL/L (ref 135–145)
TOTAL IMMATURE NEUTOROPHIL: 0.01 K/CU MM
TOTAL PROTEIN: 6.5 GM/DL (ref 6.4–8.2)
TRANSFERRIN: 235.7 MG/DL (ref 200–360)
WBC # BLD: 5.5 K/CU MM (ref 4–10.5)

## 2022-06-20 PROCEDURE — 3017F COLORECTAL CA SCREEN DOC REV: CPT | Performed by: INTERNAL MEDICINE

## 2022-06-20 PROCEDURE — 82248 BILIRUBIN DIRECT: CPT

## 2022-06-20 PROCEDURE — 84100 ASSAY OF PHOSPHORUS: CPT

## 2022-06-20 PROCEDURE — 85610 PROTHROMBIN TIME: CPT

## 2022-06-20 PROCEDURE — G8417 CALC BMI ABV UP PARAM F/U: HCPCS | Performed by: INTERNAL MEDICINE

## 2022-06-20 PROCEDURE — 85025 COMPLETE CBC W/AUTO DIFF WBC: CPT

## 2022-06-20 PROCEDURE — 99214 OFFICE O/P EST MOD 30 MIN: CPT | Performed by: INTERNAL MEDICINE

## 2022-06-20 PROCEDURE — 1036F TOBACCO NON-USER: CPT | Performed by: INTERNAL MEDICINE

## 2022-06-20 PROCEDURE — G8427 DOCREV CUR MEDS BY ELIG CLIN: HCPCS | Performed by: INTERNAL MEDICINE

## 2022-06-20 PROCEDURE — 80053 COMPREHEN METABOLIC PANEL: CPT

## 2022-06-20 PROCEDURE — 84466 ASSAY OF TRANSFERRIN: CPT

## 2022-06-20 PROCEDURE — 36415 COLL VENOUS BLD VENIPUNCTURE: CPT

## 2022-06-20 RX ORDER — PHENTERMINE HYDROCHLORIDE 37.5 MG/1
TABLET ORAL
Qty: 30 TABLET | Refills: 0 | OUTPATIENT
Start: 2022-06-20

## 2022-06-20 NOTE — PROGRESS NOTES
Patient Name:  Eliazar Brandt  Patient :  1960  Patient MRN:  [id number]     Primary Oncologist: Bird Hammonds MD  Referring Provider: Funmilayo Alvarez MD     Date of Service: 2022     Chief Complaint:    Chief Complaint   Patient presents with    Follow-up     Patient Active Problem List:     Obesity, Class II, BMI 35-39.9, with comorbidity ([address]     Chronic right shoulder pain     STEVEN on CPAP     Total bilirubin, elevated     Fatigue     Major depressive disorder, recurrent episode, mild ([address]     Erectile dysfunction     Colon polyps     RAD (reactive airway disease)     Impaired fasting glucose     Hepatic steatosis- seen on CT abdomen     Diverticulosis of large intestine- seen on CT abdomen     Hypersomnia     Primary osteoarthritis of right knee     Cough variant asthma    HPI:   Eliazar Brandt is a 28-year-old very pleasant gentleman with medical history history significant for hyperlipidemia, depression, obstructive sleep apnea, obesity, fatty liver and osteoarthritis, referred to me on 2022 for evaluation of elevated serum ferritin level with high transferrin saturation and mild thrombocytopenia. He has been having elevated liver enzymes and has been followed by gastroenterology. He recently established care as a new patient with Dr. Zena Knott. Viral hepatitis panels done on 10/18/21 and 3/3/22 were negative. JENNIFER, F-actin IgG, tissue transglut Ab, transglutaminase IgA were negative. A1AT and ceruloplasmin were within normal range. CT abdomen/pelvis in 2018 showed fatty liver. US abdomen on 22 showed hepatitis steatosis. Fibroscan done on 22 showed advanced fibrosis and possibly cirrhosis. Iron panel on 10/18/21, 3/3/22 and 22 showed persistently elevated serum ferritin level and transferrin saturation. He also has mild thrombocytopenia since . He doesn't have anemia or neutropenia. He stopped drinking alcohol since 3/2022.  Before that, he used to drink alcohol almost daily since he was 18. Laboratory work ups done on 6/6/22 showed high serum ferritin (1144 ng/ml), high transferrin (55%) and heterozygous H63D hemochromatosis mutation. His B12 is in low normal range (228 pg/ml) and has mild thrombocytopenia (123,000/cumm). On June 20, 2022, he presented to me for follow up. I reviewed with him findings on labs done on 6/6/22. He has heterozygous H63D hemochromatosis mutation and it doesn't usually cause clinical symptoms of hereditary hemochromatosis. I believe his elevated serum iron level is combination of underlying liver disease and heterozygous H63D mutation. He used to donate blood before and I encouraged him to continue to do so to help with his iron status. Also encouraged him not to resume back on alcohol drinking. I will re evaluate his iron status in 4 months. He is also planned to have liver biopsy and I will follow up with the results. He has mild thrombocytopenia since 2011. He doesn't have anemia or neutropenia. JENNIFER and hepatitis panel were negative. Will supplement with B12 starting from today. I believe his thrombocytopenia is due to either underlying chronic liver disease or chronic ITP. MDS is less likely in view of his normal hemoglobin and neutrophil count. Will continue with observation for now. He doesn't have any significant symptoms at today visit. Past Medical History:     Significant for  1. Hyperlipidemia  2. Depression  3. Obstructive sleep apnea  4. Obesity  5. Fatty liver  6. Osteoarthritis    Past Surgery History:    Significant for  1. Umbilical hernia repair in 4/18/2018  2. Throat surgery in 2010  3. Lipoma resection in 2011    Social History:   He denies smoking or illicit drug abuse. He stopped drinking alcohol since March 2022. He used to drink alcohol almost daily before since he was 18.     Family History:    Significant for breast cancer in his maternal aunt, cervical cancer and his mother, Labs:  Hematology:  Lab Results   Component Value Date    WBC 5.5 06/20/2022    RBC 4.40 (L) 06/20/2022    HGB 15.1 06/20/2022    HCT 43.7 06/20/2022    MCV 99.3 06/20/2022    MCH 34.3 (H) 06/20/2022    MCHC 34.6 06/20/2022    RDW 13.2 06/20/2022     (L) 06/20/2022    MPV 10.6 06/20/2022    SEGSPCT 58.5 06/20/2022    EOSRELPCT 3.9 (H) 06/20/2022    BASOPCT 0.7 06/20/2022    LYMPHOPCT 28.6 06/20/2022    MONOPCT 8.1 (H) 06/20/2022    SEGSABS 3.2 06/20/2022    EOSABS 0.2 06/20/2022    BASOSABS 0.0 06/20/2022    LYMPHSABS 1.6 06/20/2022    MONOSABS 0.4 06/20/2022    DIFFTYPE AUTOMATED DIFFERENTIAL 06/20/2022     Lab Results   Component Value Date    ESR 1 06/06/2022     Chemistry:  Lab Results   Component Value Date     06/20/2022    K 4.3 06/20/2022     06/20/2022    CO2 21 06/20/2022    BUN 13 06/20/2022    CREATININE 1.1 06/20/2022    GLUCOSE 103 (H) 06/20/2022    CALCIUM 9.0 06/20/2022    PROT 6.5 06/20/2022    LABALBU 4.1 06/20/2022    LABALBU 4.1 06/20/2022    BILITOT 1.6 (H) 06/20/2022    ALKPHOS 70 06/20/2022    AST 30 06/20/2022    ALT 36 06/20/2022    LABGLOM >60 06/20/2022    GFRAA >60 06/20/2022    AGRATIO 2.0 09/16/2021    GLOB 2.2 09/16/2021    PHOS 3.7 06/20/2022     Lab Results   Component Value Date     06/06/2022     No components found for: LD  Lab Results   Component Value Date    TSHHS 3.570 02/18/2022    T4FREE 1.13 02/18/2022     Immunology:  Lab Results   Component Value Date    PROT 6.5 06/20/2022     No results found for: Raphael Mansfield, KLFLCR  No results found for: B2M  Coagulation Panel:  Lab Results   Component Value Date    PROTIME 11.7 06/20/2022    INR 0.94 06/20/2022    APTT 29.8 03/03/2022     Anemia Panel:  Lab Results   Component Value Date    WJMSUJTL57 228.0 06/06/2022    FOLATE 6.9 06/06/2022     Tumor Markers:  Lab Results   Component Value Date    PSA 0.88 10/20/2011        Observations:  No data recorded     Assessment   Elevated serum ferritin level and transferrin saturation  Thrombocytopenia    Plan:  Destiney Osborne is a 78-year-old very pleasant gentleman who was found to have elevated liver enzymes since 9/2021. Viral hepatitis and auto immune hepatitis panel and stewart disease were ruled out already. CT abdomen/pelvis in 2018 and US abdomen in 2/18/22 showed hepatitis steatosis. Fibroscan done on 5/18/22 showed advanced fibrosis and possibly cirrhosis. Iron panel on 10/18/21, 3/3/22 and 5/11/22 showed persistently elevated serum ferritin level and transferrin saturation. Laboratory work ups done on 6/6/22 showed high serum ferritin (1144 ng/ml), high transferrin (55%) and heterozygous H63D hemochromatosis mutation. His B12 is in low normal range (228 pg/ml) and has mild thrombocytopenia (123,000/cumm). On June 20, 2022, he presented to me for follow up. I reviewed with him findings on labs done on 6/6/22. He has heterozygous H63D hemochromatosis mutation and it doesn't usually cause clinical symptoms of hereditary hemochromatosis. I believe his elevated serum iron level is combination of underlying liver disease and heterozygous H63D mutation. He used to donate blood before and I encouraged him to continue to do so to help with his iron status. Also encouraged him not to resume back on alcohol drinking. I will re evaluate his iron status in 4 months. He is also planned to have liver biopsy and I will follow up with the results. He has mild thrombocytopenia since 2011. He doesn't have anemia or neutropenia. JENNIFER and hepatitis panel were negative. Will supplement with B12 starting from today. I believe his thrombocytopenia is due to either underlying chronic liver disease or chronic ITP. MDS is less likely in view of his normal hemoglobin and neutrophil count. Will continue with observation for now. I answered all his questions and concerns for today.  I asked him to follow up with primary care physician on regular basis. Recent imaging and labs were reviewed and discussed with the patient.

## 2022-06-20 NOTE — PROGRESS NOTES
MA Rooming Questions  Patient: Brianna Bowles  MRN: [id number]    Date: 6/20/2022        1. Do you have any new issues?   no         2. Do you need any refills on medications?    no    3. Have you had any imaging done since your last visit?   no    4. Have you been hospitalized or seen in the emergency room since your last visit here?   no    5. Did the patient have a depression screening completed today?  No    No data recorded     PHQ-9 Given to (if applicable):               PHQ-9 Score (if applicable):                     [] Positive     []  Negative              Does question #9 need addressed (if applicable)                     [] Yes    []  No               Tanner Javed CMA

## 2022-06-29 ENCOUNTER — OFFICE VISIT (OUTPATIENT)
Dept: INTERNAL MEDICINE CLINIC | Age: 62
End: 2022-06-29
Payer: COMMERCIAL

## 2022-06-29 VITALS
HEIGHT: 70 IN | OXYGEN SATURATION: 96 % | RESPIRATION RATE: 20 BRPM | DIASTOLIC BLOOD PRESSURE: 88 MMHG | SYSTOLIC BLOOD PRESSURE: 126 MMHG | WEIGHT: 245 LBS | BODY MASS INDEX: 35.07 KG/M2 | HEART RATE: 72 BPM

## 2022-06-29 DIAGNOSIS — K76.9 LIVER DISEASE: Primary | ICD-10-CM

## 2022-06-29 DIAGNOSIS — R79.89 HIGH SERUM TRANSFERRIN SATURATION: ICD-10-CM

## 2022-06-29 DIAGNOSIS — E66.9 OBESITY (BMI 30-39.9): ICD-10-CM

## 2022-06-29 DIAGNOSIS — R79.89 HIGH SERUM FERRITIN: ICD-10-CM

## 2022-06-29 DIAGNOSIS — D69.6 THROMBOCYTOPENIA (HCC): ICD-10-CM

## 2022-06-29 PROCEDURE — 3017F COLORECTAL CA SCREEN DOC REV: CPT | Performed by: INTERNAL MEDICINE

## 2022-06-29 PROCEDURE — G8427 DOCREV CUR MEDS BY ELIG CLIN: HCPCS | Performed by: INTERNAL MEDICINE

## 2022-06-29 PROCEDURE — 99214 OFFICE O/P EST MOD 30 MIN: CPT | Performed by: INTERNAL MEDICINE

## 2022-06-29 PROCEDURE — G8417 CALC BMI ABV UP PARAM F/U: HCPCS | Performed by: INTERNAL MEDICINE

## 2022-06-29 PROCEDURE — 1036F TOBACCO NON-USER: CPT | Performed by: INTERNAL MEDICINE

## 2022-06-29 RX ORDER — LANOLIN ALCOHOL/MO/W.PET/CERES
1000 CREAM (GRAM) TOPICAL DAILY
COMMUNITY

## 2022-06-29 NOTE — PROGRESS NOTES
6/29/22    Red Parsons  1960    Chief Complaint   Patient presents with    Other     6wk fu        History of Present Illness:  Red Parsons is a 64 y.o. pleasant gentleman presenting today with a chief complaint of liver disease scheduled for liver biopsy. He has a past medical history significant for:  HL (,  on 5/18/2021), on Omega-3   NAFLD  Cough-variant asthma, on Albuterol inh PRN   STEVEN s/p UPPP, on CPAP QHS  OA  Depression, off Celexa    Vitamin B12 deficiency, on PO Cyanocobalamin   Obesity (BMI 35)   S/p umbilical hernia repair (Dr. Adry Haas)   Never smoker   Occasional alcohol use   Supplements: Cinnamon, Chondroitin, Zinc, K gluconate    # BP today 126/88. He is not on anti-hypertensive. # Sees Dr. Bala Crespo for STEVEN. Uses a CPAP. # Colonoscopy in 2014 with 2 polyps. Was recommended repeat in 5 years  but did not follow through. Had FIT test in 2021. Colonoscopy recently through Dr. Du Gaxiola (I do not have report yet). # Seeing GI for elevated ALT, AST, Tbili. Fibroscan with possible cirrhosis. Has seen Ophtho Dr. Pierre Dealuciana for possible Kayser-Fleischer which was unremarkable. Autoimmune, hepatitis, CK unremarkable. Aldolase slightly elevated. Ferritin and Tsat high. UDS neg. Ethanol below detection limit. A1AT WNL. Ceruloplasmin WNL. Has thrombocytopenia but noted to be prior to having any liver issues. TCP since 2011. CT A/P in 2018 with only hepatic steatosis. Only once in 2021 he had macrocytosis, otherwise MCV has been normal.only once in 2011 he had leukopenia, otherwise RBC and WBC counts have been WNL. Seeing Heme. 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 Will be getting liver biopsy 7/5. No known FHx of liver disease. Paternal grandfather with leukemia (unknown type). # [facility].  [place]AR[place] reviewed.      [facility] to [facility], [address]       Health maintenance:   Health Maintenance Due   Topic Date Due    Pneumococcal 0-64 years Vaccine (1 - PCV) Never done    Prostate Specific Antigen (PSA) Screening or Monitoring  10/20/2012    Colorectal Cancer Screen  09/29/2021         Review of Systems:  Constitutional: no fevers, no chills, no night sweats, no weight loss, no weight gain, no fatigue   Pain assessment: OA pains  Head: no headaches  Ears: no hearing loss, no tinnitus, no vertigo  Eyes: no blurry vision, no diplopia, no dryness, no itchiness  Mouth: no oral ulcers, no dry mouth, no sore throat  Nose: no nasal congestion, no epistaxis  Cardiac: no chest pain, no palpitations, no leg swelling, no orthopnea, no PND, no syncope  Pulmonary: no dyspnea, no cough, no wheezing, no hemoptysis  GI: no nausea, no vomiting, no diarrhea, no constipation, no abdominal pain, no hematochezia  : no dysuria, no frequency, no urgency, no hematuria, no frothy urine  MSK: no Raynaud's   Neuro: no focal neurological deficits, no seizures  Sleep: no snoring, no daytime somnolence   Psych: no depression, no anxiety, no suicidal ideation      Physical Exam:  VITALS:   /88 (Site: Right Upper Arm, Position: Sitting, Cuff Size: Large Adult)   Pulse 72   Resp 20   Ht 5' 10\" (1.778 m)   Wt 245 lb (111.1 kg)   SpO2 96%   BMI 35.15 kg/m²     PHYSICAL EXAMINATION:  General: alert, awake, and oriented to time, place, person, and situation. Not in acute distress. ?flat affect  Skin:  no suspicious rashes, no jaundice  Head: normocephalic/atraumatic  Eyes: anicteric sclera, well-injected conjunctiva. Pupils are equally round and reactive to light.  Extraocular movements are intact   Nose: no septal deviation evident  Sinuses: no sinus tenderness  Ears: external ears normal  Neck: supple, no cervical lymphadenopathy, thyroid symmetric and not enlarged, no bruits   Heart: regular rate and rhythm, regular S1/S2, no S3/S4, no audible murmurs, no audible friction rub  Lungs: clear to auscultation bilaterally, no audible crackles, no audible wheezes, no audible rhonchi    Abdomen: normal bowel sounds, soft abdomen, non-tender, no palpable masses  Extremities: no edema, warm, no cyanosis, no clubbing. Good capillary refill   MSK: no tenderness across spinous processes, full ROM in all 4 extremities. No joint swelling or tenderness   Peripheral vascular: 2+ pulses symmetric (radial)  Neuro: gait normal, CN II-XII intact, motor power 5/5 in all 4 extremities, sensation intact and symmetric    Labs   I have personally reviewed labs, and discussed pertinent findings with patient on this date 6/29/2022     Imaging   I have personally reviewed imaging, and discussed pertinent findings with patient on this date 6/29/2022     Other notes  I have personally reviewed other notes, and discussed pertinent findings with patient on this date 6/29/2022       Assessment/Plan:     1. Liver disease  Complex. Unclear diagnosis at the moment. Scheduled for liver biopsy 7/5 to determine stage and etiology   - CBC with Auto Differential; Future  - Hepatic Function Panel; Future  - Renal Function Panel; Future    2. Thrombocytopenia (HCC)  ITP vs liver disease  Following with Dr. [facility]   - CBC with Auto Differential; Future    3. High serum transferrin saturation  Heterozygous H63D. Low risk for true hemochromatosis  Continue blood donation every 4 months  Labs through Dr. [facility]    4. High serum ferritin  Heterozygous H63D. Low risk for true hemochromatosis  Continue blood donation every 4 months  Labs through Dr. Heather [facility]    5. Obesity (BMI 30-39. 9)  BMI 35.15  Seeing [facility]. Encouraged continued weight loss. Currently on Adipex. May be switching to Qsymia after total 3 months       Care discussed with patient and questions answered. Patient verbalizes understanding and agrees with plan. Discussed with patient the importance of continuity of care. I encouraged patient to schedule next appointment within 3 months with me.  Patient prefers to be reached by Phone call at [phone] for future medical correspondence. Encouraged to activate MyChart. I reviewed and reconciled the medications this visit. I reviewed and updated the past medical, surgical, social, and family history during this visit. After visit summary provided.        Pauline Gabriel MD  Internal Medicine  6/29/2022   11:24 AM

## 2022-06-30 NOTE — PROGRESS NOTES
.IR Procedure at [facility]:  Left message for patient to arrive at 0730 at [facility] on 7/6/2022 for his procedure at 0930. Also went over below instructions and told patient to disregard any text message he gets with an arrival time. Left another message on [facility] patient's wife cell phone with an arrival time of 0730 at [facility] on 7/6/2022 for his procedure at 0930  and below instructions. 1. NPO at Midnight     (Paracentesis, Thoracentesis, Thyroid Bx and Injections may have a light breakfast)  2. Follow your directions as prescribed by the doctor for your procedure and medications. 3.   Consult your provider as to when to stop blood thinner  4. Do not take any pain medication within 6 hours of your procedure  5. Do not drink any alcoholic beverages or use any street drugs 24 hours before procedure. 6.   Please wear simple, loose fitting clothing to the hospital.  Do not bring valuables (money,             credit cards, checkbooks, etc.)     7. If you  have a Living Will and Durable Power of  for Healthcare, please bring in a copy. 8.   Please bring picture ID,  insurance card, paperwork from the doctors office            (H & P, Consent,  & card for implantable devices). 9.   Report to the information desk on the ground floor. 10. Take a shower the night before or morning of your procedure, do not apply any lotion, oil or powder. 11. If you are going to be sedated for the procedure, you will need a responsible adult to drive you home.

## 2022-07-05 ENCOUNTER — OFFICE VISIT (OUTPATIENT)
Dept: BARIATRICS/WEIGHT MGMT | Age: 62
End: 2022-07-05
Payer: COMMERCIAL

## 2022-07-05 VITALS
HEIGHT: 71 IN | SYSTOLIC BLOOD PRESSURE: 122 MMHG | BODY MASS INDEX: 33.42 KG/M2 | HEART RATE: 76 BPM | WEIGHT: 238.7 LBS | DIASTOLIC BLOOD PRESSURE: 84 MMHG | OXYGEN SATURATION: 97 %

## 2022-07-05 DIAGNOSIS — E66.9 OBESITY (BMI 30-39.9): ICD-10-CM

## 2022-07-05 DIAGNOSIS — Z79.899 MEDICATION MANAGEMENT: Primary | ICD-10-CM

## 2022-07-05 PROCEDURE — G8427 DOCREV CUR MEDS BY ELIG CLIN: HCPCS | Performed by: NURSE PRACTITIONER

## 2022-07-05 PROCEDURE — 1036F TOBACCO NON-USER: CPT | Performed by: NURSE PRACTITIONER

## 2022-07-05 PROCEDURE — 99213 OFFICE O/P EST LOW 20 MIN: CPT | Performed by: NURSE PRACTITIONER

## 2022-07-05 PROCEDURE — G8417 CALC BMI ABV UP PARAM F/U: HCPCS | Performed by: NURSE PRACTITIONER

## 2022-07-05 PROCEDURE — 3017F COLORECTAL CA SCREEN DOC REV: CPT | Performed by: NURSE PRACTITIONER

## 2022-07-05 ASSESSMENT — PATIENT HEALTH QUESTIONNAIRE - PHQ9
SUM OF ALL RESPONSES TO PHQ QUESTIONS 1-9: 0
SUM OF ALL RESPONSES TO PHQ QUESTIONS 1-9: 0
1. LITTLE INTEREST OR PLEASURE IN DOING THINGS: 0
SUM OF ALL RESPONSES TO PHQ9 QUESTIONS 1 & 2: 0
SUM OF ALL RESPONSES TO PHQ QUESTIONS 1-9: 0
2. FEELING DOWN, DEPRESSED OR HOPELESS: 0
SUM OF ALL RESPONSES TO PHQ QUESTIONS 1-9: 0

## 2022-07-05 ASSESSMENT — ENCOUNTER SYMPTOMS
RESPIRATORY NEGATIVE: 1
EYES NEGATIVE: 1
ALLERGIC/IMMUNOLOGIC NEGATIVE: 1
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
## 2022-07-06 ENCOUNTER — HOSPITAL ENCOUNTER (OUTPATIENT)
Dept: INTERVENTIONAL RADIOLOGY/VASCULAR | Age: 62
Discharge: HOME | End: 2022-07-06
Payer: COMMERCIAL

## 2022-07-06 VITALS
SYSTOLIC BLOOD PRESSURE: 121 MMHG | RESPIRATION RATE: 16 BRPM | TEMPERATURE: 97.4 F | HEART RATE: 56 BPM | DIASTOLIC BLOOD PRESSURE: 77 MMHG | OXYGEN SATURATION: 97 %

## 2022-07-06 DIAGNOSIS — R79.89 HYPOURICEMIA: ICD-10-CM

## 2022-07-06 PROCEDURE — 2709999900 HC NON-CHARGEABLE SUPPLY

## 2022-07-06 PROCEDURE — 2580000003 HC RX 258: Performed by: RADIOLOGY

## 2022-07-06 PROCEDURE — 88307 TISSUE EXAM BY PATHOLOGIST: CPT | Performed by: PATHOLOGY

## 2022-07-06 PROCEDURE — 47000 NEEDLE BIOPSY OF LIVER PERQ: CPT

## 2022-07-06 PROCEDURE — 88313 SPECIAL STAINS GROUP 2: CPT | Performed by: PATHOLOGY

## 2022-07-06 PROCEDURE — 7100000010 HC PHASE II RECOVERY - FIRST 15 MIN

## 2022-07-06 PROCEDURE — 7100000011 HC PHASE II RECOVERY - ADDTL 15 MIN

## 2022-07-06 PROCEDURE — 76942 ECHO GUIDE FOR BIOPSY: CPT

## 2022-07-06 PROCEDURE — 88312 SPECIAL STAINS GROUP 1: CPT | Performed by: PATHOLOGY

## 2022-07-06 RX ORDER — SODIUM CHLORIDE 0.9 % (FLUSH) 0.9 %
10 SYRINGE (ML) INJECTION PRN
Status: DISCONTINUED | OUTPATIENT
Start: 2022-07-06 | End: 2022-07-07 | Stop reason: HOSPADM

## 2022-07-06 RX ADMIN — SODIUM CHLORIDE, PRESERVATIVE FREE 10 ML: 5 INJECTION INTRAVENOUS at 07:43

## 2022-07-06 ASSESSMENT — PAIN SCALES - GENERAL
PAINLEVEL_OUTOF10: 0
PAINLEVEL_OUTOF10: 0

## 2022-07-06 NOTE — PROCEDURES
PROCEDURE PERFORMED: Liver Biopsy BY Dr. Tulio Mathew: Rule out cirrhosis     INFORMED CONSENT:  Obtained prior to procedure. Consent placed in chart. KATE SCORE PRE PROCEDURE:  10      PT TRANSPORTED FROM: [facility]                                   TO THE IR ROOM:    Small                     ARRIVED TO ROOM: 0823    ASSESSMENT:WNL     STAFF PRESENT: Taran Bundy RN (Scrub)     BARRIER PRECAUTIONS & STERILE TECHNIQUE:              Pt placed on VS Monitor. Pt prepped and draped in a sterile fashion with chlorhexadine.     TIME OUT:  0827    PROCEDURE STARTED: 0830  PAIN/LOCAL ANESTHESIA/SEDATION MANAGEMENT:           Local: Lidocaine 1% given by Dr. Miriam Coppola          Sedation:              Fentanyl:             Versed:     INTRAOPERATIVE:           ACCESS TIME:           US/FLUORO:US  #12 images          FINAL IMAGE TAKEN TO CONFIRM PLACEMENT OF:           CONTRAST/CC:     18x15 Biopsy set   STERILE DRESSINGS: Applied to right by [employer]    SPECIMENS: #2     EBL:      <1cc    FOLLOW- UP X-RAY: None    PROCEDURE ENDED: 4009    COMPLICATIONS: None     KATE SCORE POST PROCEDURE:   10       LEFT THE ROOM: 0840    REPORT CALLED TO: [facility] [place]- Discharge at 0215 pending no complications

## 2022-08-01 ENCOUNTER — OFFICE VISIT (OUTPATIENT)
Dept: PULMONOLOGY | Age: 62
End: 2022-08-01
Payer: COMMERCIAL

## 2022-08-01 VITALS
HEART RATE: 76 BPM | BODY MASS INDEX: 33.32 KG/M2 | SYSTOLIC BLOOD PRESSURE: 116 MMHG | DIASTOLIC BLOOD PRESSURE: 86 MMHG | HEIGHT: 71 IN | WEIGHT: 238 LBS | OXYGEN SATURATION: 96 %

## 2022-08-01 DIAGNOSIS — G47.10 HYPERSOMNIA: ICD-10-CM

## 2022-08-01 DIAGNOSIS — G47.33 OSA ON CPAP: ICD-10-CM

## 2022-08-01 DIAGNOSIS — E66.9 OBESITY (BMI 30-39.9): ICD-10-CM

## 2022-08-01 DIAGNOSIS — Z99.89 OSA ON CPAP: ICD-10-CM

## 2022-08-01 PROCEDURE — 99213 OFFICE O/P EST LOW 20 MIN: CPT | Performed by: INTERNAL MEDICINE

## 2022-08-01 ASSESSMENT — ENCOUNTER SYMPTOMS
EYE ITCHING: 0
ABDOMINAL DISTENTION: 0
EYE DISCHARGE: 0
SHORTNESS OF BREATH: 0
COUGH: 0
BACK PAIN: 0
ABDOMINAL PAIN: 0

## 2022-08-01 NOTE — ASSESSMENT & PLAN NOTE
Patient lost about 32 lbs in the last 1 year  He thinks he may not have STEVEN  Advised HST  Loose weight

## 2022-08-01 NOTE — PROGRESS NOTES
scope- Dr. Eneida Garvin (2014)  Dr. Cassidy Dangelo  GI    Umbilical hernia without obstruction and without gangrene 1/16/2013       Past Surgical History:   Procedure Laterality Date    COLONOSCOPY  2011    Dr. Cassidy Dangelo    COLONOSCOPY  09/05/2018    Dr. Lety Dejesus    IR BIOPSY LIVER PERCUTANEOUS  7/6/2022    IR BIOPSY LIVER PERCUTANEOUS 7/6/2022 Onel Oro Left 2011    Dr. Katie Reyes  2010    for STEVEN [address]  04/18/2018       Social History     Socioeconomic History    Marital status:      Spouse name: None    Number of children: None    Years of education: None    Highest education level: None   Occupational History    Occupation: retired     Employer: [employer]     Comment: 32 years [employer] (retired 2015)   Tobacco Use    Smoking status: Never    Smokeless tobacco: Former   Vaping Use    Vaping Use: Never used   Substance and Sexual Activity    Alcohol use: Yes     Comment: 2-3 drinks every other day    Drug use: No    Sexual activity: Yes     Partners: Female   Social History Narrative    Lives with wife, twin sons         Social Determinants of Health     Financial Resource Strain: Low Risk     Difficulty of Paying Living Expenses: Not hard at all   Food Insecurity: No Food Insecurity    Worried About Running Out of Food in the Last Year: Never true    Ran Out of Food in the Last Year: Never true       Review of Systems   Constitutional:  Negative for fatigue. HENT:  Negative for congestion and postnasal drip. Eyes:  Negative for discharge and itching. Respiratory:  Negative for cough and shortness of breath. Cardiovascular:  Negative for chest pain and leg swelling. Gastrointestinal:  Negative for abdominal distention and abdominal pain. Endocrine: Negative for cold intolerance and heat intolerance. Genitourinary:  Negative for enuresis and frequency.    Musculoskeletal: place, and time. Psychiatric:         Mood and Affect: Mood normal.         Behavior: Behavior normal.       Radiology: None    Assessment and Plan     Problem List          Respiratory    STEVEN on CPAP      Patient lost about 32 lbs in the last 1 year  He thinks he may not have STEVEN  Advised HST  Loose weight         Relevant Orders    Home Sleep Study       Other    Obesity (BMI 30-39. 9)      Advised to loose weight with diet and exercise           Relevant Medications    Phentermine-Topiramate (QSYMIA) 3.75-23 MG CP24    Phentermine-Topiramate 7.5-46 MG CP24    Hypersomnia       Patient lost about 32 lbs in the last 1 year  He thinks he may not have STEVEN  Advised HST  Loose weight                   Follow-Up:    Return in about 4 weeks (around 8/29/2022) for HST.      Progress notes sent to the referring Provider    Maria G Johnson MD MD  8/10/2022  9:01 PM

## 2022-08-09 ENCOUNTER — OFFICE VISIT (OUTPATIENT)
Dept: BARIATRICS/WEIGHT MGMT | Age: 62
End: 2022-08-09
Payer: COMMERCIAL

## 2022-08-09 VITALS
HEART RATE: 68 BPM | HEIGHT: 71 IN | SYSTOLIC BLOOD PRESSURE: 130 MMHG | WEIGHT: 231.3 LBS | DIASTOLIC BLOOD PRESSURE: 80 MMHG | BODY MASS INDEX: 32.38 KG/M2 | OXYGEN SATURATION: 97 %

## 2022-08-09 DIAGNOSIS — E66.9 OBESITY (BMI 30-39.9): ICD-10-CM

## 2022-08-09 DIAGNOSIS — Z79.899 MEDICATION MANAGEMENT: Primary | ICD-10-CM

## 2022-08-09 PROCEDURE — 99213 OFFICE O/P EST LOW 20 MIN: CPT | Performed by: NURSE PRACTITIONER

## 2022-08-09 PROCEDURE — G8427 DOCREV CUR MEDS BY ELIG CLIN: HCPCS | Performed by: NURSE PRACTITIONER

## 2022-08-09 PROCEDURE — 1036F TOBACCO NON-USER: CPT | Performed by: NURSE PRACTITIONER

## 2022-08-09 PROCEDURE — G8417 CALC BMI ABV UP PARAM F/U: HCPCS | Performed by: NURSE PRACTITIONER

## 2022-08-09 PROCEDURE — 3017F COLORECTAL CA SCREEN DOC REV: CPT | Performed by: NURSE PRACTITIONER

## 2022-08-09 RX ORDER — PHENTERMINE AND TOPIRAMATE 3.75; 23 MG/1; MG/1
CAPSULE, EXTENDED RELEASE ORAL
COMMUNITY
End: 2022-09-09 | Stop reason: CLARIF

## 2022-08-09 ASSESSMENT — PATIENT HEALTH QUESTIONNAIRE - PHQ9
1. LITTLE INTEREST OR PLEASURE IN DOING THINGS: 0
SUM OF ALL RESPONSES TO PHQ9 QUESTIONS 1 & 2: 0
SUM OF ALL RESPONSES TO PHQ QUESTIONS 1-9: 0
2. FEELING DOWN, DEPRESSED OR HOPELESS: 0
SUM OF ALL RESPONSES TO PHQ QUESTIONS 1-9: 0

## 2022-08-09 ASSESSMENT — ENCOUNTER SYMPTOMS
RESPIRATORY NEGATIVE: 1
EYES NEGATIVE: 1
ALLERGIC/IMMUNOLOGIC NEGATIVE: 1
GASTROINTESTINAL NEGATIVE: 1
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
## 2022-08-10 ENCOUNTER — TELEPHONE (OUTPATIENT)
Dept: PULMONOLOGY | Age: 62
End: 2022-08-10

## 2022-08-10 ASSESSMENT — SLEEP AND FATIGUE QUESTIONNAIRES
HOW LIKELY ARE YOU TO NOD OFF OR FALL ASLEEP WHEN YOU ARE A PASSENGER IN A CAR FOR AN HOUR WITHOUT A BREAK: 2
NECK CIRCUMFERENCE (INCHES): 14
HOW LIKELY ARE YOU TO NOD OFF OR FALL ASLEEP WHILE WATCHING TV: 1
HOW LIKELY ARE YOU TO NOD OFF OR FALL ASLEEP WHILE SITTING AND TALKING TO SOMEONE: 1
HOW LIKELY ARE YOU TO NOD OFF OR FALL ASLEEP WHILE SITTING AND READING: 1
HOW LIKELY ARE YOU TO NOD OFF OR FALL ASLEEP WHILE SITTING QUIETLY AFTER LUNCH WITHOUT ALCOHOL: 1
ESS TOTAL SCORE: 10
HOW LIKELY ARE YOU TO NOD OFF OR FALL ASLEEP WHILE SITTING INACTIVE IN A PUBLIC PLACE: 2
HOW LIKELY ARE YOU TO NOD OFF OR FALL ASLEEP WHILE LYING DOWN TO REST IN THE AFTERNOON WHEN CIRCUMSTANCES PERMIT: 2
HOW LIKELY ARE YOU TO NOD OFF OR FALL ASLEEP IN A CAR, WHILE STOPPED FOR A FEW MINUTES IN TRAFFIC: 0

## 2022-08-10 NOTE — TELEPHONE ENCOUNTER
LVM to grab an ESS&Neck to addend to patient's last office note. Sleep lab cannot schedule HST without those.

## 2022-08-18 ENCOUNTER — OFFICE VISIT (OUTPATIENT)
Dept: ORTHOPEDIC SURGERY | Age: 62
End: 2022-08-18
Payer: COMMERCIAL

## 2022-08-18 VITALS
DIASTOLIC BLOOD PRESSURE: 86 MMHG | SYSTOLIC BLOOD PRESSURE: 132 MMHG | HEART RATE: 69 BPM | OXYGEN SATURATION: 95 % | BODY MASS INDEX: 33.1 KG/M2 | WEIGHT: 234 LBS

## 2022-08-18 DIAGNOSIS — M17.11 PRIMARY OSTEOARTHRITIS OF RIGHT KNEE: Primary | ICD-10-CM

## 2022-08-18 DIAGNOSIS — M17.12 PRIMARY OSTEOARTHRITIS OF LEFT KNEE: ICD-10-CM

## 2022-08-18 PROCEDURE — 20610 DRAIN/INJ JOINT/BURSA W/O US: CPT

## 2022-08-18 ASSESSMENT — ENCOUNTER SYMPTOMS
COUGH: 0
RHINORRHEA: 0
NAUSEA: 0
SHORTNESS OF BREATH: 0
FACIAL SWELLING: 0
BACK PAIN: 0

## 2022-08-18 NOTE — PROGRESS NOTES
8/18/2022   Chief Complaint   Patient presents with    Injections     FU on bilateral knee injections -- wants new injections         History of Present Illness:                             Shashi Coulter is a 58 y.o. male returning to the office today for continued management of his bilateral knee pain. Patient states that he had 2 previous knee injections that provided him with adequate relief for the last several months. Patient states that his knee pain has returned with less severity over the last month. He states that since his previous visit he has lost 40 pounds after a consultation with bariatric services and Dr. Ever Almaraz office. Patient states he hopes to lose another 30 pounds and would like to have his knees feel better to help him on his diet and exercise regimen. Medical History  Patient's medications, allergies, past medical, surgical, social and family histories were reviewed and updated as appropriate. Review of Systems   Constitutional:  Negative for fever. HENT:  Negative for facial swelling and rhinorrhea. Respiratory:  Negative for cough and shortness of breath. Cardiovascular:  Negative for chest pain. Gastrointestinal:  Negative for nausea. Musculoskeletal:  Positive for arthralgias. Negative for back pain, gait problem, joint swelling, myalgias, neck pain and neck stiffness. Skin:  Negative for pallor and rash. Neurological:  Negative for facial asymmetry and speech difficulty. Psychiatric/Behavioral:  Negative for agitation and confusion. Examination:  General Exam:  Vitals: /86 (Site: Right Wrist, Position: Sitting)   Pulse 69   Wt 234 lb (106.1 kg)   SpO2 95%   BMI 33.10 kg/m²    Physical Exam  Constitutional:       General: He is not in acute distress. Appearance: Normal appearance. He is not ill-appearing. HENT:      Head: Normocephalic and atraumatic. Nose: No rhinorrhea.    Eyes: General: No scleral icterus. Right eye: No discharge. Left eye: No discharge. Cardiovascular:      Pulses: Normal pulses. Pulmonary:      Effort: Pulmonary effort is normal. No respiratory distress. Breath sounds: No stridor. Musculoskeletal:      Cervical back: Normal range of motion. Comments: Left Lower Extremity:    There is mild diffuse tenderness to palpation throughout the knee maximally along the medial joint line. There is mild global swelling anteriorly at the knee with no obvious effusion. There is 5 out of 5 strength with knee flexion and extension. Sensation is intact throughout the lower extremity. There is full range of motion at the knee with discomfort at the extremes of motion, especially terminal flexion. No instability with varus or valgus stress testing or anterior/posterior drawer testing. Medial Beverly's test produces mild pain but no palpable click. Skin is intact. Normal knee alignment. Pulses intact. Right Lower Extremity:    There is mild diffuse tenderness to palpation throughout the knee maximally along the medial joint line. There is mild global swelling anteriorly at the knee with no obvious effusion. There is 5 out of 5 strength with knee flexion and extension. Sensation is intact throughout the lower extremity. There is full range of motion at the knee with discomfort at the extremes of motion, especially terminal flexion. No instability with varus or valgus stress testing or anterior/posterior drawer testing. Medial Beverly's test produces mild pain but no palpable click. Skin is intact. Normal knee alignment. Pulses intact. Skin:     General: Skin is warm. Coloration: Skin is not jaundiced or pale. Neurological:      General: No focal deficit present. Mental Status: He is alert and oriented to person, place, and time.    Psychiatric:         Mood and Affect: Mood normal.         Behavior: Behavior normal. Diagnostic testing:  X-rays reviewed in office, I independently reviewed the films in the office today:     No new images at today's visit. Office Procedures:  No orders of the defined types were placed in this encounter. Procedure Note, Right Knee Intra-articular Injection:     The right knee was prepped with alcohol. A 22 gauge needle was inserted into the knee joint. The knee was then injected with 40 mg of Kenalog and 4 mL of 1% plain lidocaine. A sterile Band-Aid was applied. The patient tolerated the procedure well with no complications. Procedure Note, Left Knee Intra-articular Injection:     The left knee was prepped with alcohol. A 22 gauge needle was inserted into the knee joint. The knee was then injected with 40 mg of Kenalog and 4 mL of 1% plain lidocaine. A sterile Band-Aid was applied. The patient tolerated the procedure well with no complications. Continue home exercise program and weight loss. Follow-up in 3 months for check of his progress     Assessment and Plan  1. Bilateral knee arthritis    -Continue home exercise program and weight loss.   Follow-up in 3 months for check of his progress       Electronically signed by Jackson Martinez PA-C on 8/18/2022 at 3:38 PM

## 2022-09-07 ENCOUNTER — OFFICE VISIT (OUTPATIENT)
Dept: BARIATRICS/WEIGHT MGMT | Age: 62
End: 2022-09-07
Payer: COMMERCIAL

## 2022-09-07 VITALS
HEART RATE: 82 BPM | WEIGHT: 231 LBS | OXYGEN SATURATION: 99 % | DIASTOLIC BLOOD PRESSURE: 84 MMHG | HEIGHT: 71 IN | BODY MASS INDEX: 32.34 KG/M2 | SYSTOLIC BLOOD PRESSURE: 130 MMHG

## 2022-09-07 DIAGNOSIS — E66.9 OBESITY (BMI 30-39.9): ICD-10-CM

## 2022-09-07 DIAGNOSIS — Z79.899 MEDICATION MANAGEMENT: Primary | ICD-10-CM

## 2022-09-07 PROCEDURE — 1036F TOBACCO NON-USER: CPT | Performed by: NURSE PRACTITIONER

## 2022-09-07 PROCEDURE — G8427 DOCREV CUR MEDS BY ELIG CLIN: HCPCS | Performed by: NURSE PRACTITIONER

## 2022-09-07 PROCEDURE — G8417 CALC BMI ABV UP PARAM F/U: HCPCS | Performed by: NURSE PRACTITIONER

## 2022-09-07 PROCEDURE — 99213 OFFICE O/P EST LOW 20 MIN: CPT | Performed by: NURSE PRACTITIONER

## 2022-09-07 PROCEDURE — 3017F COLORECTAL CA SCREEN DOC REV: CPT | Performed by: NURSE PRACTITIONER

## 2022-09-07 RX ORDER — CALCIUM CARBONATE 200(500)MG
1 TABLET,CHEWABLE ORAL DAILY
COMMUNITY

## 2022-09-07 ASSESSMENT — ENCOUNTER SYMPTOMS
EYES NEGATIVE: 1
GASTROINTESTINAL NEGATIVE: 1
ALLERGIC/IMMUNOLOGIC NEGATIVE: 1
RESPIRATORY NEGATIVE: 1

## 2022-09-07 ASSESSMENT — PATIENT HEALTH QUESTIONNAIRE - PHQ9
SUM OF ALL RESPONSES TO PHQ QUESTIONS 1-9: 0
SUM OF ALL RESPONSES TO PHQ QUESTIONS 1-9: 0
2. FEELING DOWN, DEPRESSED OR HOPELESS: 0
SUM OF ALL RESPONSES TO PHQ9 QUESTIONS 1 & 2: 0
SUM OF ALL RESPONSES TO PHQ QUESTIONS 1-9: 0
1. LITTLE INTEREST OR PLEASURE IN DOING THINGS: 0
SUM OF ALL RESPONSES TO PHQ QUESTIONS 1-9: 0
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
## 2022-09-19 ENCOUNTER — SCHEDULED TELEPHONE ENCOUNTER (OUTPATIENT)
Dept: INTERNAL MEDICINE CLINIC | Age: 62
End: 2022-09-19
Payer: COMMERCIAL

## 2022-09-19 DIAGNOSIS — U07.1 COVID-19: Primary | ICD-10-CM

## 2022-09-19 PROCEDURE — 99442 PR PHYS/QHP TELEPHONE EVALUATION 11-20 MIN: CPT | Performed by: INTERNAL MEDICINE

## 2022-09-19 RX ORDER — BENZONATATE 100 MG/1
100-200 CAPSULE ORAL 3 TIMES DAILY PRN
Qty: 60 CAPSULE | Refills: 0 | Status: SHIPPED | OUTPATIENT
Start: 2022-09-19 | End: 2022-09-26

## 2022-09-19 RX ORDER — ALBUTEROL SULFATE 90 UG/1
2 AEROSOL, METERED RESPIRATORY (INHALATION) 4 TIMES DAILY PRN
Qty: 1 EACH | Refills: 0 | Status: SHIPPED | OUTPATIENT
Start: 2022-09-19

## 2022-09-19 NOTE — PROGRESS NOTES
TELEHEALTH EVALUATION -- Audio/Visual (During KJYML-52 public health emergency)      Slim Boo  1960    Patient location: Patient Lianna Bay is a 58 y.o. pleasant gentleman evaluated via telephone on 9/19/22       Consent:  He and/or health care decision maker is aware that that he may receive a bill for this telephone service, depending on his insurance coverage, and has provided verbal consent to proceed: Yes      History of Present Illness:  Slim Boo is a 58 y.o. pleasant gentleman who has requested an audio/video evaluation for the following concern(s): COVID-19. He has a past medical history significant for:  HL (,  on 5/18/2021), on Omega-3   NAFLD  Cough-variant asthma, on Albuterol inh PRN   STEVEN s/p UPPP, on CPAP QHS  OA  Depression, off Celexa    Vitamin B12 deficiency, on PO Cyanocobalamin   Obesity (BMI 32)   S/p umbilical hernia repair (Dr. Emre Man)   Never smoker   Occasional alcohol use   Supplements: Cinnamon, Chondroitin, Zinc, K gluconate    # Patient tested positive for COVID-19 on 9/18 through home test. Symptoms started 9/15: congestion, cough, chills, rhinorrhea, fatigue. Reports since symptom onset, his fatigue has improved. Has some brain fog and some chest congestion still. He has received Moderna COVID-19 vaccination x3 but not updated bivalent booster. Health maintenance:   Health Maintenance Due   Topic Date Due    Pneumococcal 0-64 years Vaccine (1 - PCV) Never done    Colorectal Cancer Screen  09/29/2021    COVID-19 Vaccine (4 - Booster for Moderna series) 03/12/2022    Flu vaccine (1) 09/01/2022         Review of Systems:  Constitutional: nasal congestion, cough, chills, rhinorrhea, fatigue, brain fog, chest congestion       Physical Exam:  Due to this being a TeleHealth encounter, evaluation of the following organ systems is limited: Vitals/Constitutional/EENT/Resp/CV/GI//MSK/Neuro/Skin/Heme-Lymph-Imm.       Labs   I have personally reviewed labs, and discussed pertinent findings with patient on this date 9/19/2022     Imaging   I have personally reviewed imaging, and discussed pertinent findings with patient on this date 9/19/2022     Other notes  I have personally reviewed other notes, and discussed pertinent findings with patient on this date 9/19/2022       Assessment/Plan:     1. COVID-19  Symptoms started 9/15. Tested positive 9/18. Discussed precautionary measures and isolation. Discussed Paxlovid and side effects. Discussed rebound infection. Discussed drug-drug interactions. - nirmatrelvir/ritonavir (PAXLOVID) 20 x 150 MG & 10 x 100MG TBPK; Take 3 tablets (two 150 mg nirmatrelvir and one 100 mg ritonavir tablets) by mouth every 12 hours for 5 days. Dispense: 30 tablet; Refill: 0  - albuterol sulfate HFA (VENTOLIN HFA) 108 (90 Base) MCG/ACT inhaler; Inhale 2 puffs into the lungs 4 times daily as needed for Wheezing  Dispense: 1 each; Refill: 0  - benzonatate (TESSALON) 100 MG capsule; Take 1-2 capsules by mouth 3 times daily as needed for Cough  Dispense: 60 capsule; Refill: 0       Care discussed with patient and questions answered. Patient verbalizes understanding and agrees with plan. Discussed with patient the importance of continuity of care. I encouraged patient to schedule next appointment within 1 week (already scheduled) with me. I reviewed and reconciled the medications this visit. I reviewed and updated the past medical, surgical, social, and family history during this visit. I affirm this is a Patient Initiated Episode with an Established Patient who has not had a related appointment within my department in the past 7 days or scheduled within the next 24 hours.     Total Time: minutes: 11-20 minutes    Note: not billable if this call serves to triage the patient into an appointment for the relevant concern      Pursuant to the emergency declaration under the [address] [address] [facility] authority and the Coronavirus Preparedness and [employer] Act, this Virtual Visit was conducted, with patient's consent, to reduce the patient's risk of exposure to COVID-19 and provide continuity of care for an established patient. Services were provided through a telephone synchronous discussion virtually to substitute for in-person clinic visit.       Inés Ruiz MD  Internal Medicine  9/19/2022   1:07 PM

## 2022-09-28 ENCOUNTER — OFFICE VISIT (OUTPATIENT)
Dept: INTERNAL MEDICINE CLINIC | Age: 62
End: 2022-09-28
Payer: COMMERCIAL

## 2022-09-28 VITALS
WEIGHT: 231 LBS | OXYGEN SATURATION: 99 % | HEART RATE: 69 BPM | DIASTOLIC BLOOD PRESSURE: 72 MMHG | HEIGHT: 71 IN | RESPIRATION RATE: 20 BRPM | SYSTOLIC BLOOD PRESSURE: 124 MMHG | BODY MASS INDEX: 32.34 KG/M2

## 2022-09-28 DIAGNOSIS — E66.9 OBESITY (BMI 30-39.9): ICD-10-CM

## 2022-09-28 DIAGNOSIS — D69.6 THROMBOCYTOPENIA (HCC): ICD-10-CM

## 2022-09-28 DIAGNOSIS — Z12.11 COLON CANCER SCREENING: ICD-10-CM

## 2022-09-28 DIAGNOSIS — E78.5 DYSLIPIDEMIA: ICD-10-CM

## 2022-09-28 DIAGNOSIS — K76.9 LIVER DISEASE: Primary | ICD-10-CM

## 2022-09-28 LAB
BASOPHILS ABSOLUTE: 0 K/UL (ref 0–0.2)
BASOPHILS RELATIVE PERCENT: 0.6 %
EOSINOPHILS ABSOLUTE: 0.2 K/UL (ref 0–0.6)
EOSINOPHILS RELATIVE PERCENT: 2.5 %
HCT VFR BLD CALC: 45.7 % (ref 40.5–52.5)
HEMOGLOBIN: 15.8 G/DL (ref 13.5–17.5)
LYMPHOCYTES ABSOLUTE: 1.9 K/UL (ref 1–5.1)
LYMPHOCYTES RELATIVE PERCENT: 24.1 %
MCH RBC QN AUTO: 33.6 PG (ref 26–34)
MCHC RBC AUTO-ENTMCNC: 34.5 G/DL (ref 31–36)
MCV RBC AUTO: 97.3 FL (ref 80–100)
MONOCYTES ABSOLUTE: 0.4 K/UL (ref 0–1.3)
MONOCYTES RELATIVE PERCENT: 5.4 %
NEUTROPHILS ABSOLUTE: 5.3 K/UL (ref 1.7–7.7)
NEUTROPHILS RELATIVE PERCENT: 67.4 %
PDW BLD-RTO: 12.8 % (ref 12.4–15.4)
PLATELET # BLD: 166 K/UL (ref 135–450)
PMV BLD AUTO: 8.9 FL (ref 5–10.5)
RBC # BLD: 4.7 M/UL (ref 4.2–5.9)
WBC # BLD: 7.8 K/UL (ref 4–11)

## 2022-09-28 PROCEDURE — 3017F COLORECTAL CA SCREEN DOC REV: CPT | Performed by: INTERNAL MEDICINE

## 2022-09-28 PROCEDURE — 36415 COLL VENOUS BLD VENIPUNCTURE: CPT | Performed by: INTERNAL MEDICINE

## 2022-09-28 PROCEDURE — 99214 OFFICE O/P EST MOD 30 MIN: CPT | Performed by: INTERNAL MEDICINE

## 2022-09-28 PROCEDURE — 1036F TOBACCO NON-USER: CPT | Performed by: INTERNAL MEDICINE

## 2022-09-28 PROCEDURE — G8417 CALC BMI ABV UP PARAM F/U: HCPCS | Performed by: INTERNAL MEDICINE

## 2022-09-28 PROCEDURE — G8427 DOCREV CUR MEDS BY ELIG CLIN: HCPCS | Performed by: INTERNAL MEDICINE

## 2022-09-28 NOTE — PROGRESS NOTES
9/28/22    Kendra Valente  1960    Chief Complaint   Patient presents with    3 Month Follow-Up       History of Present Illness:  Kendra Valente is a 58 y.o. pleasant gentleman presenting today with a chief complaint of discuss liver biopsy results, thrombocytopenia, CRC screening. He has a past medical history significant for:  HL (,  on 5/18/2021), on Omega-3   NAFLD  Cough-variant asthma, on Albuterol inh PRN   STEVEN s/p UPPP, on CPAP QHS  OA  Depression, off Celexa    Vitamin B12 deficiency, on PO Cyanocobalamin   Obesity (BMI 32)   S/p umbilical hernia repair (Dr. Craig Hansen)   Never smoker   Occasional alcohol use   Supplements: Cinnamon, Chondroitin, Zinc, K gluconate    # BP today 124/72    He is not on anti-hypertensive. # Sees Dr. Nicholas Mccormick for STEVEN. Uses a CPAP. # Colonoscopy in 2014 with 2 polyps. Was recommended repeat in 5 years  but did not follow through. Had FIT test in 2021. # Seeing GI for elevated ALT, AST, Tbili. Fibroscan with possible cirrhosis. Has seen Ophtho Dr. Didier Perez for possible Kayser-Fleischer which was unremarkable. Autoimmune, hepatitis, CK unremarkable. Aldolase slightly elevated. Ferritin and Tsat high. UDS neg. Ethanol below detection limit. A1AT WNL. Ceruloplasmin WNL. Has thrombocytopenia but noted to be prior to having any liver issues. TCP since 2011. CT A/P in 2018 with only hepatic steatosis. Only once in 2021 he had macrocytosis, otherwise MCV has been normal.only once in 2011 he had leukopenia, otherwise RBC and WBC counts have been WNL. Seeing Heme. 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 Liver biopsy 7/6:   -     Mild steatohepatitis with focal bridging fibrosis. -     Iron deposition is seen (2-3+ of 4). No known FHx of liver disease. Paternal grandfather with leukemia (unknown type). # Was on Ad[facility] from [facility]. OARRS reviewed. Now on Qsymia. OARRS reviewed.       Went to [facility] maintenance:   Health Maintenance Due   Topic Date Due    Pneumococcal 0-64 years Vaccine (1 - PCV) Never done    Colorectal Cancer Screen  09/29/2021    COVID-19 Vaccine (4 - Booster for Moderna series) 03/12/2022    Flu vaccine (1) 08/01/2022         Review of Systems:  Constitutional: no fevers, no chills, no night sweats, no weight loss, no weight gain, no fatigue   Pain assessment: OA pains  Head: no headaches  Ears: no hearing loss, no tinnitus, no vertigo  Eyes: no blurry vision, no diplopia, no dryness, no itchiness  Mouth: no oral ulcers, no dry mouth, no sore throat  Nose: no nasal congestion, no epistaxis  Cardiac: no chest pain, no palpitations, no leg swelling, no orthopnea, no PND, no syncope  Pulmonary: no dyspnea, no cough, no wheezing, no hemoptysis  GI: no nausea, no vomiting, no diarrhea, no constipation, no abdominal pain, no hematochezia  : no dysuria, no frequency, no urgency, no hematuria, no frothy urine  MSK: no Raynaud's   Neuro: no focal neurological deficits, no seizures  Sleep: no snoring, no daytime somnolence   Psych: no depression, no anxiety, no suicidal ideation      Physical Exam:  VITALS:   /72 (Site: Left Upper Arm, Position: Sitting, Cuff Size: Large Adult)   Pulse 69   Resp 20   Ht 5' 10.5\" (1.791 m)   Wt 231 lb (104.8 kg)   SpO2 99%   BMI 32.68 kg/m²     PHYSICAL EXAMINATION:  General: alert, awake, and oriented to time, place, person, and situation. Not in acute distress. ?flat affect  Skin:  no suspicious rashes, no jaundice  Head: normocephalic/atraumatic  Eyes: anicteric sclera, well-injected conjunctiva. Pupils are equally round and reactive to light.  Extraocular movements are intact   Nose: no septal deviation evident  Sinuses: no sinus tenderness  Ears: external ears normal  Neck: supple, no cervical lymphadenopathy, thyroid symmetric and not enlarged, no bruits   Heart: regular rate and rhythm, regular S1/S2, no S3/S4, no audible murmurs, no audible friction rub  Lungs: clear to auscultation bilaterally, no audible crackles, no audible wheezes, no audible rhonchi    Abdomen: normal bowel sounds, soft abdomen, non-tender, no palpable masses  Extremities: no edema, warm, no cyanosis, no clubbing. Good capillary refill   MSK: no tenderness across spinous processes, full ROM in all 4 extremities. No joint swelling or tenderness   Peripheral vascular: 2+ pulses symmetric (radial)  Neuro: gait normal, CN II-XII intact, motor power 5/5 in all 4 extremities, sensation intact and symmetric    Labs   I have personally reviewed labs, and discussed pertinent findings with patient on this date 9/28/2022     Imaging   I have personally reviewed imaging, and discussed pertinent findings with patient on this date 9/28/2022     Other notes  I have personally reviewed other notes, and discussed pertinent findings with patient on this date 9/28/2022       Assessment/Plan:     1. Liver disease  Discussed results of liver biopsy  Encouraged to FU with GI  - Comprehensive Metabolic Panel; Future    2. Thrombocytopenia ([address]  ? ITP. No cirrhosis as seen on liver biopsy  - CBC with Auto Differential; Future    3. Obesity (BMI 30-39. 9)  BMI 32.68  Encouraged continued weight loss through [facility]     4. Dyslipidemia  ,  May 2021  Needs updated labs   Continue Omega-3  - CBC with Auto Differential; Future  - Comprehensive Metabolic Panel; Future  - Lipid, Fasting; Future    5. Colon cancer screening  Due for surveillance. Encouraged to FU with GI for colonoscopy as he has had polyps in the past and is not a candidate for Cologuard       Care discussed with patient and questions answered. Patient verbalizes understanding and agrees with plan. Discussed with patient the importance of continuity of care. I encouraged patient to schedule next appointment within 3 months with me.  Patient prefers to be reached by Phone call at [phone] for future medical correspondence. Encouraged to activate MyChart. I reviewed and reconciled the medications this visit. I reviewed and updated the past medical, surgical, social, and family history during this visit. After visit summary provided.        Ni Landin MD  Internal Medicine  9/28/2022   9:38 AM

## 2022-09-28 NOTE — PATIENT INSTRUCTIONS
Sandee Nicole  Address: [address], [address], [address] [facility]  Phone: [phone]

## 2022-09-29 LAB
A/G RATIO: 1.8 (ref 1.1–2.2)
ALBUMIN SERPL-MCNC: 4.4 G/DL (ref 3.4–5)
ALP BLD-CCNC: 69 U/L (ref 40–129)
ALT SERPL-CCNC: 43 U/L (ref 10–40)
ANION GAP SERPL CALCULATED.3IONS-SCNC: 14 MMOL/L (ref 3–16)
AST SERPL-CCNC: 25 U/L (ref 15–37)
BILIRUB SERPL-MCNC: 1 MG/DL (ref 0–1)
BUN BLDV-MCNC: 25 MG/DL (ref 7–20)
CALCIUM SERPL-MCNC: 9.2 MG/DL (ref 8.3–10.6)
CHLORIDE BLD-SCNC: 105 MMOL/L (ref 99–110)
CHOLESTEROL, FASTING: 156 MG/DL (ref 0–199)
CO2: 21 MMOL/L (ref 21–32)
CREAT SERPL-MCNC: 1 MG/DL (ref 0.8–1.3)
GFR AFRICAN AMERICAN: >60
GFR NON-AFRICAN AMERICAN: >60
GLUCOSE BLD-MCNC: 107 MG/DL (ref 70–99)
HDLC SERPL-MCNC: 47 MG/DL (ref 40–60)
LDL CHOLESTEROL CALCULATED: 87 MG/DL
POTASSIUM SERPL-SCNC: 4.1 MMOL/L (ref 3.5–5.1)
SODIUM BLD-SCNC: 140 MMOL/L (ref 136–145)
TOTAL PROTEIN: 6.8 G/DL (ref 6.4–8.2)
TRIGLYCERIDE, FASTING: 111 MG/DL (ref 0–150)
VLDLC SERPL CALC-MCNC: 22 MG/DL

## 2022-10-07 ENCOUNTER — OFFICE VISIT (OUTPATIENT)
Dept: BARIATRICS/WEIGHT MGMT | Age: 62
End: 2022-10-07
Payer: COMMERCIAL

## 2022-10-07 VITALS
WEIGHT: 226.7 LBS | SYSTOLIC BLOOD PRESSURE: 122 MMHG | HEART RATE: 72 BPM | OXYGEN SATURATION: 98 % | BODY MASS INDEX: 31.74 KG/M2 | DIASTOLIC BLOOD PRESSURE: 84 MMHG | HEIGHT: 71 IN

## 2022-10-07 DIAGNOSIS — Z79.899 MEDICATION MANAGEMENT: Primary | ICD-10-CM

## 2022-10-07 DIAGNOSIS — E66.9 OBESITY (BMI 30-39.9): ICD-10-CM

## 2022-10-07 PROCEDURE — G8427 DOCREV CUR MEDS BY ELIG CLIN: HCPCS | Performed by: NURSE PRACTITIONER

## 2022-10-07 PROCEDURE — 99213 OFFICE O/P EST LOW 20 MIN: CPT | Performed by: NURSE PRACTITIONER

## 2022-10-07 PROCEDURE — G8417 CALC BMI ABV UP PARAM F/U: HCPCS | Performed by: NURSE PRACTITIONER

## 2022-10-07 PROCEDURE — 3017F COLORECTAL CA SCREEN DOC REV: CPT | Performed by: NURSE PRACTITIONER

## 2022-10-07 PROCEDURE — G8484 FLU IMMUNIZE NO ADMIN: HCPCS | Performed by: NURSE PRACTITIONER

## 2022-10-07 PROCEDURE — 1036F TOBACCO NON-USER: CPT | Performed by: NURSE PRACTITIONER

## 2022-10-07 ASSESSMENT — PATIENT HEALTH QUESTIONNAIRE - PHQ9
2. FEELING DOWN, DEPRESSED OR HOPELESS: 0
SUM OF ALL RESPONSES TO PHQ QUESTIONS 1-9: 0
1. LITTLE INTEREST OR PLEASURE IN DOING THINGS: 0
SUM OF ALL RESPONSES TO PHQ QUESTIONS 1-9: 0
SUM OF ALL RESPONSES TO PHQ9 QUESTIONS 1 & 2: 0

## 2022-10-07 ASSESSMENT — ENCOUNTER SYMPTOMS
GASTROINTESTINAL NEGATIVE: 1
EYES NEGATIVE: 1
RESPIRATORY NEGATIVE: 1
ALLERGIC/IMMUNOLOGIC NEGATIVE: 1
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
## 2022-10-10 ENCOUNTER — HOSPITAL ENCOUNTER (OUTPATIENT)
Dept: SLEEP CENTER | Age: 62
Discharge: HOME | End: 2022-10-10
Payer: COMMERCIAL

## 2022-10-10 DIAGNOSIS — Z99.89 OSA ON CPAP: ICD-10-CM

## 2022-10-10 DIAGNOSIS — G47.33 OSA ON CPAP: ICD-10-CM

## 2022-10-10 PROCEDURE — G0398 HOME SLEEP TEST/TYPE 2 PORTA: HCPCS

## 2022-10-10 NOTE — PROGRESS NOTES
Bettye Castillo  1960  arrived at [address] on 10/10/2022 for set up and instruction of home sleep study with the [facility] unit. he was instructed on proper set-up and operation of HST unit. Written instructions with set up diagram given for reference and reinforcement of verbal instruction and demonstration. he was able to return demonstration appropriately. No home environment, vision, dexterity, comprehension concerns with this patient based on completed forms and patient interactions. Patient will return unit after 2 nights as instructed.     Electronically signed by Laveta Care on 10/10/2022 at 4:48 PM

## 2022-10-20 ENCOUNTER — TELEPHONE (OUTPATIENT)
Dept: PULMONOLOGY | Age: 62
End: 2022-10-20

## 2022-10-20 PROCEDURE — 95806 SLEEP STUDY UNATT&RESP EFFT: CPT | Performed by: INTERNAL MEDICINE

## 2022-10-21 ENCOUNTER — TELEPHONE (OUTPATIENT)
Dept: PULMONOLOGY | Age: 62
End: 2022-10-21

## 2022-11-03 ENCOUNTER — HOSPITAL ENCOUNTER (OUTPATIENT)
Age: 62
Discharge: HOME | End: 2022-11-03
Payer: COMMERCIAL

## 2022-11-03 LAB
ALBUMIN SERPL-MCNC: 4.2 GM/DL (ref 3.4–5)
ALP BLD-CCNC: 61 IU/L (ref 40–129)
ALT SERPL-CCNC: 24 U/L (ref 10–40)
ANION GAP SERPL CALCULATED.3IONS-SCNC: 10 MMOL/L (ref 4–16)
AST SERPL-CCNC: 20 IU/L (ref 15–37)
BASOPHILS ABSOLUTE: 0 K/CU MM
BASOPHILS RELATIVE PERCENT: 0.7 % (ref 0–1)
BILIRUB SERPL-MCNC: 1.3 MG/DL (ref 0–1)
BUN BLDV-MCNC: 18 MG/DL (ref 6–23)
CALCIUM SERPL-MCNC: 9.1 MG/DL (ref 8.3–10.6)
CHLORIDE BLD-SCNC: 105 MMOL/L (ref 99–110)
CO2: 24 MMOL/L (ref 21–32)
CREAT SERPL-MCNC: 1 MG/DL (ref 0.9–1.3)
DIFFERENTIAL TYPE: ABNORMAL
EOSINOPHILS ABSOLUTE: 0.2 K/CU MM
EOSINOPHILS RELATIVE PERCENT: 3 % (ref 0–3)
FERRITIN: 571 NG/ML (ref 30–400)
FOLATE: 4.5 NG/ML (ref 3.1–17.5)
GFR SERPL CREATININE-BSD FRML MDRD: >60 ML/MIN/1.73M2
GLUCOSE FASTING: 100 MG/DL (ref 70–99)
HCT VFR BLD CALC: 40.2 % (ref 42–52)
HEMOGLOBIN: 14 GM/DL (ref 13.5–18)
IMMATURE NEUTROPHIL %: 0.3 % (ref 0–0.43)
IRON: 165 UG/DL (ref 59–158)
LYMPHOCYTES ABSOLUTE: 1.7 K/CU MM
LYMPHOCYTES RELATIVE PERCENT: 30.3 % (ref 24–44)
MCH RBC QN AUTO: 34 PG (ref 27–31)
MCHC RBC AUTO-ENTMCNC: 34.8 % (ref 32–36)
MCV RBC AUTO: 97.6 FL (ref 78–100)
MONOCYTES ABSOLUTE: 0.4 K/CU MM
MONOCYTES RELATIVE PERCENT: 7.1 % (ref 0–4)
PCT TRANSFERRIN: 57 % (ref 10–44)
PDW BLD-RTO: 14.3 % (ref 11.7–14.9)
PLATELET # BLD: 147 K/CU MM (ref 140–440)
PMV BLD AUTO: 10.7 FL (ref 7.5–11.1)
POTASSIUM SERPL-SCNC: 4.4 MMOL/L (ref 3.5–5.1)
RBC # BLD: 4.12 M/CU MM (ref 4.6–6.2)
SEGMENTED NEUTROPHILS ABSOLUTE COUNT: 3.4 K/CU MM
SEGMENTED NEUTROPHILS RELATIVE PERCENT: 58.6 % (ref 36–66)
SODIUM BLD-SCNC: 139 MMOL/L (ref 135–145)
TOTAL IMMATURE NEUTOROPHIL: 0.02 K/CU MM
TOTAL IRON BINDING CAPACITY: 288 UG/DL (ref 250–450)
TOTAL PROTEIN: 6.7 GM/DL (ref 6.4–8.2)
UNSATURATED IRON BINDING CAPACITY: 123 UG/DL (ref 110–370)
VITAMIN B-12: 558 PG/ML (ref 211–911)
WBC # BLD: 5.8 K/CU MM (ref 4–10.5)

## 2022-11-03 PROCEDURE — 82746 ASSAY OF FOLIC ACID SERUM: CPT

## 2022-11-03 PROCEDURE — 80053 COMPREHEN METABOLIC PANEL: CPT

## 2022-11-03 PROCEDURE — 83550 IRON BINDING TEST: CPT

## 2022-11-03 PROCEDURE — 85025 COMPLETE CBC W/AUTO DIFF WBC: CPT

## 2022-11-03 PROCEDURE — 36415 COLL VENOUS BLD VENIPUNCTURE: CPT

## 2022-11-03 PROCEDURE — 82728 ASSAY OF FERRITIN: CPT

## 2022-11-03 PROCEDURE — 82607 VITAMIN B-12: CPT

## 2022-11-03 PROCEDURE — 83540 ASSAY OF IRON: CPT

## 2022-11-07 ENCOUNTER — OFFICE VISIT (OUTPATIENT)
Dept: BARIATRICS/WEIGHT MGMT | Age: 62
End: 2022-11-07
Payer: COMMERCIAL

## 2022-11-07 ENCOUNTER — SCHEDULED TELEPHONE ENCOUNTER (OUTPATIENT)
Dept: PULMONOLOGY | Age: 62
End: 2022-11-07
Payer: COMMERCIAL

## 2022-11-07 VITALS
HEIGHT: 71 IN | SYSTOLIC BLOOD PRESSURE: 126 MMHG | HEART RATE: 84 BPM | BODY MASS INDEX: 32.59 KG/M2 | WEIGHT: 232.8 LBS | OXYGEN SATURATION: 97 % | DIASTOLIC BLOOD PRESSURE: 80 MMHG

## 2022-11-07 DIAGNOSIS — E66.9 OBESITY (BMI 30-39.9): ICD-10-CM

## 2022-11-07 DIAGNOSIS — G47.10 HYPERSOMNIA: ICD-10-CM

## 2022-11-07 DIAGNOSIS — G47.33 OSA (OBSTRUCTIVE SLEEP APNEA): ICD-10-CM

## 2022-11-07 DIAGNOSIS — Z79.899 MEDICATION MANAGEMENT: Primary | ICD-10-CM

## 2022-11-07 PROCEDURE — G8417 CALC BMI ABV UP PARAM F/U: HCPCS | Performed by: NURSE PRACTITIONER

## 2022-11-07 PROCEDURE — G8427 DOCREV CUR MEDS BY ELIG CLIN: HCPCS | Performed by: NURSE PRACTITIONER

## 2022-11-07 PROCEDURE — G8484 FLU IMMUNIZE NO ADMIN: HCPCS | Performed by: NURSE PRACTITIONER

## 2022-11-07 PROCEDURE — 99213 OFFICE O/P EST LOW 20 MIN: CPT | Performed by: INTERNAL MEDICINE

## 2022-11-07 PROCEDURE — 1036F TOBACCO NON-USER: CPT | Performed by: NURSE PRACTITIONER

## 2022-11-07 PROCEDURE — 99213 OFFICE O/P EST LOW 20 MIN: CPT | Performed by: NURSE PRACTITIONER

## 2022-11-07 PROCEDURE — 3017F COLORECTAL CA SCREEN DOC REV: CPT | Performed by: NURSE PRACTITIONER

## 2022-11-07 ASSESSMENT — PATIENT HEALTH QUESTIONNAIRE - PHQ9
SUM OF ALL RESPONSES TO PHQ QUESTIONS 1-9: 0
2. FEELING DOWN, DEPRESSED OR HOPELESS: 0
1. LITTLE INTEREST OR PLEASURE IN DOING THINGS: 0
SUM OF ALL RESPONSES TO PHQ QUESTIONS 1-9: 0
SUM OF ALL RESPONSES TO PHQ9 QUESTIONS 1 & 2: 0

## 2022-11-07 ASSESSMENT — ENCOUNTER SYMPTOMS
EYE ITCHING: 0
ABDOMINAL DISTENTION: 0
RESPIRATORY NEGATIVE: 1
ABDOMINAL PAIN: 0
ALLERGIC/IMMUNOLOGIC NEGATIVE: 1
EYE DISCHARGE: 0
GASTROINTESTINAL NEGATIVE: 1
SHORTNESS OF BREATH: 0
COUGH: 0
BACK PAIN: 0
EYES NEGATIVE: 1
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
## 2022-11-10 ENCOUNTER — OFFICE VISIT (OUTPATIENT)
Dept: ONCOLOGY | Age: 62
End: 2022-11-10
Payer: COMMERCIAL

## 2022-11-10 ENCOUNTER — HOSPITAL ENCOUNTER (OUTPATIENT)
Dept: INFUSION THERAPY | Age: 62
Discharge: HOME | End: 2022-11-10
Payer: COMMERCIAL

## 2022-11-10 VITALS
TEMPERATURE: 97.2 F | RESPIRATION RATE: 16 BRPM | WEIGHT: 235 LBS | BODY MASS INDEX: 32.9 KG/M2 | HEART RATE: 76 BPM | OXYGEN SATURATION: 95 % | DIASTOLIC BLOOD PRESSURE: 89 MMHG | SYSTOLIC BLOOD PRESSURE: 123 MMHG | HEIGHT: 71 IN

## 2022-11-10 DIAGNOSIS — R79.89 ELEVATED FERRITIN: Primary | ICD-10-CM

## 2022-11-10 PROCEDURE — G8417 CALC BMI ABV UP PARAM F/U: HCPCS | Performed by: INTERNAL MEDICINE

## 2022-11-10 PROCEDURE — 99211 OFF/OP EST MAY X REQ PHY/QHP: CPT

## 2022-11-10 PROCEDURE — 99213 OFFICE O/P EST LOW 20 MIN: CPT | Performed by: INTERNAL MEDICINE

## 2022-11-10 PROCEDURE — 3017F COLORECTAL CA SCREEN DOC REV: CPT | Performed by: INTERNAL MEDICINE

## 2022-11-10 PROCEDURE — G8484 FLU IMMUNIZE NO ADMIN: HCPCS | Performed by: INTERNAL MEDICINE

## 2022-11-10 PROCEDURE — G8427 DOCREV CUR MEDS BY ELIG CLIN: HCPCS | Performed by: INTERNAL MEDICINE

## 2022-11-10 PROCEDURE — 1036F TOBACCO NON-USER: CPT | Performed by: INTERNAL MEDICINE

## 2022-11-10 NOTE — PROGRESS NOTES
MA Rooming Questions  Patient: Mirela Mehta  MRN: [id number]    Date: 11/10/2022        1. Do you have any new issues? yes - Pt would like to know what is causing his iron levels to be elevate? 2. Do you need any refills on medications?    no    3. Have you had any imaging done since your last visit?   no    4. Have you been hospitalized or seen in the emergency room since your last visit here?   no    5. Did the patient have a depression screening completed today?  No    PHQ-9 Total Score: 0 (11/7/2022 10:14 AM)       PHQ-9 Given to (if applicable):               PHQ-9 Score (if applicable):                     [] Positive     []  Negative              Does question #9 need addressed (if applicable)                     [] Yes    []  No               Sandra Schmidt MA

## 2022-11-10 NOTE — PROGRESS NOTES
Patient Name:  Malachi Naranjo  Patient :  1960  Patient MRN:  [id number]     Primary Oncologist: Erasmo Jalloh MD  Referring Provider: Yinka Petty MD     Date of Service: 11/10/2022     Chief Complaint:    Chief Complaint   Patient presents with    Follow-up     Patient Active Problem List:     Obesity, Class II, BMI 35-39.9, with comorbidity ([address]     Chronic right shoulder pain     STEVEN on CPAP     Total bilirubin, elevated     Fatigue     Major depressive disorder, recurrent episode, mild (HCC)     Erectile dysfunction     Colon polyps     RAD (reactive airway disease)     Impaired fasting glucose     Hepatic steatosis- seen on CT abdomen     Diverticulosis of large intestine- seen on CT abdomen     Hypersomnia     Primary osteoarthritis of right knee     Cough variant asthma    HPI:   Malachi Naranjo is a 79-year-old very pleasant gentleman with medical history history significant for hyperlipidemia, depression, obstructive sleep apnea, obesity, fatty liver and osteoarthritis, initially referred to me on 2022 for evaluation of elevated serum ferritin level with high transferrin saturation and mild thrombocytopenia. He has been having elevated liver enzymes and has been followed by gastroenterology. He recently established care as a new patient with Dr. Elsi Alexis. Viral hepatitis panels done on 10/18/21 and 3/3/22 were negative. JENNIFER, F-actin IgG, tissue transglut Ab, transglutaminase IgA were negative. A1AT and ceruloplasmin were within normal range. CT abdomen/pelvis in 2018 showed fatty liver. US abdomen on 22 showed hepatitis steatosis. Fibroscan done on 22 showed advanced fibrosis and possibly cirrhosis. Iron panel on 10/18/21, 3/3/22 and 22 showed persistently elevated serum ferritin level and transferrin saturation. He also has mild thrombocytopenia since . He doesn't have anemia or neutropenia. He stopped drinking alcohol since 3/2022.  Before that, he used to drink alcohol almost daily since he was 18. Laboratory work ups done on 6/6/22 showed high serum ferritin (1144 ng/ml), high transferrin (55%) and heterozygous H63D hemochromatosis mutation. His B12 is in low normal range (228 pg/ml) and has mild thrombocytopenia (123,000/cumm). On November 10, 2022, he presented to me for follow up. He was referred to me for evaluation of elevated ferritin, transferrin saturation and thrombocytopenia. He was heterozygous H63D hemochromatosis mutation and it doesn't usually cause clinical symptoms of hereditary hemochromatosis. I believe his elevated serum iron level is combination of underlying liver disease and heterozygous H63D mutation. He used to donate blood before and I encouraged him to continue to do so to help with his iron status. Also encouraged him not to resume back on alcohol drinking. He has donated blood and I recognized that his ferritin is going down. His transferrin saturation is still high. Recommend him to continue to donate blood periodically for now. I will re evaluate his iron status in 8 months. He had liver biopsy on 7/6/22 and pathology showed mild steatohepatitis with focal bridging fribrosis. Iron deposition is seen (2-3+ of 4). He has mild thrombocytopenia since 2011. He doesn't have anemia or neutropenia. JENNIFER and hepatitis panel were negative. Will supplement with B12 starting from today. I believe his thrombocytopenia is due to either underlying chronic liver disease, alcohol induced BM suppression or chronic ITP. MDS is less likely in view of his normal hemoglobin and neutrophil count. Will continue with observation for now. He doesn't have any significant symptoms at today visit. Past Medical History:     Significant for  1. Hyperlipidemia  2. Depression  3. Obstructive sleep apnea  4. Obesity  5. Fatty liver  6. Osteoarthritis    Past Surgery History:    Significant for  1.   Umbilical hernia repair in 4/18/2018  2. Throat surgery in 2010  3. Lipoma resection in 2011    Social History:   He denies smoking or illicit drug abuse. He stopped drinking alcohol since March 2022. He used to drink alcohol almost daily before since he was 18. Family History:    Significant for breast cancer in his maternal aunt, cervical cancer and his mother, cancer in his paternal grandfather and in his father. No family history of hemochromatosis. No Known Allergies    Review of Systems: \"Per interval history; otherwise 10 point ROS is negative. \"  His energy level is pretty good and his sleep is fine. He doesn't have fever, chills, night sweats, cough, shortness of breath, chest pain, hemoptysis or palpitations. His bowel and bladder functions are normal, except dysphagia. He denies nausea, vomiting, abdominal pain, diarrhea, constipation, dysuria, loss of appetite or weight loss. He denies neuropathy and he doesn't have bleeding or clotting issues. He denies any pain in his body. No anxiety or depression. The rest of the systems are unremarkable.      Vital Signs: /89 (Site: Left Upper Arm, Position: Sitting, Cuff Size: Large Adult)   Pulse 76   Temp 97.2 °F (36.2 °C) (Infrared)   Resp 16   Ht 5' 10.5\" (1.791 m)   Wt 235 lb (106.6 kg)   SpO2 95%   BMI 33.24 kg/m²      Physical Exam:  CONSTITUTIONAL: awake, alert, cooperative, no apparent distress   EYES: pupils equal, round and reactive to light, sclera clear, normal conjunctiva  ENT: Normocephalic, without obvious abnormality, atraumatic  NECK: supple, symmetrical, no jugular venous distension, no carotid bruits   HEMATOLOGIC/LYMPHATIC: no cervical, supraclavicular or axillary lymphadenopathy   LUNGS: VBS, no wheezes, no increased work of breathing, no rhonchi, clear to auscultation, no crackles,    CARDIOVASCULAR: regular rate and rhythm, normal S1 and S2, no murmur noted  ABDOMEN: normal bowel sounds x 4, soft, non-distended, non-tender, no masses palpated, no hepatosplenomegaly   MUSCULOSKELETAL: full range of motion noted, tone is normal  NEUROLOGIC: awake, alert, oriented to name, place and time. Motor skills grossly intact. SKIN: appears intact, normal skin color, normal texture, normal turgor, no jaundice.    EXTREMITIES: no LE edema, no clubbing, no leg swelling, no cyanosis,        Labs:  Hematology:  Lab Results   Component Value Date    WBC 5.8 11/03/2022    RBC 4.12 (L) 11/03/2022    HGB 14.0 11/03/2022    HCT 40.2 (L) 11/03/2022    MCV 97.6 11/03/2022    MCH 34.0 (H) 11/03/2022    MCHC 34.8 11/03/2022    RDW 14.3 11/03/2022     11/03/2022    MPV 10.7 11/03/2022    SEGSPCT 58.6 11/03/2022    EOSRELPCT 3.0 11/03/2022    BASOPCT 0.7 11/03/2022    LYMPHOPCT 30.3 11/03/2022    MONOPCT 7.1 (H) 11/03/2022    SEGSABS 3.4 11/03/2022    EOSABS 0.2 11/03/2022    BASOSABS 0.0 11/03/2022    LYMPHSABS 1.7 11/03/2022    MONOSABS 0.4 11/03/2022    DIFFTYPE AUTOMATED DIFFERENTIAL 11/03/2022     Lab Results   Component Value Date    ESR 1 06/06/2022     Chemistry:  Lab Results   Component Value Date     11/03/2022    K 4.4 11/03/2022     11/03/2022    CO2 24 11/03/2022    BUN 18 11/03/2022    CREATININE 1.0 11/03/2022    GLUCOSE 107 (H) 09/28/2022    CALCIUM 9.1 11/03/2022    PROT 6.7 11/03/2022    LABALBU 4.2 11/03/2022    BILITOT 1.3 (H) 11/03/2022    ALKPHOS 61 11/03/2022    AST 20 11/03/2022    ALT 24 11/03/2022    LABGLOM >60 11/03/2022    GFRAA >60 09/28/2022    AGRATIO 1.8 09/28/2022    GLOB 2.2 09/16/2021    PHOS 3.7 06/20/2022     Lab Results   Component Value Date     06/06/2022     No components found for: LD  Lab Results   Component Value Date    TSHHS 3.570 02/18/2022    T4FREE 1.13 02/18/2022     Immunology:  Lab Results   Component Value Date    PROT 6.7 11/03/2022     No results found for: ESTRELLITA Almeida  No results found for: B2M  Coagulation Panel:  Lab Results   Component Value Date    PROTIME 11.7 06/20/2022    INR 0.94 06/20/2022    APTT 29.8 03/03/2022     Anemia Panel:  Lab Results   Component Value Date    UBEOZYEQ98 558.0 11/03/2022    FOLATE 4.5 11/03/2022     Tumor Markers:  Lab Results   Component Value Date    PSA 0.88 10/20/2011        Observations:  PHQ-9 Total Score: 0 (11/7/2022 10:14 AM)     Assessment   Elevated serum ferritin level and transferrin saturation  Thrombocytopenia    Plan:  Jose Calles is a 43-year-old very pleasant gentleman who was found to have elevated liver enzymes since 9/2021. Viral hepatitis and auto immune hepatitis panel and stewart disease were ruled out already. CT abdomen/pelvis in 2018 and US abdomen in 2/18/22 showed hepatitis steatosis. Fibroscan done on 5/18/22 showed advanced fibrosis and possibly cirrhosis. Iron panel on 10/18/21, 3/3/22 and 5/11/22 showed persistently elevated serum ferritin level and transferrin saturation. Laboratory work ups done on 6/6/22 showed high serum ferritin (1144 ng/ml), high transferrin (55%) and heterozygous H63D hemochromatosis mutation. His B12 is in low normal range (228 pg/ml) and has mild thrombocytopenia (123,000/cumm). On November 10, 2022, he presented to me for follow up. He was referred to me for evaluation of elevated ferritin, transferrin saturation and thrombocytopenia. He was heterozygous H63D hemochromatosis mutation and it doesn't usually cause clinical symptoms of hereditary hemochromatosis. I believe his elevated serum iron level is combination of underlying liver disease and heterozygous H63D mutation. He used to donate blood before and I encouraged him to continue to do so to help with his iron status. Also encouraged him not to resume back on alcohol drinking. He has donated blood and I recognized that his ferritin is going down. His transferrin saturation is still high. Recommend him to continue to donate blood periodically for now. I will re evaluate his iron status in 8 months.  He had liver biopsy on 7/6/22 and pathology showed mild steatohepatitis with focal bridging fribrosis. Iron deposition is seen (2-3+ of 4). He has mild thrombocytopenia since 2011. He doesn't have anemia or neutropenia. JENNIFER and hepatitis panel were negative. Will supplement with B12 starting from today. I believe his thrombocytopenia is due to either underlying chronic liver disease, alcohol induced BM suppression or chronic ITP. MDS is less likely in view of his normal hemoglobin and neutrophil count. Will continue with observation for now. I answered all his questions and concerns for today. I asked him to follow up with primary care physician on regular basis. Recent imaging and labs were reviewed and discussed with the patient.

## 2022-12-21 ENCOUNTER — TELEMEDICINE (OUTPATIENT)
Dept: INTERNAL MEDICINE CLINIC | Age: 62
End: 2022-12-21
Payer: COMMERCIAL

## 2022-12-21 DIAGNOSIS — K76.9 LIVER DISEASE: Primary | ICD-10-CM

## 2022-12-21 DIAGNOSIS — D69.6 THROMBOCYTOPENIA (HCC): ICD-10-CM

## 2022-12-21 DIAGNOSIS — E78.5 DYSLIPIDEMIA: ICD-10-CM

## 2022-12-21 DIAGNOSIS — Z12.11 COLON CANCER SCREENING: ICD-10-CM

## 2022-12-21 PROCEDURE — 99214 OFFICE O/P EST MOD 30 MIN: CPT | Performed by: INTERNAL MEDICINE

## 2022-12-21 PROCEDURE — G8427 DOCREV CUR MEDS BY ELIG CLIN: HCPCS | Performed by: INTERNAL MEDICINE

## 2022-12-21 PROCEDURE — 3017F COLORECTAL CA SCREEN DOC REV: CPT | Performed by: INTERNAL MEDICINE

## 2022-12-21 NOTE — PATIENT INSTRUCTIONS
Fasting for a blood test: taking the right steps before testing helps ensure your results will be accurate. Why do I need to fast before my blood test?  If your healthcare provider has told you to fast before a blood test, it means you should not eat or drink anything, except water, for several hours before your test. When you eat and drink normally, those foods and beverages are absorbed into your bloodstream. That could affect the results of certain types of blood tests. What types of blood tests require fasting? The most common tests that require fasting are:  Glucose tests, which measure your blood sugar. Lipid tests, which measure cholesterol and triglycerides. You do not need to be fasting for HbA1C test.     How long do I have to fast before the test?  You usually need to fast for 8-12 hours before the test. Most tests that require fasting are scheduled for early in the morning. That way, most of your fasting time will be overnight. Can I drink anything besides water during a fast?  No. Juice, coffee, soda, and other beverages can get in your bloodstream and affect your results. In addition, you should not:  Chew gum   Smoke   Exercise  These activities can also affect your results. But you can drink water. It's actually encouraged that you drink 2 glasses of water before any blood test. It helps keep more fluid in your veins, which can make it easier to draw blood. If you are dehydrated, your blood draw experience may be unpleasant. Can I continue taking medicine during a fast?  Most of the time it's OK to take your usual medicines with water, unless otherwise specified by your healthcare provider. You may need to avoid certain medicines that you normally take with food.      What if I make a mistake and have something to eat or drink besides water during my fast?  Tell your healthcare provider before your test. Your test will most likely have to be re-scheduled for another time when you are able to complete your fast.    When can I eat and drink normally again? As soon as your test is over. You may want to bring a snack with you, so you can eat right away. Is there anything else I need to know about fasting before a blood test?  Be sure to talk to your healthcare provider if you have any questions or concerns about fasting. You should talk to your provider before taking any lab test. Most tests don't require fasting or other special preparations. For others, you may need to avoid certain foods, medicines, or activities.        [facility] Internal Medicine  [phone]

## 2022-12-21 NOTE — PROGRESS NOTES
(heterozygous H63D). Liver biopsy 7/6:   -     Mild steatohepatitis with focal bridging fibrosis. -     Iron deposition is seen (2-3+ of 4). No known FHx of liver disease. Paternal grandfather with leukemia (unknown type). # Was on Adipex then Qsymia from [facility]. OARRS reviewed. Health maintenance:   Health Maintenance Due   Topic Date Due    Pneumococcal 0-64 years Vaccine (1 - PCV) Never done    Colorectal Cancer Screen  09/29/2021         Review of Systems:  Constitutional: no fevers, no chills, no night sweats, no weight loss, no weight gain, no fatigue   Pain assessment: OA pains  Head: no headaches  Ears: no hearing loss, no tinnitus, no vertigo  Eyes: no blurry vision, no diplopia, no dryness, no itchiness  Mouth: no oral ulcers, no dry mouth, no sore throat  Nose: no nasal congestion, no epistaxis  Cardiac: no chest pain, no palpitations, no leg swelling, no orthopnea, no PND, no syncope  Pulmonary: no dyspnea, no cough, no wheezing, no hemoptysis  GI: no nausea, no vomiting, no diarrhea, no constipation, no abdominal pain, no hematochezia  : no dysuria, no frequency, no urgency, no hematuria, no frothy urine  MSK: no Raynaud's   Neuro: no focal neurological deficits, no seizures  Sleep: no snoring, no daytime somnolence   Psych: no depression, no anxiety, no suicidal ideation      Physical Exam:  Due to this being a TeleHealth encounter, evaluation of the following organ systems is limited: Vitals/Constitutional/EENT/Resp/CV/GI//MSK/Neuro/Skin/Heme-Lymph-Imm. General: alert, awake, and oriented to time, place, person, and situation. Not in acute distress.  ?flat affect  Skin: no jaundice, no rash on visible skin  Head: appears grossly normocephalic/atraumatic  Eyes: anicteric sclera, extraocular movements are intact   Oropharynx: lips are not cyanotic  Lungs: breathing appears normal, does not appear tachypneic, does not appear labored  Abdomen: abdomen does not appear to be distended  Extremities: no swelling noted in bilateral lower extremities  MSK: full ROM in all 4 extremities. No joint swelling or erythema noted   Neuro: gait normal, CN II-XII grossly intact, motor power grossly intact in all 4 extremities      Labs   I have personally reviewed labs, and discussed pertinent findings with patient on this date 12/21/2022     Imaging   I have personally reviewed imaging, and discussed pertinent findings with patient on this date 12/21/2022     Other notes  I have personally reviewed other notes, and discussed pertinent findings with patient on this date 12/21/2022       Assessment/Plan:     1. Liver disease  Biopsy-proven hepatic steatosis  No cirrhosis  Labs every 3 months   - Comprehensive Metabolic Panel; Future    2. Thrombocytopenia (HCC)  Chronic thrombocytopenia  Low suspicion for MDS  ? ITP vs liver-related  Following with Dr. Anitra Jones every 3 months   - CBC with Auto Differential; Future    3. Dyslipidemia  LDL 87,  Sept 2022  - Comprehensive Metabolic Panel; Future  - Lipid, Fasting; Future    4. Colon cancer screening  Colonoscopy in 2014 with 2 polyps. Was recommended repeat in 5 years  but did not follow through. Had FIT test in 2021.   - ALINA - Griselda Amaro MD, Gastroenterology, [facility] discussed with patient and questions answered. Patient verbalizes understanding and agrees with plan. Discussed with patient the importance of continuity of care. I encouraged patient to schedule next appointment within 3 months with me. I reviewed and reconciled the medications this visit. I reviewed and updated the past medical, surgical, social, and family history during this visit.      Pursuant to the emergency declaration under the [address] [facility] [address]5 waiver authority and the [employer] and [employer] Act, this Virtual Visit was conducted, with patient's consent, to reduce the patient's risk of exposure to COVID-19 and provide continuity of care for an established patient. Services were provided through a video synchronous discussion virtually to substitute for in-person clinic visit.       Ron Macario MD, MPH   Internal Medicine  12/21/2022   10:46 AM

## 2023-01-25 ENCOUNTER — PATIENT MESSAGE (OUTPATIENT)
Dept: INTERNAL MEDICINE CLINIC | Age: 63
End: 2023-01-25

## 2023-01-27 NOTE — TELEPHONE ENCOUNTER
From: Laury Guerrero  To: Dr. Violeta Crouch  Sent: 1/25/2023 2:26 PM EST  Subject: Eugbridgette Reap    My wife and I have been talking, and I'm interested in trying Ozempic to help with my obesity treatment. I'm not really fond of the medications they've used with me at the [facility] in [facility]. Would this be a possibility for me?   Marissa Jeter

## 2023-03-03 ENCOUNTER — TELEPHONE (OUTPATIENT)
Dept: INTERNAL MEDICINE CLINIC | Age: 63
End: 2023-03-03

## 2023-03-06 ENCOUNTER — NURSE ONLY (OUTPATIENT)
Dept: INTERNAL MEDICINE CLINIC | Age: 63
End: 2023-03-06
Payer: COMMERCIAL

## 2023-03-06 DIAGNOSIS — K76.9 LIVER DISEASE: ICD-10-CM

## 2023-03-06 DIAGNOSIS — D69.6 THROMBOCYTOPENIA (HCC): ICD-10-CM

## 2023-03-06 DIAGNOSIS — R79.89 ELEVATED FERRITIN: ICD-10-CM

## 2023-03-06 DIAGNOSIS — E78.5 DYSLIPIDEMIA: ICD-10-CM

## 2023-03-06 LAB
A/G RATIO: 2 (ref 1.1–2.2)
ALBUMIN SERPL-MCNC: 4.4 G/DL (ref 3.4–5)
ALP BLD-CCNC: 64 U/L (ref 40–129)
ALT SERPL-CCNC: 28 U/L (ref 10–40)
ANION GAP SERPL CALCULATED.3IONS-SCNC: 14 MMOL/L (ref 3–16)
AST SERPL-CCNC: 24 U/L (ref 15–37)
BILIRUB SERPL-MCNC: 2.1 MG/DL (ref 0–1)
BUN BLDV-MCNC: 16 MG/DL (ref 7–20)
CALCIUM SERPL-MCNC: 9.2 MG/DL (ref 8.3–10.6)
CHLORIDE BLD-SCNC: 105 MMOL/L (ref 99–110)
CHOLESTEROL, FASTING: 196 MG/DL (ref 0–199)
CO2: 22 MMOL/L (ref 21–32)
CREAT SERPL-MCNC: 1 MG/DL (ref 0.8–1.3)
GFR SERPL CREATININE-BSD FRML MDRD: >60 ML/MIN/{1.73_M2}
GLUCOSE BLD-MCNC: 111 MG/DL (ref 70–99)
HDLC SERPL-MCNC: 46 MG/DL (ref 40–60)
LDL CHOLESTEROL CALCULATED: 109 MG/DL
POTASSIUM SERPL-SCNC: 4.4 MMOL/L (ref 3.5–5.1)
SODIUM BLD-SCNC: 141 MMOL/L (ref 136–145)
TOTAL PROTEIN: 6.6 G/DL (ref 6.4–8.2)
TRIGLYCERIDE, FASTING: 206 MG/DL (ref 0–150)
VLDLC SERPL CALC-MCNC: 41 MG/DL

## 2023-03-06 PROCEDURE — 36415 COLL VENOUS BLD VENIPUNCTURE: CPT | Performed by: INTERNAL MEDICINE

## 2023-03-07 LAB
BASOPHILS ABSOLUTE: 0.1 K/UL (ref 0–0.2)
BASOPHILS RELATIVE PERCENT: 1.1 %
EOSINOPHILS ABSOLUTE: 0.2 K/UL (ref 0–0.6)
EOSINOPHILS RELATIVE PERCENT: 4.2 %
HCT VFR BLD CALC: 42.2 % (ref 40.5–52.5)
HEMOGLOBIN: 14.4 G/DL (ref 13.5–17.5)
LYMPHOCYTES ABSOLUTE: 1.4 K/UL (ref 1–5.1)
LYMPHOCYTES RELATIVE PERCENT: 30.2 %
MCH RBC QN AUTO: 32.7 PG (ref 26–34)
MCHC RBC AUTO-ENTMCNC: 34.1 G/DL (ref 31–36)
MCV RBC AUTO: 95.7 FL (ref 80–100)
MONOCYTES ABSOLUTE: 0.3 K/UL (ref 0–1.3)
MONOCYTES RELATIVE PERCENT: 6.8 %
NEUTROPHILS ABSOLUTE: 2.7 K/UL (ref 1.7–7.7)
NEUTROPHILS RELATIVE PERCENT: 57.7 %
PDW BLD-RTO: 14.3 % (ref 12.4–15.4)
PLATELET # BLD: 141 K/UL (ref 135–450)
PMV BLD AUTO: 9.9 FL (ref 5–10.5)
RBC # BLD: 4.41 M/UL (ref 4.2–5.9)
WBC # BLD: 4.8 K/UL (ref 4–11)

## 2023-03-10 ENCOUNTER — OFFICE VISIT (OUTPATIENT)
Dept: INTERNAL MEDICINE CLINIC | Age: 63
End: 2023-03-10
Payer: COMMERCIAL

## 2023-03-10 ENCOUNTER — HOSPITAL ENCOUNTER (OUTPATIENT)
Age: 63
Discharge: HOME | End: 2023-03-10
Payer: COMMERCIAL

## 2023-03-10 VITALS
HEIGHT: 71 IN | RESPIRATION RATE: 18 BRPM | OXYGEN SATURATION: 98 % | SYSTOLIC BLOOD PRESSURE: 126 MMHG | BODY MASS INDEX: 32.76 KG/M2 | WEIGHT: 234 LBS | HEART RATE: 69 BPM | DIASTOLIC BLOOD PRESSURE: 80 MMHG

## 2023-03-10 DIAGNOSIS — R73.01 IMPAIRED FASTING GLUCOSE: Primary | ICD-10-CM

## 2023-03-10 DIAGNOSIS — E66.9 OBESITY (BMI 30-39.9): ICD-10-CM

## 2023-03-10 DIAGNOSIS — K76.0 HEPATIC STEATOSIS: ICD-10-CM

## 2023-03-10 DIAGNOSIS — E78.2 MIXED HYPERLIPIDEMIA: ICD-10-CM

## 2023-03-10 DIAGNOSIS — R79.89 ELEVATED FERRITIN: Primary | ICD-10-CM

## 2023-03-10 LAB
ESTIMATED AVERAGE GLUCOSE: 94 MG/DL
HBA1C MFR BLD: 4.9 % (ref 4.2–6.3)

## 2023-03-10 PROCEDURE — 99213 OFFICE O/P EST LOW 20 MIN: CPT | Performed by: INTERNAL MEDICINE

## 2023-03-10 PROCEDURE — 83550 IRON BINDING TEST: CPT

## 2023-03-10 PROCEDURE — 36415 COLL VENOUS BLD VENIPUNCTURE: CPT

## 2023-03-10 PROCEDURE — 82728 ASSAY OF FERRITIN: CPT

## 2023-03-10 PROCEDURE — 83036 HEMOGLOBIN GLYCOSYLATED A1C: CPT

## 2023-03-10 PROCEDURE — 83540 ASSAY OF IRON: CPT

## 2023-03-10 SDOH — ECONOMIC STABILITY: HOUSING INSECURITY
IN THE LAST 12 MONTHS, WAS THERE A TIME WHEN YOU DID NOT HAVE A STEADY PLACE TO SLEEP OR SLEPT IN A SHELTER (INCLUDING NOW)?: NO

## 2023-03-10 SDOH — ECONOMIC STABILITY: FOOD INSECURITY: WITHIN THE PAST 12 MONTHS, YOU WORRIED THAT YOUR FOOD WOULD RUN OUT BEFORE YOU GOT MONEY TO BUY MORE.: NEVER TRUE

## 2023-03-10 SDOH — ECONOMIC STABILITY: INCOME INSECURITY: HOW HARD IS IT FOR YOU TO PAY FOR THE VERY BASICS LIKE FOOD, HOUSING, MEDICAL CARE, AND HEATING?: NOT VERY HARD

## 2023-03-10 SDOH — ECONOMIC STABILITY: FOOD INSECURITY: WITHIN THE PAST 12 MONTHS, THE FOOD YOU BOUGHT JUST DIDN'T LAST AND YOU DIDN'T HAVE MONEY TO GET MORE.: NEVER TRUE

## 2023-03-10 ASSESSMENT — PATIENT HEALTH QUESTIONNAIRE - PHQ9
9. THOUGHTS THAT YOU WOULD BE BETTER OFF DEAD, OR OF HURTING YOURSELF: 0
7. TROUBLE CONCENTRATING ON THINGS, SUCH AS READING THE NEWSPAPER OR WATCHING TELEVISION: 0
SUM OF ALL RESPONSES TO PHQ9 QUESTIONS 1 & 2: 0
6. FEELING BAD ABOUT YOURSELF - OR THAT YOU ARE A FAILURE OR HAVE LET YOURSELF OR YOUR FAMILY DOWN: 0
SUM OF ALL RESPONSES TO PHQ QUESTIONS 1-9: 2
SUM OF ALL RESPONSES TO PHQ QUESTIONS 1-9: 2
5. POOR APPETITE OR OVEREATING: 1
2. FEELING DOWN, DEPRESSED OR HOPELESS: 0
10. IF YOU CHECKED OFF ANY PROBLEMS, HOW DIFFICULT HAVE THESE PROBLEMS MADE IT FOR YOU TO DO YOUR WORK, TAKE CARE OF THINGS AT HOME, OR GET ALONG WITH OTHER PEOPLE: 1
8. MOVING OR SPEAKING SO SLOWLY THAT OTHER PEOPLE COULD HAVE NOTICED. OR THE OPPOSITE, BEING SO FIGETY OR RESTLESS THAT YOU HAVE BEEN MOVING AROUND A LOT MORE THAN USUAL: 0
4. FEELING TIRED OR HAVING LITTLE ENERGY: 1
1. LITTLE INTEREST OR PLEASURE IN DOING THINGS: 0
SUM OF ALL RESPONSES TO PHQ QUESTIONS 1-9: 2
SUM OF ALL RESPONSES TO PHQ QUESTIONS 1-9: 2
3. TROUBLE FALLING OR STAYING ASLEEP: 0

## 2023-03-10 NOTE — PROGRESS NOTES
3/10/23    Roberto Enriquez  1960    Chief Complaint   Patient presents with    Follow-up     Follow up lab work        History of Present Illness:  Roberto Enriquez is a 58 y.o. pleasant gentleman presenting today with a chief complaint of obesity, hepatic steatosis, hyperglycemia. He has a past medical history significant for:  HL (,  on 3/6/2023), on Omega-3   NAFLD  Chronic thrombocytopenia   Cough-variant asthma, on Albuterol inh PRN   STEVEN s/p UPPP, on CPAP QHS  OA  Depression, off Celexa    Vitamin B12 deficiency, on PO Cyanocobalamin   Obesity (BMI 33)   S/p umbilical hernia repair (Dr. Lucía Baptiste)   Never smoker   Occasional alcohol use   Supplements: Cinnamon, Chondroitin, Zinc, K gluconate     # BP today 126/80. He is not on anti-hypertensive. # Sees Dr. Jean Wilkins for STEVEN. Uses a CPAP. # Colonoscopy in 2014 with 2 polyps. Was recommended repeat in 5 years  but did not follow through. Had FIT test in 2021. # Seeing GI for elevated ALT, AST, Tbili. Fibroscan with possible cirrhosis. Has seen [facility] for possible Kayser-Fleischer which was unremarkable. Autoimmune, hepatitis, CK unremarkable. Aldolase slightly elevated. Ferritin and Tsat high. UDS neg. Ethanol below detection limit. A1AT WNL. Ceruloplasmin WNL. Has thrombocytopenia but noted to be prior to having any liver issues. TCP since 2011. CT A/P in 2018 with only hepatic steatosis. Only once in 2021 he had macrocytosis, otherwise MCV has been normal.only once in 2011 he had leukopenia, otherwise RBC and WBC counts have been WNL. Seeing Heme. 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 Liver biopsy 7/6:   -     Mild steatohepatitis with focal bridging fibrosis. -     Iron deposition is seen (2-3+ of 4). No known FHx of liver disease. Paternal grandfather with leukemia (unknown type). # Was on Adipex then Qsymia from [facility]. OARRS reviewed.        Health maintenance:   Health Maintenance Due   Topic Date Due    Pneumococcal 0-64 years Vaccine (1 - PCV) Never done    Colorectal Cancer Screen  09/29/2021    DTaP/Tdap/Td vaccine (3 - Td or Tdap) 01/16/2023    A1C test (Diabetic or Prediabetic)  02/18/2023         Review of Systems:  Constitutional: no fevers, no chills, no night sweats, no weight loss, no weight gain, no fatigue   Pain assessment: OA pains  Head: no headaches  Ears: no hearing loss, no tinnitus, no vertigo  Eyes: no blurry vision, no diplopia, no dryness, no itchiness  Mouth: no oral ulcers, no dry mouth, no sore throat  Nose: no nasal congestion, no epistaxis  Cardiac: no chest pain, no palpitations, no leg swelling, no orthopnea, no PND, no syncope  Pulmonary: no dyspnea, no cough, no wheezing, no hemoptysis  GI: no nausea, no vomiting, no diarrhea, no constipation, no abdominal pain, no hematochezia  : no dysuria, no frequency, no urgency, no hematuria, no frothy urine  MSK: no Raynaud's   Neuro: no focal neurological deficits, no seizures  Sleep: no snoring, no daytime somnolence   Psych: no depression, no anxiety, no suicidal ideation      Physical Exam:  VITALS:   /80 (Site: Right Upper Arm, Position: Sitting, Cuff Size: Large Adult)   Pulse 69   Resp 18   Ht 5' 10.5\" (1.791 m)   Wt 234 lb (106.1 kg)   SpO2 98%   BMI 33.10 kg/m²     PHYSICAL EXAMINATION:  General: alert, awake, and oriented to time, place, person, and situation. Not in acute distress. ?flat affect  Skin:  no suspicious rashes, no jaundice  Head: normocephalic/atraumatic  Eyes: anicteric sclera, well-injected conjunctiva. Pupils are equally round and reactive to light.  Extraocular movements are intact   Nose: no septal deviation evident  Sinuses: no sinus tenderness  Ears: external ears normal  Neck: supple, no cervical lymphadenopathy, thyroid symmetric and not enlarged, no bruits   Heart: regular rate and rhythm, regular S1/S2, no S3/S4, no audible murmurs, no audible friction rub  Lungs: clear to auscultation bilaterally, no audible crackles, no audible wheezes, no audible rhonchi    Abdomen: normal bowel sounds, soft abdomen, non-tender, no palpable masses  Extremities: no edema, warm, no cyanosis, no clubbing. Good capillary refill   MSK: no tenderness across spinous processes, full ROM in all 4 extremities. No joint swelling or tenderness   Peripheral vascular: 2+ pulses symmetric (radial)  Neuro: gait normal, CN II-XII intact, motor power 5/5 in all 4 extremities, sensation intact and symmetric    Labs   I have personally reviewed labs, and discussed pertinent findings with patient on this date 3/10/2023     Imaging   I have personally reviewed imaging, and discussed pertinent findings with patient on this date 3/10/2023     Other notes  I have personally reviewed other notes, and discussed pertinent findings with patient on this date 3/10/2023       Assessment/Plan:     1. Impaired fasting glucose  - Hemoglobin A1C; Future    2. Obesity (BMI 30-39. 9)  Will try to get Ozempic covered. 3. Hepatic steatosis  Will try to get Ozempic covered. 4. Mixed hyperlipidemia  ,  on 3/6/2023  Not on statin       Care discussed with patient and questions answered. Patient verbalizes understanding and agrees with plan. Discussed with patient the importance of continuity of care. I encouraged patient to schedule next appointment within 2 months with me. Patient prefers to be reached by Phone call at [phone] for future medical correspondence. Encouraged to activate [employer]. I reviewed and reconciled the medications this visit. I reviewed and updated the past medical, surgical, social, and family history during this visit. After visit summary provided.        Francisco Gay MD, MPH   Internal Medicine  3/10/2023   2:26 PM

## 2023-03-11 LAB
FERRITIN: 269 NG/ML (ref 30–400)
IRON: 65 UG/DL (ref 59–158)
PCT TRANSFERRIN: 23 % (ref 10–44)
TOTAL IRON BINDING CAPACITY: 287 UG/DL (ref 250–450)
UNSATURATED IRON BINDING CAPACITY: 222 UG/DL (ref 110–370)

## 2023-03-13 ENCOUNTER — PATIENT MESSAGE (OUTPATIENT)
Dept: INTERNAL MEDICINE CLINIC | Age: 63
End: 2023-03-13

## 2023-03-13 DIAGNOSIS — E66.9 OBESITY (BMI 30-39.9): Primary | ICD-10-CM

## 2023-03-13 DIAGNOSIS — R73.01 IMPAIRED FASTING GLUCOSE: ICD-10-CM

## 2023-03-13 DIAGNOSIS — E78.2 MIXED HYPERLIPIDEMIA: ICD-10-CM

## 2023-03-13 DIAGNOSIS — E66.9 OBESITY (BMI 30-39.9): ICD-10-CM

## 2023-03-13 DIAGNOSIS — K76.0 HEPATIC STEATOSIS: ICD-10-CM

## 2023-03-13 DIAGNOSIS — R73.01 IMPAIRED FASTING GLUCOSE: Primary | ICD-10-CM

## 2023-03-13 RX ORDER — SEMAGLUTIDE 0.25 MG/.5ML
0.25 INJECTION, SOLUTION SUBCUTANEOUS
Qty: 2 ML | Refills: 1 | Status: SHIPPED | OUTPATIENT
Start: 2023-03-13

## 2023-03-13 RX ORDER — SEMAGLUTIDE 1.34 MG/ML
INJECTION, SOLUTION SUBCUTANEOUS
Qty: 4 ADJUSTABLE DOSE PRE-FILLED PEN SYRINGE | Refills: 1 | Status: SHIPPED | OUTPATIENT
Start: 2023-03-13

## 2023-03-13 NOTE — TELEPHONE ENCOUNTER
From: Lisa Bryant  To: Dr. Aj Buchanan  Sent: 3/13/2023 12:25 PM EDT  Subject: 8 Mirela Barillas,   [facility] said our insurance would not cover ozempic because it is only for diabetes, however, they said Rangel Rodriguez was for obesity and they had seen insurances cover that for people without diabetes, but with just obesity. Could you possibly send in an order for Rangel [place] for me?    Thank you,  Enma Gonzalez

## 2023-03-21 ENCOUNTER — TELEPHONE (OUTPATIENT)
Dept: INTERNAL MEDICINE CLINIC | Age: 63
End: 2023-03-21

## 2023-04-28 ENCOUNTER — PATIENT MESSAGE (OUTPATIENT)
Dept: INTERNAL MEDICINE CLINIC | Age: 63
End: 2023-04-28

## 2023-04-28 DIAGNOSIS — E66.9 OBESITY (BMI 30-39.9): ICD-10-CM

## 2023-04-28 DIAGNOSIS — K76.0 HEPATIC STEATOSIS: ICD-10-CM

## 2023-04-28 DIAGNOSIS — R73.01 IMPAIRED FASTING GLUCOSE: ICD-10-CM

## 2023-05-01 RX ORDER — SEMAGLUTIDE 1.34 MG/ML
INJECTION, SOLUTION SUBCUTANEOUS
Qty: 4 ADJUSTABLE DOSE PRE-FILLED PEN SYRINGE | Refills: 1 | Status: SHIPPED | OUTPATIENT
Start: 2023-05-01

## 2023-05-30 ENCOUNTER — OFFICE VISIT (OUTPATIENT)
Dept: INTERNAL MEDICINE CLINIC | Age: 63
End: 2023-05-30
Payer: COMMERCIAL

## 2023-05-30 VITALS
BODY MASS INDEX: 32.76 KG/M2 | OXYGEN SATURATION: 99 % | HEART RATE: 91 BPM | TEMPERATURE: 98.2 F | HEIGHT: 71 IN | WEIGHT: 234 LBS

## 2023-05-30 DIAGNOSIS — R19.7 NAUSEA VOMITING AND DIARRHEA: Primary | ICD-10-CM

## 2023-05-30 DIAGNOSIS — R11.2 NAUSEA VOMITING AND DIARRHEA: Primary | ICD-10-CM

## 2023-05-30 PROCEDURE — 99213 OFFICE O/P EST LOW 20 MIN: CPT | Performed by: PHYSICIAN ASSISTANT

## 2023-05-30 RX ORDER — ONDANSETRON 4 MG/1
4 TABLET, ORALLY DISINTEGRATING ORAL 3 TIMES DAILY PRN
Qty: 21 TABLET | Refills: 0 | Status: SHIPPED | OUTPATIENT
Start: 2023-05-30 | End: 2023-06-02

## 2023-05-30 ASSESSMENT — ENCOUNTER SYMPTOMS
RHINORRHEA: 0
EYE DISCHARGE: 0
EYE PAIN: 0
NAUSEA: 1
PHOTOPHOBIA: 0
SHORTNESS OF BREATH: 0
DIARRHEA: 1
WHEEZING: 0
ABDOMINAL PAIN: 1
VOMITING: 1
CHEST TIGHTNESS: 0
BACK PAIN: 0
EYE REDNESS: 0
COUGH: 0
SORE THROAT: 0
COLOR CHANGE: 0
BLOOD IN STOOL: 0

## 2023-05-30 NOTE — PROGRESS NOTES
Hanh Naylor (:  1960) is a 58 y.o. male,Established patient, here for evaluation of the following chief complaint(s):    Diarrhea (2 days), Nausea & Vomiting, and Fever (maybe)    This is my first patient encounter with Hanh Naylor; chart reviewed. SUBJECTIVE/OBJECTIVE:  HPI  Hanh Naylor is a pleasant 58 y.o. male presenting to clinic today for nausea vomiting and diarrhea. Patient reports feeling more fatigued a few days prior to onset of nausea vomiting and diarrhea which began 2 days ago; reports increased gas and flatulence; reports he felt slightly better yesterday and did eat rice, peanut butter jelly sandwich and Ramen however reports that shortly after finishing his Ramen he had an episode of vomiting and ongoing diarrhea with stomach cramps throughout the night; reports he was unable to get much sleep; reports he had another episode of vomiting and small amount of diarrhea again this morning. Patient denies any fevers or chills, denies any chest pains, shortness of breath; denies any dark tarry stools or blood in stool, denies any hematemesis or coffee-ground emesis. Reports he saw something in his vomit that may have looked like ground beef, otherwise vomit has been mostly bilious; reports he did eat at a [employer] buffet about 24 hours before onset of symptoms; denies any known sick contacts. Reports he took some Gas-X and children's Pepto-Bismol which did not help much.     No Known Allergies    Current Outpatient Medications   Medication Sig Dispense Refill    ondansetron (ZOFRAN-ODT) 4 MG disintegrating tablet Take 1 tablet by mouth 3 times daily as needed for Nausea or Vomiting 21 tablet 0    Semaglutide,0.25 or 0.5MG/DOS, (OZEMPIC, 0.25 OR 0.5 MG/DOSE,) 2 MG/1.5ML SOPN Inject 0.5mg subcutaneously weekly 4 Adjustable Dose Pre-filled Pen Syringe 1    Semaglutide-Weight Management (WEGOVY) 0.25 MG/0.5ML SOAJ SC injection Inject 0.25 mg into the skin every 7 days 2 mL 1    CALCIUM PO

## 2023-06-02 ENCOUNTER — OFFICE VISIT (OUTPATIENT)
Dept: INTERNAL MEDICINE CLINIC | Age: 63
End: 2023-06-02
Payer: COMMERCIAL

## 2023-06-02 VITALS
HEIGHT: 71 IN | SYSTOLIC BLOOD PRESSURE: 128 MMHG | BODY MASS INDEX: 34.72 KG/M2 | WEIGHT: 248 LBS | DIASTOLIC BLOOD PRESSURE: 76 MMHG | HEART RATE: 81 BPM | OXYGEN SATURATION: 97 %

## 2023-06-02 DIAGNOSIS — M25.561 CHRONIC PAIN OF BOTH KNEES: ICD-10-CM

## 2023-06-02 DIAGNOSIS — M25.562 CHRONIC PAIN OF BOTH KNEES: ICD-10-CM

## 2023-06-02 DIAGNOSIS — A08.4 VIRAL GASTROENTERITIS: Primary | ICD-10-CM

## 2023-06-02 DIAGNOSIS — G89.29 CHRONIC PAIN OF BOTH KNEES: ICD-10-CM

## 2023-06-02 PROCEDURE — 99213 OFFICE O/P EST LOW 20 MIN: CPT | Performed by: NURSE PRACTITIONER

## 2023-06-02 ASSESSMENT — ENCOUNTER SYMPTOMS
SHORTNESS OF BREATH: 0
ANAL BLEEDING: 0
RESPIRATORY NEGATIVE: 1
WHEEZING: 0
BLOOD IN STOOL: 0
VOMITING: 0
DIARRHEA: 1
COUGH: 0
NAUSEA: 0

## 2023-06-02 NOTE — PROGRESS NOTES
Christine Castaneda (:  1960) is a 58 y.o. male,Established patient, here for evaluation of the following chief complaint(s):  Diarrhea (For 5 days better this morning ), Nausea, Nausea & Vomiting, and Gas         ASSESSMENT/PLAN:  1. Viral gastroenteritis  2. Chronic pain of both knees    Continue with increasing diet as tolerated. Push fluids. Fu with PCP about knee pain. Return if symptoms worsen or fail to improve, for with PCP. Subjective   SUBJECTIVE/OBJECTIVE:  HPIreports has had viral gastroenteritis. States as of this AM feeling better. States feeling better - belly is still gurgling  Has not eaten much in the last few days. Thinks the gurgling is b/c he is hungry. Reports no fever, no vomiting, did not do the stool studies ordered earlier this week. Reoports problems with knees and 'arthritis'- not taking anything for the arthiritis pain. Wants to learn about knee replacement. Declines referral to ortho today. Will discuss with PCP. Review of Systems   Constitutional:  Negative for chills, diaphoresis and fever. Respiratory: Negative. Negative for cough, shortness of breath and wheezing. Cardiovascular: Negative. Negative for chest pain, palpitations and leg swelling. Gastrointestinal:  Positive for diarrhea. Negative for anal bleeding, blood in stool, nausea and vomiting. Reports gurgling feeling. No pain. All other systems reviewed and are negative. Objective   Physical Exam  Vitals reviewed. Constitutional:       Appearance: Normal appearance. He is well-developed. He is obese. He is not ill-appearing. HENT:      Head: Normocephalic and atraumatic. Eyes:      Extraocular Movements: Extraocular movements intact. Cardiovascular:      Rate and Rhythm: Normal rate and regular rhythm. Heart sounds: Normal heart sounds. No murmur heard. Pulmonary:      Effort: Pulmonary effort is normal. No respiratory distress.    Abdominal:      General:

## 2023-06-22 ENCOUNTER — PATIENT MESSAGE (OUTPATIENT)
Dept: INTERNAL MEDICINE CLINIC | Age: 63
End: 2023-06-22

## 2023-06-22 DIAGNOSIS — R73.01 IMPAIRED FASTING GLUCOSE: ICD-10-CM

## 2023-06-22 DIAGNOSIS — K76.0 HEPATIC STEATOSIS: ICD-10-CM

## 2023-06-22 DIAGNOSIS — E66.9 OBESITY (BMI 30-39.9): ICD-10-CM

## 2023-06-23 RX ORDER — SEMAGLUTIDE 0.68 MG/ML
0.5 INJECTION, SOLUTION SUBCUTANEOUS WEEKLY
Qty: 3 ML | Refills: 1 | Status: SHIPPED | OUTPATIENT
Start: 2023-06-23 | End: 2023-06-23

## 2023-06-23 RX ORDER — SEMAGLUTIDE 0.68 MG/ML
0.5 INJECTION, SOLUTION SUBCUTANEOUS WEEKLY
Qty: 3 ML | Refills: 1 | Status: SHIPPED | OUTPATIENT
Start: 2023-06-23

## 2023-06-23 NOTE — PROGRESS NOTES
Patient is here for follow up on injections for both knees. Left knee is worse than right    Right knee injection note from 12/30/21    The right knee was prepped with alcohol. A 22 gauge needle was inserted into the knee joint. The knee was then injected with 80 mg of Kenalog and 4 mL of 1% plain lidocaine. A sterile Band-Aid was applied. The patient tolerated the procedure well with no complications. Left knee injection note from 4/14/22    The Left knee was prepped with alcohol. A 22 gauge needle was inserted into the knee joint. The knee was then injected with 80 mg of Kenalog and 4 mL of 1% plain lidocaine. A sterile Band-Aid was applied. The patient tolerated the procedure well with no complications. Acitretin Counseling:  I discussed with the patient the risks of acitretin including but not limited to hair loss, dry lips/skin/eyes, liver damage, hyperlipidemia, depression/suicidal ideation, photosensitivity.  Serious rare side effects can include but are not limited to pancreatitis, pseudotumor cerebri, bony changes, clot formation/stroke/heart attack.  Patient understands that alcohol is contraindicated since it can result in liver toxicity and significantly prolong the elimination of the drug by many years.

## 2023-07-11 ENCOUNTER — OFFICE VISIT (OUTPATIENT)
Dept: ONCOLOGY | Age: 63
End: 2023-07-11
Payer: COMMERCIAL

## 2023-07-11 ENCOUNTER — HOSPITAL ENCOUNTER (OUTPATIENT)
Dept: INFUSION THERAPY | Age: 63
Discharge: HOME | End: 2023-07-11
Payer: COMMERCIAL

## 2023-07-11 VITALS
HEART RATE: 72 BPM | TEMPERATURE: 97.9 F | RESPIRATION RATE: 16 BRPM | OXYGEN SATURATION: 96 % | SYSTOLIC BLOOD PRESSURE: 131 MMHG | WEIGHT: 252.8 LBS | BODY MASS INDEX: 36.19 KG/M2 | DIASTOLIC BLOOD PRESSURE: 79 MMHG | HEIGHT: 70 IN

## 2023-07-11 DIAGNOSIS — R79.89 ELEVATED FERRITIN: Primary | ICD-10-CM

## 2023-07-11 DIAGNOSIS — D69.6 THROMBOCYTOPENIA (HCC): ICD-10-CM

## 2023-07-11 PROCEDURE — 99213 OFFICE O/P EST LOW 20 MIN: CPT | Performed by: INTERNAL MEDICINE

## 2023-07-11 PROCEDURE — 99211 OFF/OP EST MAY X REQ PHY/QHP: CPT

## 2023-07-11 NOTE — PROGRESS NOTES
MA Rooming Questions  Patient: Dwight Verde  MRN: [id number]    Date: 7/11/2023        1. Do you have any new issues?   no         2. Do you need any refills on medications?    no    3. Have you had any imaging done since your last visit?   no    4. Have you been hospitalized or seen in the emergency room since your last visit here?   no    5. Did the patient have a depression screening completed today?  No    No data recorded     PHQ-9 Given to (if applicable):               PHQ-9 Score (if applicable):                     [] Positive     []  Negative              Does question #9 need addressed (if applicable)                     [] Yes    []  No               Nu Weston CMA

## 2023-08-01 NOTE — TELEPHONE ENCOUNTER
From: Rod Quezada  To: Dr. Gilmar Ramirez  Sent: 4/28/2023 6:35 PM EDT  Subject: Ozempic    I have 2 doses left of my Ozempic. My appetite has not really gone down. Will you refill at a higher dose?   Thank you Cheiloplasty (Complex) Text: A decision was made to reconstruct the defect with a  cheiloplasty.  The defect was undermined extensively.  Additional orbicularis oris muscle was excised with a 15 blade scalpel.  The defect was converted into a full thickness wedge to facilite a better cosmetic result.  Small vessels were then tied off with 5-0 monocyrl. The orbicularis oris, superficial fascia, adipose and dermis were then reapproximated.  After the deeper layers were approximated the epidermis was reapproximated with particular care given to realign the vermilion border.

## 2023-11-13 ENCOUNTER — OFFICE VISIT (OUTPATIENT)
Dept: PULMONOLOGY | Age: 63
End: 2023-11-13
Payer: COMMERCIAL

## 2023-11-13 VITALS
RESPIRATION RATE: 16 BRPM | HEART RATE: 67 BPM | BODY MASS INDEX: 38.22 KG/M2 | HEIGHT: 70 IN | WEIGHT: 267 LBS | OXYGEN SATURATION: 97 %

## 2023-11-13 DIAGNOSIS — G47.33 OSA (OBSTRUCTIVE SLEEP APNEA): ICD-10-CM

## 2023-11-13 DIAGNOSIS — E66.9 OBESITY (BMI 30-39.9): ICD-10-CM

## 2023-11-13 DIAGNOSIS — G47.10 HYPERSOMNIA: ICD-10-CM

## 2023-11-13 PROCEDURE — 99214 OFFICE O/P EST MOD 30 MIN: CPT | Performed by: INTERNAL MEDICINE

## 2023-11-13 ASSESSMENT — ENCOUNTER SYMPTOMS
ABDOMINAL PAIN: 0
COUGH: 0
EYE ITCHING: 0
BACK PAIN: 0
SHORTNESS OF BREATH: 0
EYE DISCHARGE: 0
ABDOMINAL DISTENTION: 0

## 2023-11-13 NOTE — PROGRESS NOTES
Katty Tej  1960  Referring Provider: Zena Mendoza MD    Subjective:     Chief Complaint   Patient presents with    1 Year Follow Up    Sleep Apnea     Compliance check [employer] 10, Patient using [employer] now for his supplies and needs a new order. Patient reports using a nasal pillow mask Auto cpap 4-20 cmH2O       HPI  Taylor Escobar is a 61 y.o. male has come back as a follow up. He has severe STEVEN on a Auto CPAP which he has used it most of the nights about 18/30 days for more than 4 hours (60%). He has 10 lb weight gain. He has a nasal pillows mask. He is not sleepy or tired during the day time. His 2 week download data showed a residual AHI is 1.4 and leak is 32.4 L/min and his 95th percentile pressure is 8 cm h20. Current Outpatient Medications   Medication Sig Dispense Refill    CALCIUM PO Take by mouth      albuterol sulfate HFA (VENTOLIN HFA) 108 (90 Base) MCG/ACT inhaler Inhale 2 puffs into the lungs 4 times daily as needed for Wheezing 1 each 0    calcium carbonate (TUMS) 500 MG chewable tablet Take 1 tablet by mouth daily      vitamin B-12 (CYANOCOBALAMIN) 1000 MCG tablet Take 1 tablet by mouth daily      Potassium 99 MG TABS Take by mouth 2 tabs in am      meloxicam (MOBIC) 7.5 MG tablet Take 1 tablet by mouth daily as needed for Pain 30 tablet 1    Cinnamon 500 MG CAPS Take by mouth daily      Misc Natural Products (GLUCOSAMINE CHOND COMPLEX/MSM PO) Take by mouth      fish oil-omega-3 fatty acids 1000 MG capsule Take 2 capsules by mouth daily (Takes 3-4 per day)       No current facility-administered medications for this visit. No Known Allergies    Past Medical History:   Diagnosis Date    Bruised ribs 1999    running fall    Chronic shoulder pain     Contusion of ribs 2011    \"dislocated or bruised ribs center of rt back.     Depression 2011    Heel pain, bilateral 9/29/2014    Lipoma of arm 2011    left forearm- referred to Dr José Hough    Obesity (BMI 30-39.9)     STEVEN

## 2024-01-31 ENCOUNTER — OFFICE VISIT (OUTPATIENT)
Dept: INTERNAL MEDICINE CLINIC | Age: 64
End: 2024-01-31
Payer: COMMERCIAL

## 2024-01-31 VITALS — HEART RATE: 78 BPM | OXYGEN SATURATION: 93 % | TEMPERATURE: 98.7 F | BODY MASS INDEX: 38.74 KG/M2 | WEIGHT: 270 LBS

## 2024-01-31 DIAGNOSIS — R05.1 ACUTE COUGH: Primary | ICD-10-CM

## 2024-01-31 DIAGNOSIS — R09.81 NASAL CONGESTION: ICD-10-CM

## 2024-01-31 DIAGNOSIS — J06.9 VIRAL URI WITH COUGH: ICD-10-CM

## 2024-01-31 LAB
Lab: NORMAL
PERFORMING INSTRUMENT: NORMAL
QC PASS/FAIL: NORMAL
SARS-COV-2, POC: NORMAL

## 2024-01-31 PROCEDURE — 99213 OFFICE O/P EST LOW 20 MIN: CPT | Performed by: NURSE PRACTITIONER

## 2024-01-31 PROCEDURE — 87426 SARSCOV CORONAVIRUS AG IA: CPT | Performed by: NURSE PRACTITIONER

## 2024-01-31 RX ORDER — BENZONATATE 200 MG/1
200 CAPSULE ORAL 3 TIMES DAILY PRN
Qty: 21 CAPSULE | Refills: 0 | Status: SHIPPED | OUTPATIENT
Start: 2024-01-31 | End: 2024-02-07

## 2024-01-31 RX ORDER — FLUTICASONE PROPIONATE 50 MCG
2 SPRAY, SUSPENSION (ML) NASAL DAILY
Qty: 16 G | Refills: 0 | Status: SHIPPED | OUTPATIENT
Start: 2024-01-31

## 2024-01-31 ASSESSMENT — ENCOUNTER SYMPTOMS
COUGH: 1
SWOLLEN GLANDS: 0
NAUSEA: 0
DIARRHEA: 0
SINUS PAIN: 0
ABDOMINAL PAIN: 0
SINUS PRESSURE: 1
SORE THROAT: 1
WHEEZING: 0
RHINORRHEA: 1
VOMITING: 0

## 2024-01-31 NOTE — PROGRESS NOTES
Negrito Nicole (:  1960) is a 63 y.o. male,Established patient, here for evaluation of the following chief complaint(s):    Cough (Last friday), Nasal Congestion, Headache, Fatigue, Generalized Body Aches, Chest Congestion, and Fever (Feels like but doesn't)      SUBJECTIVE/OBJECTIVE:  Pt presents with c/o as stated below - substitute      LORENA   The current episode started in the past 7 days (5 days - onset last Friday). The problem has been unchanged. There has been no fever. Associated symptoms include congestion, coughing (Nonproductive), headaches, rhinorrhea, sneezing (occasional) and a sore throat (irritated from cough). Pertinent negatives include no abdominal pain, chest pain, diarrhea, dysuria, ear pain, joint pain, joint swelling, nausea, neck pain, plugged ear sensation, rash, sinus pain, swollen glands, vomiting or wheezing. Treatments tried: nyquil and dayquil. The treatment provided mild relief.       No Known Allergies     Current Outpatient Medications   Medication Sig Dispense Refill    fluticasone (FLONASE) 50 MCG/ACT nasal spray 2 sprays by Each Nostril route daily 16 g 0    benzonatate (TESSALON) 200 MG capsule Take 1 capsule by mouth 3 times daily as needed for Cough 21 capsule 0    CALCIUM PO Take by mouth      albuterol sulfate HFA (VENTOLIN HFA) 108 (90 Base) MCG/ACT inhaler Inhale 2 puffs into the lungs 4 times daily as needed for Wheezing 1 each 0    calcium carbonate (TUMS) 500 MG chewable tablet Take 1 tablet by mouth daily      vitamin B-12 (CYANOCOBALAMIN) 1000 MCG tablet Take 1 tablet by mouth daily      Potassium 99 MG TABS Take by mouth 2 tabs in am      meloxicam (MOBIC) 7.5 MG tablet Take 1 tablet by mouth daily as needed for Pain 30 tablet 1    Cinnamon 500 MG CAPS Take by mouth daily      Misc Natural Products (GLUCOSAMINE CHOND COMPLEX/MSM PO) Take by mouth      fish oil-omega-3 fatty acids 1000 MG capsule Take 2 capsules by mouth daily (Takes 3-4 per day)

## 2024-01-31 NOTE — PATIENT INSTRUCTIONS
To prevent dehydration, drink plenty of fluids. Choose water and other clear liquids until you feel better. If you have kidney, heart, or liver disease and have to limit fluids, talk with your doctor before you increase the amount of fluids you drink.  Ask your doctor if you can take an over-the-counter pain medicine, such as acetaminophen (Tylenol), ibuprofen (Advil, Motrin), or naproxen (Aleve). Be safe with medicines. Read and follow all instructions on the label. No one younger than 20 should take aspirin. It has been linked to Reye syndrome, a serious illness.  Be careful when taking over-the-counter cold or flu medicines and Tylenol at the same time. Many of these medicines have acetaminophen, which is Tylenol. Read the labels to make sure that you are not taking more than the recommended dose. Too much acetaminophen (Tylenol) can be harmful.  Get plenty of rest.  Use saline (saltwater) nasal washes to help keep your nasal passages open and wash out mucus and allergens. You can buy saline nose sprays at a grocery store or drugstore. Follow the instructions on the package. Or you can make your own at home. Add 1 teaspoon of non-iodized salt and 1 teaspoon of baking soda to 2 cups of distilled or boiled and cooled water. Fill a squeeze bottle or neti pot with the nasal wash. Then put the tip into your nostril, and lean over the sink. With your mouth open, gently squirt the liquid. Repeat on the other side.  Use a vaporizer or humidifier to add moisture to your bedroom. Follow the instructions for cleaning the machine.  Do not smoke or allow others to smoke around you. If you need help quitting, talk to your doctor about stop-smoking programs and medicines. These can increase your chances of quitting for good.

## 2024-02-01 ENCOUNTER — OFFICE VISIT (OUTPATIENT)
Dept: INTERNAL MEDICINE CLINIC | Age: 64
End: 2024-02-01
Payer: COMMERCIAL

## 2024-02-01 VITALS
BODY MASS INDEX: 38.65 KG/M2 | WEIGHT: 270 LBS | TEMPERATURE: 98.8 F | HEART RATE: 89 BPM | OXYGEN SATURATION: 93 % | HEIGHT: 70 IN

## 2024-02-01 DIAGNOSIS — J45.991 COUGH VARIANT ASTHMA: Primary | ICD-10-CM

## 2024-02-01 DIAGNOSIS — R05.8 PRODUCTIVE COUGH: ICD-10-CM

## 2024-02-01 PROCEDURE — 99213 OFFICE O/P EST LOW 20 MIN: CPT | Performed by: PHYSICIAN ASSISTANT

## 2024-02-01 RX ORDER — AZITHROMYCIN 250 MG/1
250 TABLET, FILM COATED ORAL SEE ADMIN INSTRUCTIONS
Qty: 6 TABLET | Refills: 0 | Status: SHIPPED | OUTPATIENT
Start: 2024-02-01 | End: 2024-02-06

## 2024-02-01 RX ORDER — ALBUTEROL SULFATE 90 UG/1
2 AEROSOL, METERED RESPIRATORY (INHALATION) 4 TIMES DAILY PRN
Qty: 54 G | Refills: 1 | Status: SHIPPED | OUTPATIENT
Start: 2024-02-01

## 2024-02-01 RX ORDER — PREDNISONE 10 MG/1
TABLET ORAL
Qty: 15 TABLET | Refills: 0 | Status: SHIPPED | OUTPATIENT
Start: 2024-02-01

## 2024-02-01 ASSESSMENT — ENCOUNTER SYMPTOMS
RHINORRHEA: 0
EYE REDNESS: 0
EYE PAIN: 0
CHEST TIGHTNESS: 1
WHEEZING: 0
EYE DISCHARGE: 0
PHOTOPHOBIA: 0
SORE THROAT: 0
BACK PAIN: 0
CONSTIPATION: 0
COLOR CHANGE: 0
DIARRHEA: 0
BLOOD IN STOOL: 0
SHORTNESS OF BREATH: 0
NAUSEA: 0
VOMITING: 0
ABDOMINAL PAIN: 0
COUGH: 1

## 2024-02-01 NOTE — PROGRESS NOTES
Negrito Nicole (:  1960) is a 63 y.o. male,Established patient, here for evaluation of the following chief complaint(s):    Cough (Started last Friday. Was here yesterday. Stated feels like it is getting worst. ), Nasal Congestion, Chest Congestion, Headache, Generalized Body Aches, and Pharyngitis (From coughing)      SUBJECTIVE/OBJECTIVE:  HPI  Negrito Nicole is a pleasant 63 y.o. male presenting to clinic today for cough/bronchitis. Patient was seen in clinic yesterday with similar symptoms; point-of-care COVID testing was negative, patient was treated with Tessalon Perles and Flonase.  Patient states that his cough and symptoms are generally worse today than they were yesterday.  Reports minimal if any relief with Tessalon Perles and Flonase.  Reports cough was initially dry however now is productive of purulent green mucus.  Reports some difficulty sleeping due to cough.  Reports he had similar episode last month and was seen at urgent care and prescribed prednisone and antibiotic and his symptoms improved well.  Reports he also use an inhaler at that time and this was beneficial however he ran out of it.  Patient reports history of asthma.        No Known Allergies    Current Outpatient Medications   Medication Sig Dispense Refill    predniSONE (DELTASONE) 10 MG tablet Take 30 mg x 5 days 15 tablet 0    albuterol sulfate HFA (VENTOLIN HFA) 108 (90 Base) MCG/ACT inhaler Inhale 2 puffs into the lungs 4 times daily as needed for Wheezing or Shortness of Breath (cough) 54 g 1    azithromycin (ZITHROMAX) 250 MG tablet Take 1 tablet by mouth See Admin Instructions for 5 days 500mg on day 1 followed by 250mg on days 2 - 5 6 tablet 0    fluticasone (FLONASE) 50 MCG/ACT nasal spray 2 sprays by Each Nostril route daily 16 g 0    benzonatate (TESSALON) 200 MG capsule Take 1 capsule by mouth 3 times daily as needed for Cough 21 capsule 0    CALCIUM PO Take by mouth      albuterol sulfate HFA (VENTOLIN HFA) 108 (90

## 2024-02-06 ENCOUNTER — OFFICE VISIT (OUTPATIENT)
Dept: ORTHOPEDIC SURGERY | Age: 64
End: 2024-02-06
Payer: MEDICARE

## 2024-02-06 VITALS — TEMPERATURE: 97.1 F | RESPIRATION RATE: 17 BRPM | OXYGEN SATURATION: 92 % | HEART RATE: 73 BPM

## 2024-02-06 DIAGNOSIS — M17.12 PRIMARY OSTEOARTHRITIS OF LEFT KNEE: Primary | ICD-10-CM

## 2024-02-06 PROCEDURE — 99214 OFFICE O/P EST MOD 30 MIN: CPT | Performed by: ORTHOPAEDIC SURGERY

## 2024-02-06 NOTE — PATIENT INSTRUCTIONS
We will schedule surgery at soonest convenience. If you have any questions regarding your surgery please call our office and ask to speak with Ai [phone]

## 2024-02-08 ASSESSMENT — ENCOUNTER SYMPTOMS
CHEST TIGHTNESS: 0
WHEEZING: 0
VOMITING: 0
EYE PAIN: 0
EYE REDNESS: 0
COLOR CHANGE: 0
SHORTNESS OF BREATH: 0

## 2024-02-08 NOTE — PROGRESS NOTES
Patient seen in office today for: Left knee pain, last inj 4/14/2022    Patient reports 6/10 pain.  RICE and medication are somewhat effective to alleviate pain and reduce swelling.     Pain worsened by: Patient reports painful ROM & weight bearing.     Patient interested in speaking to provider about surgical option.     Patient states his right knee is apart of a stem cell study.     
Take 1 tablet by mouth daily      vitamin B-12 (CYANOCOBALAMIN) 1000 MCG tablet Take 1 tablet by mouth daily      Potassium 99 MG TABS Take by mouth 2 tabs in am      meloxicam (MOBIC) 7.5 MG tablet Take 1 tablet by mouth daily as needed for Pain 30 tablet 1    Cinnamon 500 MG CAPS Take by mouth daily      Misc Natural Products (GLUCOSAMINE CHOND COMPLEX/MSM PO) Take by mouth      fish oil-omega-3 fatty acids 1000 MG capsule Take 2 capsules by mouth daily (Takes 3-4 per day)       No current facility-administered medications for this visit.     No Known Allergies      Review of Systems   Constitutional:  Negative for chills and fever.   HENT:  Negative for congestion and sneezing.    Eyes:  Negative for pain and redness.   Respiratory:  Negative for chest tightness, shortness of breath and wheezing.    Cardiovascular:  Negative for chest pain and palpitations.   Gastrointestinal:  Negative for vomiting.   Musculoskeletal:  Positive for arthralgias.   Skin:  Negative for color change and rash.   Neurological:  Negative for weakness and numbness.   Psychiatric/Behavioral:  Negative for agitation. The patient is not nervous/anxious.                                                Examination:  General Exam:  Vitals: Pulse 73   Temp 97.1 °F (36.2 °C)   Resp 17   SpO2 92%    Physical Exam  Vitals and nursing note reviewed.   Constitutional:       Appearance: Normal appearance.   HENT:      Head: Normocephalic and atraumatic.   Eyes:      Conjunctiva/sclera: Conjunctivae normal.      Pupils: Pupils are equal, round, and reactive to light.   Pulmonary:      Effort: Pulmonary effort is normal.   Musculoskeletal:      Cervical back: Normal range of motion.      Right hip: Normal.      Left hip: No deformity, tenderness, bony tenderness or crepitus. Normal range of motion. Normal strength.      Right knee: No swelling, deformity, effusion, erythema, ecchymosis or bony tenderness. Normal range of motion. No medial joint line

## 2024-02-09 ENCOUNTER — HOSPITAL ENCOUNTER (OUTPATIENT)
Age: 64
Discharge: HOME | End: 2024-02-09

## 2024-02-12 DIAGNOSIS — M17.12 PRIMARY OSTEOARTHRITIS OF LEFT KNEE: Primary | ICD-10-CM

## 2024-02-12 DIAGNOSIS — M25.562 CHRONIC PAIN OF LEFT KNEE: ICD-10-CM

## 2024-02-12 DIAGNOSIS — G89.29 CHRONIC PAIN OF LEFT KNEE: ICD-10-CM

## 2024-02-13 ENCOUNTER — OFFICE VISIT (OUTPATIENT)
Dept: INTERNAL MEDICINE CLINIC | Age: 64
End: 2024-02-13
Payer: MEDICARE

## 2024-02-13 ENCOUNTER — TELEPHONE (OUTPATIENT)
Dept: ORTHOPEDIC SURGERY | Age: 64
End: 2024-02-13

## 2024-02-13 VITALS — TEMPERATURE: 98.4 F | HEART RATE: 79 BPM | OXYGEN SATURATION: 96 %

## 2024-02-13 DIAGNOSIS — R05.9 COUGH, UNSPECIFIED TYPE: ICD-10-CM

## 2024-02-13 DIAGNOSIS — H66.90 ACUTE OTITIS MEDIA, UNSPECIFIED OTITIS MEDIA TYPE: Primary | ICD-10-CM

## 2024-02-13 PROCEDURE — 99213 OFFICE O/P EST LOW 20 MIN: CPT | Performed by: PHYSICIAN ASSISTANT

## 2024-02-13 RX ORDER — AMOXICILLIN AND CLAVULANATE POTASSIUM 875; 125 MG/1; MG/1
1 TABLET, FILM COATED ORAL 2 TIMES DAILY
Qty: 20 TABLET | Refills: 0 | Status: SHIPPED | OUTPATIENT
Start: 2024-02-13 | End: 2024-02-23

## 2024-02-13 RX ORDER — BENZONATATE 200 MG/1
200 CAPSULE ORAL 3 TIMES DAILY PRN
Qty: 30 CAPSULE | Refills: 0 | Status: SHIPPED | OUTPATIENT
Start: 2024-02-13 | End: 2024-02-20

## 2024-02-13 NOTE — TELEPHONE ENCOUNTER
Scheduled patient for:    Left Total Knee Arthroplasty  CPT: 23074  ICD 10: M17.12; M25.562  Surgery Date: 3/13/2024  Surgeon: Ginny  Facility: [facility]  Anesthesia: Spinal/Nerve Block  Product: [place]/[facility]    Physical Therapy: [facility] (at patient's request)-order created and faxed  CT of Lt Knee for [facility] Protocol-order created    Clearances sent to:  PCP: Beatrice  Pulmonary: Mary  Oncology: MINA    Insurance: [employer]  Prior auth started on 2/13/2024 via [employer] online portal  Clinicals uploaded-pending clinical review  #[id number]

## 2024-02-13 NOTE — PROGRESS NOTES
Negrito Nicole (:  1960) is a 63 y.o. male,Established patient, here for evaluation of the following chief complaint(s):    Otalgia       SUBJECTIVE/OBJECTIVE:  HPI  Negrito Nicole is a pleasant 63 y.o. male presenting to clinic today for ear pain/cough.  Patient was seen in clinic about 2 weeks ago, treated with prednisone and Z-El for cough/asthma; reports that his cough and breathing have improved however for the past week he has had worsening right ear pain, fullness and pressure; reports occasional sharp pains to right ear, reports less severe symptoms on left side.  Denies recent fevers.  Reports he has been using Flonase daily which does not seem to help.        No Known Allergies    Current Outpatient Medications   Medication Sig Dispense Refill    amoxicillin-clavulanate (AUGMENTIN) 875-125 MG per tablet Take 1 tablet by mouth 2 times daily for 10 days 20 tablet 0    benzonatate (TESSALON) 200 MG capsule Take 1 capsule by mouth 3 times daily as needed for Cough 30 capsule 0    predniSONE (DELTASONE) 10 MG tablet Take 30 mg x 5 days 15 tablet 0    albuterol sulfate HFA (VENTOLIN HFA) 108 (90 Base) MCG/ACT inhaler Inhale 2 puffs into the lungs 4 times daily as needed for Wheezing or Shortness of Breath (cough) 54 g 1    fluticasone (FLONASE) 50 MCG/ACT nasal spray 2 sprays by Each Nostril route daily 16 g 0    CALCIUM PO Take by mouth      albuterol sulfate HFA (VENTOLIN HFA) 108 (90 Base) MCG/ACT inhaler Inhale 2 puffs into the lungs 4 times daily as needed for Wheezing 1 each 0    calcium carbonate (TUMS) 500 MG chewable tablet Take 1 tablet by mouth daily      vitamin B-12 (CYANOCOBALAMIN) 1000 MCG tablet Take 1 tablet by mouth daily      Potassium 99 MG TABS Take by mouth 2 tabs in am      meloxicam (MOBIC) 7.5 MG tablet Take 1 tablet by mouth daily as needed for Pain 30 tablet 1    Cinnamon 500 MG CAPS Take by mouth daily      Misc Natural Products (GLUCOSAMINE CHOND COMPLEX/MSM PO) Take by mouth

## 2024-02-14 DIAGNOSIS — M17.12 PRIMARY OSTEOARTHRITIS OF LEFT KNEE: Primary | ICD-10-CM

## 2024-02-20 ENCOUNTER — HOSPITAL ENCOUNTER (OUTPATIENT)
Dept: CT IMAGING | Age: 64
Discharge: HOME | End: 2024-02-20
Attending: ORTHOPAEDIC SURGERY
Payer: COMMERCIAL

## 2024-02-20 DIAGNOSIS — M25.562 CHRONIC PAIN OF LEFT KNEE: ICD-10-CM

## 2024-02-20 DIAGNOSIS — M17.12 PRIMARY OSTEOARTHRITIS OF LEFT KNEE: ICD-10-CM

## 2024-02-20 DIAGNOSIS — G89.29 CHRONIC PAIN OF LEFT KNEE: ICD-10-CM

## 2024-02-20 PROCEDURE — 73700 CT LOWER EXTREMITY W/O DYE: CPT

## 2024-02-21 SDOH — HEALTH STABILITY: PHYSICAL HEALTH: ON AVERAGE, HOW MANY MINUTES DO YOU ENGAGE IN EXERCISE AT THIS LEVEL?: 50 MIN

## 2024-02-21 SDOH — HEALTH STABILITY: PHYSICAL HEALTH: ON AVERAGE, HOW MANY DAYS PER WEEK DO YOU ENGAGE IN MODERATE TO STRENUOUS EXERCISE (LIKE A BRISK WALK)?: 2 DAYS

## 2024-02-22 ENCOUNTER — CLINICAL DOCUMENTATION (OUTPATIENT)
Dept: ONCOLOGY | Age: 64
End: 2024-02-22

## 2024-02-23 ENCOUNTER — OFFICE VISIT (OUTPATIENT)
Dept: INTERNAL MEDICINE CLINIC | Age: 64
End: 2024-02-23

## 2024-02-23 VITALS
HEART RATE: 66 BPM | HEIGHT: 71 IN | OXYGEN SATURATION: 96 % | WEIGHT: 271 LBS | RESPIRATION RATE: 20 BRPM | BODY MASS INDEX: 37.94 KG/M2 | SYSTOLIC BLOOD PRESSURE: 126 MMHG | DIASTOLIC BLOOD PRESSURE: 84 MMHG

## 2024-02-23 DIAGNOSIS — Z76.89 ENCOUNTER TO ESTABLISH CARE WITH NEW DOCTOR: Primary | ICD-10-CM

## 2024-02-23 DIAGNOSIS — Z01.818 PRE-OPERATIVE EXAM: ICD-10-CM

## 2024-02-23 DIAGNOSIS — E78.5 DYSLIPIDEMIA: ICD-10-CM

## 2024-02-23 DIAGNOSIS — E55.9 VITAMIN D DEFICIENCY: ICD-10-CM

## 2024-02-23 DIAGNOSIS — E66.9 OBESITY (BMI 30-39.9): ICD-10-CM

## 2024-02-23 DIAGNOSIS — K76.0 HEPATIC STEATOSIS: ICD-10-CM

## 2024-02-23 DIAGNOSIS — E83.118 OTHER HEMOCHROMATOSIS: ICD-10-CM

## 2024-02-23 DIAGNOSIS — G47.33 OSA (OBSTRUCTIVE SLEEP APNEA): ICD-10-CM

## 2024-02-23 DIAGNOSIS — F33.0 MAJOR DEPRESSIVE DISORDER, RECURRENT EPISODE, MILD (HCC): ICD-10-CM

## 2024-02-23 DIAGNOSIS — R73.01 IMPAIRED FASTING GLUCOSE: ICD-10-CM

## 2024-02-23 DIAGNOSIS — J06.9 VIRAL UPPER RESPIRATORY TRACT INFECTION: ICD-10-CM

## 2024-02-23 DIAGNOSIS — N52.1 ERECTILE DYSFUNCTION DUE TO DISEASES CLASSIFIED ELSEWHERE: ICD-10-CM

## 2024-02-23 DIAGNOSIS — M17.11 PRIMARY OSTEOARTHRITIS OF RIGHT KNEE: ICD-10-CM

## 2024-02-23 DIAGNOSIS — D69.6 THROMBOCYTOPENIA (HCC): ICD-10-CM

## 2024-02-23 DIAGNOSIS — Z23 NEED FOR PNEUMOCOCCAL 20-VALENT CONJUGATE VACCINATION: ICD-10-CM

## 2024-02-23 DIAGNOSIS — J45.991 COUGH VARIANT ASTHMA: ICD-10-CM

## 2024-02-23 RX ORDER — PSEUDOEPHEDRINE HCL 30 MG
30 TABLET ORAL EVERY 6 HOURS PRN
Qty: 30 TABLET | Refills: 0 | Status: SHIPPED | OUTPATIENT
Start: 2024-02-23 | End: 2025-02-22

## 2024-02-23 ASSESSMENT — PATIENT HEALTH QUESTIONNAIRE - PHQ9
9. THOUGHTS THAT YOU WOULD BE BETTER OFF DEAD, OR OF HURTING YOURSELF: 0
7. TROUBLE CONCENTRATING ON THINGS, SUCH AS READING THE NEWSPAPER OR WATCHING TELEVISION: 0
4. FEELING TIRED OR HAVING LITTLE ENERGY: 1
10. IF YOU CHECKED OFF ANY PROBLEMS, HOW DIFFICULT HAVE THESE PROBLEMS MADE IT FOR YOU TO DO YOUR WORK, TAKE CARE OF THINGS AT HOME, OR GET ALONG WITH OTHER PEOPLE: 1
1. LITTLE INTEREST OR PLEASURE IN DOING THINGS: 0
3. TROUBLE FALLING OR STAYING ASLEEP: 1
8. MOVING OR SPEAKING SO SLOWLY THAT OTHER PEOPLE COULD HAVE NOTICED. OR THE OPPOSITE, BEING SO FIGETY OR RESTLESS THAT YOU HAVE BEEN MOVING AROUND A LOT MORE THAN USUAL: 0
SUM OF ALL RESPONSES TO PHQ QUESTIONS 1-9: 2
6. FEELING BAD ABOUT YOURSELF - OR THAT YOU ARE A FAILURE OR HAVE LET YOURSELF OR YOUR FAMILY DOWN: 0
SUM OF ALL RESPONSES TO PHQ QUESTIONS 1-9: 2
2. FEELING DOWN, DEPRESSED OR HOPELESS: 0
5. POOR APPETITE OR OVEREATING: 0
SUM OF ALL RESPONSES TO PHQ9 QUESTIONS 1 & 2: 0

## 2024-02-23 ASSESSMENT — ENCOUNTER SYMPTOMS
ABDOMINAL PAIN: 0
BACK PAIN: 0
COUGH: 0
SHORTNESS OF BREATH: 0
BLOOD IN STOOL: 0
CHEST TIGHTNESS: 0
NAUSEA: 0
STRIDOR: 0
VOMITING: 0

## 2024-02-23 NOTE — PROGRESS NOTES
Negrito Nicole (:  1960) is a 63 y.o. male,Established patient, here for evaluation of the following chief complaint(s):  Established New Doctor (Patient here to establish care. Previously Dr. PÉREZ patient ), Cough (Was sick a few weeks ago and still has a cough that is productive at times but mostly dry. ), Otalgia (He was seen a few weeks ago for ear infection. Still having right ear pain. Finished antibiotic yesterday ), and Other (Will have knee replacement surgery on 2024)          ASSESSMENT/PLAN:  1. Encounter to establish care with new doctor      2. STEVEN (obstructive sleep apnea)  Recommend continued CPAP complaince    3. Cough variant asthma  Cont RUSLAN PRN    4. Impaired fasting glucose  Check A1c  - Hemoglobin A1C; Future    5. Primary osteoarthritis of right knee  F/u with ortho for arthroscopy    6. Thrombocytopenia (HCC)  CBC ordered, f/u with Hem/ONc  - CBC; Future  - CBC    7. Major depressive disorder, recurrent episode, mild (HCC)  Not on medication, symptoms controlled    8. Obesity (BMI 30-39.9)  Recommend calorie restriction    9. Dyslipidemia  Check labs, cont fish oil and diet control  - TSH; Future  - Lipid Panel; Future  - T4, Free; Future    10. Erectile dysfunction due to diseases classified elsewhere  Not on medication    11. Need for pneumococcal 20-valent conjugate vaccination  printed  - pneumococcal 20-valent conjugat (PREVNAR) 0.5 ML ALON inj; Inject 0.5 mLs into the muscle once for 1 dose  Dispense: 0.5 mL; Refill: 0    12. Vitamin D deficiency  ordered  - Vitamin D 25 Hydroxy; Future    13. Hepatic steatosis  Ordered labs  - Comprehensive Metabolic Panel; Future  - Comprehensive Metabolic Panel    14. Pre-operative exam  EKG performed NSR, pending labs for pre-op clearance paperwork.  - EKG 12 Lead - Clinic Performed; Future  - EKG 12 Lead - Clinic Performed  - EKG 12 Lead - Clinic Performed    15. Viral upper respiratory tract infection  Start decongestant  -

## 2024-02-24 LAB
ALBUMIN SERPL-MCNC: 4.4 G/DL (ref 3.4–5)
ALBUMIN/GLOB SERPL: 2 {RATIO} (ref 1.1–2.2)
ALP SERPL-CCNC: 56 U/L (ref 40–129)
ALT SERPL-CCNC: 39 U/L (ref 10–40)
ANION GAP SERPL CALCULATED.3IONS-SCNC: 9 MMOL/L (ref 3–16)
AST SERPL-CCNC: 32 U/L (ref 15–37)
BILIRUB SERPL-MCNC: 1.3 MG/DL (ref 0–1)
BUN SERPL-MCNC: 12 MG/DL (ref 7–20)
CALCIUM SERPL-MCNC: 9.5 MG/DL (ref 8.3–10.6)
CHLORIDE SERPL-SCNC: 107 MMOL/L (ref 99–110)
CO2 SERPL-SCNC: 26 MMOL/L (ref 21–32)
CREAT SERPL-MCNC: 1 MG/DL (ref 0.8–1.3)
DEPRECATED RDW RBC AUTO: 14.3 % (ref 12.4–15.4)
GFR SERPLBLD CREATININE-BSD FMLA CKD-EPI: >60 ML/MIN/{1.73_M2}
GLUCOSE SERPL-MCNC: 102 MG/DL (ref 70–99)
HCT VFR BLD AUTO: 40.6 % (ref 40.5–52.5)
HGB BLD-MCNC: 14.4 G/DL (ref 13.5–17.5)
MCH RBC QN AUTO: 33.7 PG (ref 26–34)
MCHC RBC AUTO-ENTMCNC: 35.5 G/DL (ref 31–36)
MCV RBC AUTO: 94.8 FL (ref 80–100)
PLATELET # BLD AUTO: 134 K/UL (ref 135–450)
PMV BLD AUTO: 9.6 FL (ref 5–10.5)
POTASSIUM SERPL-SCNC: 4.6 MMOL/L (ref 3.5–5.1)
PROT SERPL-MCNC: 6.6 G/DL (ref 6.4–8.2)
RBC # BLD AUTO: 4.29 M/UL (ref 4.2–5.9)
SODIUM SERPL-SCNC: 142 MMOL/L (ref 136–145)
WBC # BLD AUTO: 4.6 K/UL (ref 4–11)

## 2024-02-26 ENCOUNTER — TELEPHONE (OUTPATIENT)
Dept: ORTHOPEDIC SURGERY | Age: 64
End: 2024-02-26

## 2024-02-26 ENCOUNTER — NURSE ONLY (OUTPATIENT)
Dept: ORTHOPEDIC SURGERY | Age: 64
End: 2024-02-26
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
## 2024-02-26 NOTE — PROGRESS NOTES
Joint Replacement Assessment, Left TKA, DOS 3/13/2024    Patient would like to discharge from [facility] with home health PT, then graduate to outpatient PT.     Message sent to Dr. Gross about concerns and anxiety.     All questions and concerns of the patient's have been answered at this time to the best of my ability. Patient is aware he may contact this nurse at her direct number given to him at anytime with questions or concerns moving forward.

## 2024-02-27 ASSESSMENT — PROMIS GLOBAL HEALTH SCALE
IN GENERAL, PLEASE RATE HOW WELL YOU CARRY OUT YOUR USUAL SOCIAL ACTIVITIES (INCLUDES ACTIVITIES AT HOME, AT WORK, AND IN YOUR COMMUNITY, AND RESPONSIBILITIES AS A PARENT, CHILD, SPOUSE, EMPLOYEE, FRIEND, ETC) [ON A SCALE OF 1 (POOR) TO 5 (EXCELLENT)]?: 3
HOW IS THE PROMIS V1.1 BEING ADMINISTERED?: 0
IN GENERAL, WOULD YOU SAY YOUR HEALTH IS...[ON A SCALE OF 1 (POOR) TO 5 (EXCELLENT)]: 3
IN THE PAST 7 DAYS, HOW OFTEN HAVE YOU BEEN BOTHERED BY EMOTIONAL PROBLEMS, SUCH AS FEELING ANXIOUS, DEPRESSED, OR IRRITABLE [ON A SCALE FROM 1 (NEVER) TO 5 (ALWAYS)]?: 4
TO WHAT EXTENT ARE YOU ABLE TO CARRY OUT YOUR EVERYDAY PHYSICAL ACTIVITIES SUCH AS WALKING, CLIMBING STAIRS, CARRYING GROCERIES, OR MOVING A CHAIR [ON A SCALE OF 1 (NOT AT ALL) TO 5 (COMPLETELY)]?: 2
IN THE PAST 7 DAYS, HOW WOULD YOU RATE YOUR FATIGUE ON AVERAGE [ON A SCALE FROM 1 (NONE) TO 5 (VERY SEVERE)]?: 4
IN GENERAL, HOW WOULD YOU RATE YOUR MENTAL HEALTH, INCLUDING YOUR MOOD AND YOUR ABILITY TO THINK [ON A SCALE OF 1 (POOR) TO 5 (EXCELLENT)]?: 4
IN THE PAST 7 DAYS, HOW WOULD YOU RATE YOUR PAIN ON AVERAGE [ON A SCALE FROM 0 (NO PAIN) TO 10 (WORST IMAGINABLE PAIN)]?: 5
IN GENERAL, HOW WOULD YOU RATE YOUR SATISFACTION WITH YOUR SOCIAL ACTIVITIES AND RELATIONSHIPS [ON A SCALE OF 1 (POOR) TO 5 (EXCELLENT)]?: 3
SUM OF RESPONSES TO QUESTIONS 2, 4, 5, & 10: 15
IN GENERAL, WOULD YOU SAY YOUR QUALITY OF LIFE IS...[ON A SCALE OF 1 (POOR) TO 5 (EXCELLENT)]: 4
WHO IS THE PERSON COMPLETING THE PROMIS V1.1 SURVEY?: 0
SUM OF RESPONSES TO QUESTIONS 3, 6, 7, & 8: 14
IN GENERAL, HOW WOULD YOU RATE YOUR PHYSICAL HEALTH [ON A SCALE OF 1 (POOR) TO 5 (EXCELLENT)]?: 3

## 2024-02-27 NOTE — PROGRESS NOTES
2/27/24 - Reminded of pre-testing on 2/28/24 @0915.   Bring insurance card, picture ID and a list of your home medications. Check in at the information desk in the lobby upon your arrival. You can eat and take morning medications. Patient verbalized understanding.

## 2024-02-28 ENCOUNTER — HOSPITAL ENCOUNTER (OUTPATIENT)
Age: 64
End: 2024-02-28
Payer: COMMERCIAL

## 2024-02-28 ENCOUNTER — HOSPITAL ENCOUNTER (OUTPATIENT)
Age: 64
Discharge: HOME | End: 2024-02-28
Payer: COMMERCIAL

## 2024-02-28 ENCOUNTER — HOSPITAL ENCOUNTER (OUTPATIENT)
Dept: GENERAL RADIOLOGY | Age: 64
Discharge: HOME | End: 2024-02-28
Payer: COMMERCIAL

## 2024-02-28 ENCOUNTER — HOSPITAL ENCOUNTER (OUTPATIENT)
Dept: PREADMISSION TESTING | Age: 64
Discharge: HOME | End: 2024-02-28
Payer: COMMERCIAL

## 2024-02-28 VITALS
BODY MASS INDEX: 37.55 KG/M2 | TEMPERATURE: 97 F | HEART RATE: 73 BPM | DIASTOLIC BLOOD PRESSURE: 90 MMHG | HEIGHT: 71 IN | WEIGHT: 268.2 LBS | SYSTOLIC BLOOD PRESSURE: 140 MMHG | RESPIRATION RATE: 16 BRPM | OXYGEN SATURATION: 93 %

## 2024-02-28 DIAGNOSIS — Z01.818 PRE-OP TESTING: ICD-10-CM

## 2024-02-28 DIAGNOSIS — E66.9 OBESITY (BMI 30-39.9): Primary | ICD-10-CM

## 2024-02-28 DIAGNOSIS — Z01.818 PRE-OP TESTING: Primary | ICD-10-CM

## 2024-02-28 LAB
ALBUMIN SERPL-MCNC: 4.5 GM/DL (ref 3.4–5)
ALP BLD-CCNC: 57 IU/L (ref 40–128)
ALT SERPL-CCNC: 42 U/L (ref 10–40)
ANION GAP SERPL CALCULATED.3IONS-SCNC: 12 MMOL/L (ref 7–16)
AST SERPL-CCNC: 35 IU/L (ref 15–37)
BASOPHILS ABSOLUTE: 0 K/CU MM
BASOPHILS RELATIVE PERCENT: 0.7 % (ref 0–1)
BILIRUB SERPL-MCNC: 2.2 MG/DL (ref 0–1)
BILIRUBIN URINE: NEGATIVE MG/DL
BLOOD, URINE: NEGATIVE
BUN SERPL-MCNC: 21 MG/DL (ref 6–23)
CALCIUM SERPL-MCNC: 9.6 MG/DL (ref 8.3–10.6)
CHLORIDE BLD-SCNC: 108 MMOL/L (ref 99–110)
CLARITY: CLEAR
CO2: 22 MMOL/L (ref 21–32)
COLOR: YELLOW
COMMENT UA: NORMAL
CREAT SERPL-MCNC: 1 MG/DL (ref 0.9–1.3)
DIFFERENTIAL TYPE: ABNORMAL
EOSINOPHILS ABSOLUTE: 0.2 K/CU MM
EOSINOPHILS RELATIVE PERCENT: 3.8 % (ref 0–3)
ERYTHROCYTE SEDIMENTATION RATE: 7 MM/HR (ref 0–20)
GFR SERPL CREATININE-BSD FRML MDRD: >60 ML/MIN/1.73M2
GLUCOSE SERPL-MCNC: 107 MG/DL (ref 70–99)
GLUCOSE, URINE: NEGATIVE MG/DL
HCT VFR BLD CALC: 43 % (ref 42–52)
HEMOGLOBIN: 14.3 GM/DL (ref 13.5–18)
IMMATURE NEUTROPHIL %: 0.2 % (ref 0–0.43)
KETONES, URINE: NEGATIVE MG/DL
LEUKOCYTE ESTERASE, URINE: NEGATIVE
LYMPHOCYTES ABSOLUTE: 1.5 K/CU MM
LYMPHOCYTES RELATIVE PERCENT: 32.9 % (ref 24–44)
MCH RBC QN AUTO: 32.4 PG (ref 27–31)
MCHC RBC AUTO-ENTMCNC: 33.3 % (ref 32–36)
MCV RBC AUTO: 97.5 FL (ref 78–100)
MONOCYTES ABSOLUTE: 0.3 K/CU MM
MONOCYTES RELATIVE PERCENT: 6.9 % (ref 0–4)
NITRITE URINE, QUANTITATIVE: NEGATIVE
NUCLEATED RBC %: 0.4 %
PDW BLD-RTO: 13.9 % (ref 11.7–14.9)
PH, URINE: 5.5 (ref 5–8)
PLATELET # BLD: 127 K/CU MM (ref 140–440)
PMV BLD AUTO: 10.6 FL (ref 7.5–11.1)
POTASSIUM SERPL-SCNC: 4.5 MMOL/L (ref 3.5–5.1)
PROTEIN UA: NEGATIVE MG/DL
RBC # BLD: 4.41 M/CU MM (ref 4.6–6.2)
SEGMENTED NEUTROPHILS ABSOLUTE COUNT: 2.5 K/CU MM
SEGMENTED NEUTROPHILS RELATIVE PERCENT: 55.5 % (ref 36–66)
SODIUM BLD-SCNC: 142 MMOL/L (ref 135–145)
SPECIFIC GRAVITY UA: >1.03 (ref 1–1.03)
TOTAL IMMATURE NEUTOROPHIL: 0.01 K/CU MM
TOTAL NUCLEATED RBC: 0 K/CU MM
TOTAL PROTEIN: 6.6 GM/DL (ref 6.4–8.2)
UROBILINOGEN, URINE: 0.2 MG/DL (ref 0.2–1)
WBC # BLD: 4.5 K/CU MM (ref 4–10.5)

## 2024-02-28 PROCEDURE — 71046 X-RAY EXAM CHEST 2 VIEWS: CPT

## 2024-02-28 PROCEDURE — 85652 RBC SED RATE AUTOMATED: CPT

## 2024-02-28 PROCEDURE — 80053 COMPREHEN METABOLIC PANEL: CPT

## 2024-02-28 PROCEDURE — 36415 COLL VENOUS BLD VENIPUNCTURE: CPT

## 2024-02-28 PROCEDURE — 81003 URINALYSIS AUTO W/O SCOPE: CPT

## 2024-02-28 PROCEDURE — 87086 URINE CULTURE/COLONY COUNT: CPT

## 2024-02-28 PROCEDURE — 85025 COMPLETE CBC W/AUTO DIFF WBC: CPT

## 2024-02-28 RX ORDER — TOPIRAMATE 25 MG/1
25 TABLET ORAL 2 TIMES DAILY
Qty: 90 TABLET | Refills: 1 | Status: SHIPPED | OUTPATIENT
Start: 2024-02-28 | End: 2024-02-28 | Stop reason: SDUPTHER

## 2024-02-28 RX ORDER — TOPIRAMATE 25 MG/1
25 TABLET ORAL 2 TIMES DAILY
Qty: 90 TABLET | Refills: 1 | Status: SHIPPED | OUTPATIENT
Start: 2024-02-28

## 2024-02-28 NOTE — PROGRESS NOTES
.Surgery @ [facility] on 3/13/24 you will be called 3/12/24 with times    NOTHING TO EAT OR DRINK AFTER MIDNIGHT DAY OF SURGERY    1. Enter thru the hospital main entrance on day of surgery, check in at the Information Desk. If you arrive prior to 6:00am, enter thru the ER entrance.    2. Follow the directions as prescribed by the doctor for your procedure and medications.         Morning of surgery take:  No medications, use your inhaler and bring with your CPAP         Stop vitamins, supplements and NSAIDS:  3/6/24    3. Check with your Doctor regarding stopping blood thinners and follow their instructions.    4. Do not smoke, vape or use chewing tobacco morning of surgery. Do not drink any alcoholic beverages 24 hours prior to surgery.       This includes NA Beer. No street drugs 7 days prior to surgery.    5. If you have dentures, contacts of glasses they will be removed before going to the OR; please bring a case.    6. Please bring picture ID, insurance card, paperwork from the doctor’s office (H & P, Consent, & card for implantable devices).    7. Take a shower with an antibacterial soap the night before surgery and the morning of surgery. Do not put anything on your skin      After your morning shower.    8. You will need a responsible adult to drive you home and check on you after surgery.

## 2024-02-29 LAB
CULTURE: NORMAL
Lab: NORMAL
SPECIMEN: NORMAL

## 2024-03-05 ENCOUNTER — HOSPITAL ENCOUNTER (OUTPATIENT)
Age: 64
Discharge: HOME | End: 2024-03-05
Payer: COMMERCIAL

## 2024-03-05 DIAGNOSIS — R79.89 ELEVATED FERRITIN: ICD-10-CM

## 2024-03-05 DIAGNOSIS — D69.6 THROMBOCYTOPENIA (HCC): ICD-10-CM

## 2024-03-05 LAB
ALBUMIN SERPL-MCNC: 4 GM/DL (ref 3.4–5)
ALP BLD-CCNC: 56 IU/L (ref 40–129)
ALT SERPL-CCNC: 30 U/L (ref 10–40)
ANION GAP SERPL CALCULATED.3IONS-SCNC: 13 MMOL/L (ref 7–16)
AST SERPL-CCNC: 24 IU/L (ref 15–37)
BASOPHILS ABSOLUTE: 0 K/CU MM
BASOPHILS RELATIVE PERCENT: 0.6 % (ref 0–1)
BILIRUB SERPL-MCNC: 2.5 MG/DL (ref 0–1)
BUN SERPL-MCNC: 17 MG/DL (ref 6–23)
CALCIUM SERPL-MCNC: 8.7 MG/DL (ref 8.3–10.6)
CHLORIDE BLD-SCNC: 107 MMOL/L (ref 99–110)
CO2: 23 MMOL/L (ref 21–32)
CREAT SERPL-MCNC: 0.9 MG/DL (ref 0.9–1.3)
DIFFERENTIAL TYPE: ABNORMAL
EOSINOPHILS ABSOLUTE: 0.2 K/CU MM
EOSINOPHILS RELATIVE PERCENT: 3.1 % (ref 0–3)
GFR SERPL CREATININE-BSD FRML MDRD: >60 ML/MIN/1.73M2
GLUCOSE SERPL-MCNC: 109 MG/DL (ref 70–99)
HCT VFR BLD CALC: 42.2 % (ref 42–52)
HEMOGLOBIN: 14.8 GM/DL (ref 13.5–18)
IMMATURE NEUTROPHIL %: 0.4 % (ref 0–0.43)
LYMPHOCYTES ABSOLUTE: 1.7 K/CU MM
LYMPHOCYTES RELATIVE PERCENT: 34.7 % (ref 24–44)
MCH RBC QN AUTO: 33.6 PG (ref 27–31)
MCHC RBC AUTO-ENTMCNC: 35.1 % (ref 32–36)
MCV RBC AUTO: 95.7 FL (ref 78–100)
MONOCYTES ABSOLUTE: 0.4 K/CU MM
MONOCYTES RELATIVE PERCENT: 7.7 % (ref 0–4)
PDW BLD-RTO: 13.9 % (ref 11.7–14.9)
PLATELET # BLD: 137 K/CU MM (ref 140–440)
PMV BLD AUTO: 10.7 FL (ref 7.5–11.1)
POTASSIUM SERPL-SCNC: 4.2 MMOL/L (ref 3.5–5.1)
RBC # BLD: 4.41 M/CU MM (ref 4.6–6.2)
SEGMENTED NEUTROPHILS ABSOLUTE COUNT: 2.6 K/CU MM
SEGMENTED NEUTROPHILS RELATIVE PERCENT: 53.5 % (ref 36–66)
SODIUM BLD-SCNC: 143 MMOL/L (ref 135–145)
TOTAL IMMATURE NEUTOROPHIL: 0.02 K/CU MM
TOTAL PROTEIN: 6.6 GM/DL (ref 6.4–8.2)
WBC # BLD: 4.8 K/CU MM (ref 4–10.5)

## 2024-03-05 PROCEDURE — 82728 ASSAY OF FERRITIN: CPT

## 2024-03-05 PROCEDURE — 85025 COMPLETE CBC W/AUTO DIFF WBC: CPT

## 2024-03-05 PROCEDURE — 80053 COMPREHEN METABOLIC PANEL: CPT

## 2024-03-05 PROCEDURE — 83540 ASSAY OF IRON: CPT

## 2024-03-05 PROCEDURE — 36415 COLL VENOUS BLD VENIPUNCTURE: CPT

## 2024-03-05 PROCEDURE — 83550 IRON BINDING TEST: CPT

## 2024-03-06 LAB
FERRITIN: 256 NG/ML (ref 30–400)
IRON: 123 UG/DL (ref 59–158)
PCT TRANSFERRIN: 39 % (ref 10–44)
TOTAL IRON BINDING CAPACITY: 312 UG/DL (ref 250–450)
UNSATURATED IRON BINDING CAPACITY: 189 UG/DL (ref 110–370)

## 2024-03-08 ENCOUNTER — OFFICE VISIT (OUTPATIENT)
Dept: ONCOLOGY | Age: 64
End: 2024-03-08
Payer: COMMERCIAL

## 2024-03-08 ENCOUNTER — HOSPITAL ENCOUNTER (OUTPATIENT)
Dept: INFUSION THERAPY | Age: 64
Discharge: HOME | End: 2024-03-08
Payer: COMMERCIAL

## 2024-03-08 VITALS
DIASTOLIC BLOOD PRESSURE: 80 MMHG | HEIGHT: 71 IN | HEART RATE: 66 BPM | TEMPERATURE: 97.9 F | OXYGEN SATURATION: 95 % | SYSTOLIC BLOOD PRESSURE: 158 MMHG | BODY MASS INDEX: 37.66 KG/M2 | WEIGHT: 269 LBS

## 2024-03-08 DIAGNOSIS — R79.89 ELEVATED FERRITIN: Primary | ICD-10-CM

## 2024-03-08 DIAGNOSIS — D69.6 THROMBOCYTOPENIA (HCC): ICD-10-CM

## 2024-03-08 PROCEDURE — G8417 CALC BMI ABV UP PARAM F/U: HCPCS | Performed by: INTERNAL MEDICINE

## 2024-03-08 PROCEDURE — G8427 DOCREV CUR MEDS BY ELIG CLIN: HCPCS | Performed by: INTERNAL MEDICINE

## 2024-03-08 PROCEDURE — 99213 OFFICE O/P EST LOW 20 MIN: CPT | Performed by: INTERNAL MEDICINE

## 2024-03-08 PROCEDURE — 99211 OFF/OP EST MAY X REQ PHY/QHP: CPT

## 2024-03-08 PROCEDURE — G8484 FLU IMMUNIZE NO ADMIN: HCPCS | Performed by: INTERNAL MEDICINE

## 2024-03-08 PROCEDURE — 1036F TOBACCO NON-USER: CPT | Performed by: INTERNAL MEDICINE

## 2024-03-08 PROCEDURE — 3017F COLORECTAL CA SCREEN DOC REV: CPT | Performed by: INTERNAL MEDICINE

## 2024-03-08 NOTE — PROGRESS NOTES
MA Rooming Questions  Patient: Negrito Nicole  MRN: [id number]    Date: 3/8/2024        1. Do you have any new issues?   yes - patient states he is concerned about his platelet was told the injury to the knee could be caused by blood insuffiencey          2. Do you need any refills on medications?    no    3. Have you had any imaging done since your last visit?   yes - ct knee chest xray 2/28 labs 3/5    4. Have you been hospitalized or seen in the emergency room since your last visit here?   no    5. Did the patient have a depression screening completed today? No    No data recorded     PHQ-9 Given to (if applicable):               PHQ-9 Score (if applicable):                     [] Positive     []  Negative              Does question #9 need addressed (if applicable)                     [] Yes    []  No               Bettye Dawson, CMA

## 2024-03-08 NOTE — PROGRESS NOTES
Patient Name:  Negrito Nicole  Patient :  1960  Patient MRN:  [id number]     Primary Oncologist: Rito Tejeda MD  Referring Provider: Roberto Gonzalez MD     Date of Service: 3/8/2024     Chief Complaint:    Chief Complaint   Patient presents with    Follow-up    Results     Patient Active Problem List:     Obesity, Class II, BMI 35-39.9, with comorbidity (HCC)     Chronic right shoulder pain     STEVEN on CPAP     Total bilirubin, elevated     Fatigue     Major depressive disorder, recurrent episode, mild (HCC)     Erectile dysfunction     Colon polyps     RAD (reactive airway disease)     Impaired fasting glucose     Hepatic steatosis- seen on CT abdomen     Diverticulosis of large intestine- seen on CT abdomen     Hypersomnia     Primary osteoarthritis of right knee     Cough variant asthma    HPI:   Negrito Nicole is a 63-year-old very pleasant gentleman with medical history history significant for hyperlipidemia, depression, obstructive sleep apnea, obesity, fatty liver and osteoarthritis, initially referred to me on 2022 for evaluation of elevated serum ferritin level with high transferrin saturation and mild thrombocytopenia.    He has been having elevated liver enzymes and has been followed by gastroenterology. He recently established care as a new patient with Dr. Barron.     Viral hepatitis panels done on 10/18/21 and 3/3/22 were negative. JENNIFER, F-actin IgG, tissue transglut Ab, transglutaminase IgA were negative. A1AT and ceruloplasmin were within normal range.     CT abdomen/pelvis in 2018 showed fatty liver. US abdomen on 22 showed hepatitis steatosis.  Fibroscan done on 22 showed advanced fibrosis and possibly cirrhosis.     Iron panel on 10/18/21, 3/3/22 and 22 showed persistently elevated serum ferritin level and transferrin saturation.     He also has mild thrombocytopenia since . He doesn't have anemia or neutropenia.     He stopped drinking alcohol since 3/2022. Before

## 2024-03-12 ENCOUNTER — ANESTHESIA EVENT (OUTPATIENT)
Dept: OPERATING ROOM | Age: 64
End: 2024-03-12
Payer: COMMERCIAL

## 2024-03-12 NOTE — ANESTHESIA PRE PROCEDURE
CRNA.          Post-op pain plan if not by surgeon: single peripheral nerve block          HENRY Rea - CRNA   3/12/2024

## 2024-03-13 ENCOUNTER — ANESTHESIA (OUTPATIENT)
Dept: OPERATING ROOM | Age: 64
End: 2024-03-13
Payer: COMMERCIAL

## 2024-03-13 ENCOUNTER — HOSPITAL ENCOUNTER (OUTPATIENT)
Age: 64
Setting detail: OBSERVATION
Discharge: HOME | End: 2024-03-14
Attending: ORTHOPAEDIC SURGERY | Admitting: ORTHOPAEDIC SURGERY
Payer: COMMERCIAL

## 2024-03-13 ENCOUNTER — APPOINTMENT (OUTPATIENT)
Dept: GENERAL RADIOLOGY | Age: 64
End: 2024-03-13
Attending: ORTHOPAEDIC SURGERY
Payer: COMMERCIAL

## 2024-03-13 DIAGNOSIS — Z96.652 STATUS POST LEFT KNEE REPLACEMENT: Primary | ICD-10-CM

## 2024-03-13 PROBLEM — M17.12 PRIMARY OSTEOARTHRITIS OF LEFT KNEE: Status: ACTIVE | Noted: 2024-03-13

## 2024-03-13 PROCEDURE — 3700000000 HC ANESTHESIA ATTENDED CARE: Performed by: ORTHOPAEDIC SURGERY

## 2024-03-13 PROCEDURE — G0378 HOSPITAL OBSERVATION PER HR: HCPCS

## 2024-03-13 PROCEDURE — 6370000000 HC RX 637 (ALT 250 FOR IP): Performed by: ORTHOPAEDIC SURGERY

## 2024-03-13 PROCEDURE — 64447 NJX AA&/STRD FEMORAL NRV IMG: CPT | Performed by: ANESTHESIOLOGY

## 2024-03-13 PROCEDURE — 2500000003 HC RX 250 WO HCPCS: Performed by: ORTHOPAEDIC SURGERY

## 2024-03-13 PROCEDURE — 97116 GAIT TRAINING THERAPY: CPT

## 2024-03-13 PROCEDURE — 7100000001 HC PACU RECOVERY - ADDTL 15 MIN: Performed by: ORTHOPAEDIC SURGERY

## 2024-03-13 PROCEDURE — 7100000000 HC PACU RECOVERY - FIRST 15 MIN: Performed by: ORTHOPAEDIC SURGERY

## 2024-03-13 PROCEDURE — 6360000002 HC RX W HCPCS: Performed by: ORTHOPAEDIC SURGERY

## 2024-03-13 PROCEDURE — A4217 STERILE WATER/SALINE, 500 ML: HCPCS | Performed by: ORTHOPAEDIC SURGERY

## 2024-03-13 PROCEDURE — 3600000009 HC SURGERY ROBOT BASE: Performed by: ORTHOPAEDIC SURGERY

## 2024-03-13 PROCEDURE — 73560 X-RAY EXAM OF KNEE 1 OR 2: CPT

## 2024-03-13 PROCEDURE — 2580000003 HC RX 258: Performed by: ORTHOPAEDIC SURGERY

## 2024-03-13 PROCEDURE — 3600000019 HC SURGERY ROBOT ADDTL 15MIN: Performed by: ORTHOPAEDIC SURGERY

## 2024-03-13 PROCEDURE — 6360000002 HC RX W HCPCS: Performed by: ANESTHESIOLOGY

## 2024-03-13 PROCEDURE — 2720000010 HC SURG SUPPLY STERILE: Performed by: ORTHOPAEDIC SURGERY

## 2024-03-13 PROCEDURE — C1776 JOINT DEVICE (IMPLANTABLE): HCPCS | Performed by: ORTHOPAEDIC SURGERY

## 2024-03-13 PROCEDURE — 2709999900 HC NON-CHARGEABLE SUPPLY: Performed by: ORTHOPAEDIC SURGERY

## 2024-03-13 PROCEDURE — S2900 ROBOTIC SURGICAL SYSTEM: HCPCS | Performed by: ORTHOPAEDIC SURGERY

## 2024-03-13 PROCEDURE — 97530 THERAPEUTIC ACTIVITIES: CPT

## 2024-03-13 PROCEDURE — 97162 PT EVAL MOD COMPLEX 30 MIN: CPT

## 2024-03-13 PROCEDURE — 6360000002 HC RX W HCPCS: Performed by: NURSE ANESTHETIST, CERTIFIED REGISTERED

## 2024-03-13 PROCEDURE — C1713 ANCHOR/SCREW BN/BN,TIS/BN: HCPCS | Performed by: ORTHOPAEDIC SURGERY

## 2024-03-13 PROCEDURE — 3700000001 HC ADD 15 MINUTES (ANESTHESIA): Performed by: ORTHOPAEDIC SURGERY

## 2024-03-13 PROCEDURE — 2580000003 HC RX 258: Performed by: NURSE ANESTHETIST, CERTIFIED REGISTERED

## 2024-03-13 DEVICE — CEMENT BNE 40GM HI VISC RADPQ FOR REV SURG: Type: IMPLANTABLE DEVICE | Site: KNEE | Status: FUNCTIONAL

## 2024-03-13 RX ORDER — MIDAZOLAM HYDROCHLORIDE 1 MG/ML
INJECTION INTRAMUSCULAR; INTRAVENOUS PRN
Status: DISCONTINUED | OUTPATIENT
Start: 2024-03-13 | End: 2024-03-13 | Stop reason: SDUPTHER

## 2024-03-13 RX ORDER — PROPOFOL 10 MG/ML
INJECTION, EMULSION INTRAVENOUS CONTINUOUS PRN
Status: DISCONTINUED | OUTPATIENT
Start: 2024-03-13 | End: 2024-03-13 | Stop reason: SDUPTHER

## 2024-03-13 RX ORDER — ACETAMINOPHEN 325 MG/1
650 TABLET ORAL EVERY 4 HOURS PRN
Status: DISCONTINUED | OUTPATIENT
Start: 2024-03-13 | End: 2024-03-14 | Stop reason: HOSPADM

## 2024-03-13 RX ORDER — ROPIVACAINE HYDROCHLORIDE 5 MG/ML
INJECTION, SOLUTION EPIDURAL; INFILTRATION; PERINEURAL
Status: COMPLETED
Start: 2024-03-13 | End: 2024-03-13

## 2024-03-13 RX ORDER — NALOXONE HYDROCHLORIDE 0.4 MG/ML
INJECTION, SOLUTION INTRAMUSCULAR; INTRAVENOUS; SUBCUTANEOUS PRN
Status: DISCONTINUED | OUTPATIENT
Start: 2024-03-13 | End: 2024-03-13 | Stop reason: HOSPADM

## 2024-03-13 RX ORDER — ONDANSETRON 2 MG/ML
INJECTION INTRAMUSCULAR; INTRAVENOUS PRN
Status: DISCONTINUED | OUTPATIENT
Start: 2024-03-13 | End: 2024-03-13 | Stop reason: SDUPTHER

## 2024-03-13 RX ORDER — SODIUM CHLORIDE, SODIUM LACTATE, POTASSIUM CHLORIDE, CALCIUM CHLORIDE 600; 310; 30; 20 MG/100ML; MG/100ML; MG/100ML; MG/100ML
INJECTION, SOLUTION INTRAVENOUS CONTINUOUS
Status: DISPENSED | OUTPATIENT
Start: 2024-03-13 | End: 2024-03-14

## 2024-03-13 RX ORDER — ONDANSETRON 2 MG/ML
4 INJECTION INTRAMUSCULAR; INTRAVENOUS EVERY 6 HOURS PRN
Status: DISCONTINUED | OUTPATIENT
Start: 2024-03-13 | End: 2024-03-14 | Stop reason: HOSPADM

## 2024-03-13 RX ORDER — FENTANYL CITRATE 50 UG/ML
INJECTION, SOLUTION INTRAMUSCULAR; INTRAVENOUS
Status: COMPLETED | OUTPATIENT
Start: 2024-03-13 | End: 2024-03-13

## 2024-03-13 RX ORDER — SODIUM CHLORIDE 9 MG/ML
INJECTION, SOLUTION INTRAVENOUS PRN
Status: DISCONTINUED | OUTPATIENT
Start: 2024-03-13 | End: 2024-03-13 | Stop reason: HOSPADM

## 2024-03-13 RX ORDER — GLYCOPYRROLATE 0.2 MG/ML
INJECTION INTRAMUSCULAR; INTRAVENOUS PRN
Status: DISCONTINUED | OUTPATIENT
Start: 2024-03-13 | End: 2024-03-13 | Stop reason: SDUPTHER

## 2024-03-13 RX ORDER — SODIUM CHLORIDE 0.9 % (FLUSH) 0.9 %
5-40 SYRINGE (ML) INJECTION PRN
Status: DISCONTINUED | OUTPATIENT
Start: 2024-03-13 | End: 2024-03-14 | Stop reason: HOSPADM

## 2024-03-13 RX ORDER — MAGNESIUM HYDROXIDE 1200 MG/15ML
LIQUID ORAL CONTINUOUS PRN
Status: COMPLETED | OUTPATIENT
Start: 2024-03-13 | End: 2024-03-13

## 2024-03-13 RX ORDER — ASPIRIN 81 MG/1
325 TABLET, CHEWABLE ORAL 2 TIMES DAILY
Status: DISCONTINUED | OUTPATIENT
Start: 2024-03-13 | End: 2024-03-14 | Stop reason: HOSPADM

## 2024-03-13 RX ORDER — DEXAMETHASONE SODIUM PHOSPHATE 4 MG/ML
INJECTION, SOLUTION INTRA-ARTICULAR; INTRALESIONAL; INTRAMUSCULAR; INTRAVENOUS; SOFT TISSUE PRN
Status: DISCONTINUED | OUTPATIENT
Start: 2024-03-13 | End: 2024-03-13 | Stop reason: SDUPTHER

## 2024-03-13 RX ORDER — DEXAMETHASONE SODIUM PHOSPHATE 4 MG/ML
INJECTION, SOLUTION INTRA-ARTICULAR; INTRALESIONAL; INTRAMUSCULAR; INTRAVENOUS; SOFT TISSUE
Status: COMPLETED | OUTPATIENT
Start: 2024-03-13 | End: 2024-03-13

## 2024-03-13 RX ORDER — TRANEXAMIC ACID 10 MG/ML
1000 INJECTION, SOLUTION INTRAVENOUS
Status: COMPLETED | OUTPATIENT
Start: 2024-03-13 | End: 2024-03-13

## 2024-03-13 RX ORDER — TOPIRAMATE 25 MG/1
25 TABLET ORAL 2 TIMES DAILY
Status: DISCONTINUED | OUTPATIENT
Start: 2024-03-13 | End: 2024-03-13

## 2024-03-13 RX ORDER — SODIUM CHLORIDE 0.9 % (FLUSH) 0.9 %
5-40 SYRINGE (ML) INJECTION EVERY 12 HOURS SCHEDULED
Status: DISCONTINUED | OUTPATIENT
Start: 2024-03-13 | End: 2024-03-13 | Stop reason: HOSPADM

## 2024-03-13 RX ORDER — MIDAZOLAM HYDROCHLORIDE 1 MG/ML
INJECTION INTRAMUSCULAR; INTRAVENOUS
Status: COMPLETED
Start: 2024-03-13 | End: 2024-03-13

## 2024-03-13 RX ORDER — SODIUM CHLORIDE 0.9 % (FLUSH) 0.9 %
5-40 SYRINGE (ML) INJECTION PRN
Status: DISCONTINUED | OUTPATIENT
Start: 2024-03-13 | End: 2024-03-13 | Stop reason: HOSPADM

## 2024-03-13 RX ORDER — SODIUM CHLORIDE 9 MG/ML
INJECTION, SOLUTION INTRAVENOUS PRN
Status: DISCONTINUED | OUTPATIENT
Start: 2024-03-13 | End: 2024-03-14 | Stop reason: HOSPADM

## 2024-03-13 RX ORDER — FENTANYL CITRATE 50 UG/ML
25 INJECTION, SOLUTION INTRAMUSCULAR; INTRAVENOUS EVERY 5 MIN PRN
Status: DISCONTINUED | OUTPATIENT
Start: 2024-03-13 | End: 2024-03-13 | Stop reason: HOSPADM

## 2024-03-13 RX ORDER — OXYCODONE HYDROCHLORIDE AND ACETAMINOPHEN 5; 325 MG/1; MG/1
1 TABLET ORAL EVERY 4 HOURS PRN
Status: DISCONTINUED | OUTPATIENT
Start: 2024-03-13 | End: 2024-03-14 | Stop reason: HOSPADM

## 2024-03-13 RX ORDER — MIDAZOLAM HYDROCHLORIDE 1 MG/ML
INJECTION INTRAMUSCULAR; INTRAVENOUS
Status: COMPLETED | OUTPATIENT
Start: 2024-03-13 | End: 2024-03-13

## 2024-03-13 RX ORDER — BUPIVACAINE HYDROCHLORIDE 7.5 MG/ML
INJECTION, SOLUTION INTRASPINAL PRN
Status: DISCONTINUED | OUTPATIENT
Start: 2024-03-13 | End: 2024-03-13 | Stop reason: SDUPTHER

## 2024-03-13 RX ORDER — ACETAMINOPHEN 500 MG
1000 TABLET ORAL ONCE
Status: COMPLETED | OUTPATIENT
Start: 2024-03-13 | End: 2024-03-13

## 2024-03-13 RX ORDER — ONDANSETRON 4 MG/1
4 TABLET, ORALLY DISINTEGRATING ORAL EVERY 8 HOURS PRN
Status: DISCONTINUED | OUTPATIENT
Start: 2024-03-13 | End: 2024-03-14 | Stop reason: HOSPADM

## 2024-03-13 RX ORDER — ONDANSETRON 2 MG/ML
4 INJECTION INTRAMUSCULAR; INTRAVENOUS
Status: DISCONTINUED | OUTPATIENT
Start: 2024-03-13 | End: 2024-03-13 | Stop reason: HOSPADM

## 2024-03-13 RX ORDER — ALBUTEROL SULFATE 90 UG/1
2 AEROSOL, METERED RESPIRATORY (INHALATION) 4 TIMES DAILY PRN
Status: DISCONTINUED | OUTPATIENT
Start: 2024-03-13 | End: 2024-03-14 | Stop reason: HOSPADM

## 2024-03-13 RX ORDER — DEXAMETHASONE SODIUM PHOSPHATE 4 MG/ML
INJECTION, SOLUTION INTRA-ARTICULAR; INTRALESIONAL; INTRAMUSCULAR; INTRAVENOUS; SOFT TISSUE
Status: COMPLETED
Start: 2024-03-13 | End: 2024-03-13

## 2024-03-13 RX ORDER — ROPIVACAINE HYDROCHLORIDE 5 MG/ML
INJECTION, SOLUTION EPIDURAL; INFILTRATION; PERINEURAL
Status: COMPLETED | OUTPATIENT
Start: 2024-03-13 | End: 2024-03-13

## 2024-03-13 RX ORDER — SODIUM CHLORIDE, SODIUM LACTATE, POTASSIUM CHLORIDE, CALCIUM CHLORIDE 600; 310; 30; 20 MG/100ML; MG/100ML; MG/100ML; MG/100ML
INJECTION, SOLUTION INTRAVENOUS CONTINUOUS
Status: DISCONTINUED | OUTPATIENT
Start: 2024-03-13 | End: 2024-03-13 | Stop reason: HOSPADM

## 2024-03-13 RX ORDER — PROPOFOL 10 MG/ML
INJECTION, EMULSION INTRAVENOUS PRN
Status: DISCONTINUED | OUTPATIENT
Start: 2024-03-13 | End: 2024-03-13 | Stop reason: SDUPTHER

## 2024-03-13 RX ORDER — LIDOCAINE HYDROCHLORIDE 10 MG/ML
INJECTION, SOLUTION EPIDURAL; INFILTRATION; INTRACAUDAL; PERINEURAL
Status: DISPENSED
Start: 2024-03-13 | End: 2024-03-13

## 2024-03-13 RX ORDER — FENTANYL CITRATE 50 UG/ML
INJECTION, SOLUTION INTRAMUSCULAR; INTRAVENOUS
Status: COMPLETED
Start: 2024-03-13 | End: 2024-03-13

## 2024-03-13 RX ORDER — SODIUM CHLORIDE 0.9 % (FLUSH) 0.9 %
5-40 SYRINGE (ML) INJECTION EVERY 12 HOURS SCHEDULED
Status: DISCONTINUED | OUTPATIENT
Start: 2024-03-13 | End: 2024-03-14 | Stop reason: HOSPADM

## 2024-03-13 RX ADMIN — FENTANYL CITRATE 50 MCG: 50 INJECTION, SOLUTION INTRAMUSCULAR; INTRAVENOUS at 10:30

## 2024-03-13 RX ADMIN — SODIUM CHLORIDE: 9 INJECTION, SOLUTION INTRAVENOUS at 16:26

## 2024-03-13 RX ADMIN — BUPIVACAINE HYDROCHLORIDE IN DEXTROSE 15 MG: 7.5 INJECTION, SOLUTION SUBARACHNOID at 11:19

## 2024-03-13 RX ADMIN — PROPOFOL 100 MCG/KG/MIN: 10 INJECTION, EMULSION INTRAVENOUS at 11:26

## 2024-03-13 RX ADMIN — ACETAMINOPHEN 1000 MG: 500 TABLET ORAL at 09:14

## 2024-03-13 RX ADMIN — SODIUM CHLORIDE 3000 MG: 900 INJECTION INTRAVENOUS at 16:26

## 2024-03-13 RX ADMIN — ACETAMINOPHEN 650 MG: 325 TABLET ORAL at 16:29

## 2024-03-13 RX ADMIN — SODIUM CHLORIDE 3000 MG: 900 INJECTION INTRAVENOUS at 11:14

## 2024-03-13 RX ADMIN — TRANEXAMIC ACID 1000 MG: 10 INJECTION, SOLUTION INTRAVENOUS at 11:25

## 2024-03-13 RX ADMIN — ROPIVACAINE HYDROCHLORIDE 30 ML: 5 INJECTION, SOLUTION EPIDURAL; INFILTRATION; PERINEURAL at 10:30

## 2024-03-13 RX ADMIN — SODIUM CHLORIDE, POTASSIUM CHLORIDE, SODIUM LACTATE AND CALCIUM CHLORIDE: 600; 310; 30; 20 INJECTION, SOLUTION INTRAVENOUS at 11:52

## 2024-03-13 RX ADMIN — PHENYLEPHRINE HYDROCHLORIDE 100 MCG/MIN: 10 INJECTION INTRAVENOUS at 11:41

## 2024-03-13 RX ADMIN — MIDAZOLAM 2 MG: 1 INJECTION INTRAMUSCULAR; INTRAVENOUS at 10:30

## 2024-03-13 RX ADMIN — MIDAZOLAM HYDROCHLORIDE 2 MG: 1 INJECTION INTRAMUSCULAR; INTRAVENOUS at 11:15

## 2024-03-13 RX ADMIN — ASPIRIN 325 MG: 81 TABLET, CHEWABLE ORAL at 18:53

## 2024-03-13 RX ADMIN — ONDANSETRON 4 MG: 2 INJECTION INTRAMUSCULAR; INTRAVENOUS at 11:15

## 2024-03-13 RX ADMIN — SODIUM CHLORIDE, POTASSIUM CHLORIDE, SODIUM LACTATE AND CALCIUM CHLORIDE: 600; 310; 30; 20 INJECTION, SOLUTION INTRAVENOUS at 16:26

## 2024-03-13 RX ADMIN — PHENYLEPHRINE HYDROCHLORIDE 100 MCG: 10 INJECTION INTRAVENOUS at 13:38

## 2024-03-13 RX ADMIN — SODIUM CHLORIDE, POTASSIUM CHLORIDE, SODIUM LACTATE AND CALCIUM CHLORIDE: 600; 310; 30; 20 INJECTION, SOLUTION INTRAVENOUS at 13:56

## 2024-03-13 RX ADMIN — DEXAMETHASONE SODIUM PHOSPHATE 4 MG: 4 INJECTION, SOLUTION INTRAMUSCULAR; INTRAVENOUS at 10:30

## 2024-03-13 RX ADMIN — DEXAMETHASONE SODIUM PHOSPHATE 4 MG: 4 INJECTION, SOLUTION INTRA-ARTICULAR; INTRALESIONAL; INTRAMUSCULAR; INTRAVENOUS; SOFT TISSUE at 11:15

## 2024-03-13 RX ADMIN — PHENYLEPHRINE HYDROCHLORIDE 200 MCG: 10 INJECTION INTRAVENOUS at 11:36

## 2024-03-13 RX ADMIN — SODIUM CHLORIDE, POTASSIUM CHLORIDE, SODIUM LACTATE AND CALCIUM CHLORIDE: 600; 310; 30; 20 INJECTION, SOLUTION INTRAVENOUS at 09:08

## 2024-03-13 RX ADMIN — GLYCOPYRROLATE 0.4 MG: 0.2 INJECTION INTRAMUSCULAR; INTRAVENOUS at 11:20

## 2024-03-13 RX ADMIN — PROPOFOL 50 MG: 10 INJECTION, EMULSION INTRAVENOUS at 11:24

## 2024-03-13 ASSESSMENT — PAIN SCALES - GENERAL
PAINLEVEL_OUTOF10: 0
PAINLEVEL_OUTOF10: 3
PAINLEVEL_OUTOF10: 4
PAINLEVEL_OUTOF10: 0

## 2024-03-13 ASSESSMENT — PAIN - FUNCTIONAL ASSESSMENT
PAIN_FUNCTIONAL_ASSESSMENT: ACTIVITIES ARE NOT PREVENTED
PAIN_FUNCTIONAL_ASSESSMENT: 0-10

## 2024-03-13 ASSESSMENT — PAIN DESCRIPTION - FREQUENCY: FREQUENCY: INTERMITTENT

## 2024-03-13 ASSESSMENT — PAIN DESCRIPTION - PAIN TYPE: TYPE: SURGICAL PAIN

## 2024-03-13 ASSESSMENT — PAIN DESCRIPTION - DESCRIPTORS: DESCRIPTORS: ACHING

## 2024-03-13 ASSESSMENT — PAIN DESCRIPTION - ORIENTATION: ORIENTATION: LEFT

## 2024-03-13 ASSESSMENT — PAIN DESCRIPTION - LOCATION: LOCATION: KNEE

## 2024-03-13 ASSESSMENT — PAIN DESCRIPTION - ONSET: ONSET: GRADUAL

## 2024-03-13 NOTE — ANESTHESIA PROCEDURE NOTES
Spinal Block    Patient location during procedure: OR  End time: 3/13/2024 11:19 AM  Reason for block: primary anesthetic  Staffing  Performed: resident/CRNA   Resident/CRNA: Jhonny Meade APRN - ANURADHA  Performed by: Jhonny Meade APRN - CRNA  Authorized by: Hari Nuno MD    Spinal Block  Patient position: sitting  Prep: Betadine  Patient monitoring: cardiac monitor, continuous pulse ox, continuous capnometry, frequent blood pressure checks and oxygen  Approach: midline  Location: L3/L4  Provider prep: mask, sterile gloves and sterile gown  Local infiltration: lidocaine  Needle  Needle type: pencil-tip   Needle gauge: 25 G  Needle length: 3.5 in  Assessment  Sensory level: T4  Swirl obtained: Yes  CSF: clear  Attempts: 1  Hemodynamics: stable  Preanesthetic Checklist  Completed: patient identified, IV checked, site marked, risks and benefits discussed, surgical/procedural consents, equipment checked, pre-op evaluation, timeout performed, anesthesia consent given, oxygen available, monitors applied/VS acknowledged, fire risk safety assessment completed and verbalized and blood product R/B/A discussed and consented

## 2024-03-13 NOTE — H&P
Chief Complaint   Patient presents with    Follow-up       Left knee         History of Present Illness:                             Negrito Nicole is a 63 y.o. male who presents today for evaluation of his worsening left knee pain stiffness and dysfunction.  He has had ongoing problems with his bilateral knees for over 3 years.  He has been through multiple conservative treatments in the past including rounds of injections.  The last injection performed to his left knee did not provide any significant pain relief.  He has constant deep aching pain present along the medial joint line which is worse with pushoff and weightbearing activities     He has been treated by myself in the past with conservative measures and he has been effective in losing weight.  Unfortunately even with weight loss his knees continue to give him trouble most significantly on the left side.  He is having limitations in his daily activities and having trouble being active and exercising the way he would like.  He has trouble even walking short distances because of the severity of the pain and dysfunction in his left knee predominantly.     He has been through stem cell treatment on the right knee which seems to have helped alleviate some of his symptoms in his right knee is currently doing well compared to his left.     His left knee has progressively worsened over the last several years and he was seen by another orthopedic surgeon who ordered an MRI.  He was found to have a large osteochondral lesion in the weightbearing portion of his medial femoral condyle and a associated medial meniscus tear with underlying degenerative joint disease at the knee.     He was recommended to have a minimally invasive procedure with knee arthroscopy and injection of phosphate into the osteochondral defect.  He is here today for another opinion and discussion of possible surgical options related to his progressive worsening left knee pain        Patient seen

## 2024-03-13 NOTE — CARE COORDINATION
CM reviewed chart and saw pt at bedside.  Pt lives with his spouse and son in 2 story home.  Pt has the ability to stay on first floor while he recovers.  Pt has standard walker.  Plan for patient is home w/ family and HHC.  CM provided HHC list.      The Plan for Transition of Care is related to the following treatment goals of Primary osteoarthritis of left knee [M17.12]  Chronic pain of left knee [M25.562, G89.29]

## 2024-03-13 NOTE — OP NOTE
Operative Note      Patient: Negrito Nicole  YOB: 1960  MRN: [id number]    Date of Procedure: 3/13/2024    Pre-Op Diagnosis Codes:     * Primary osteoarthritis of left knee [M17.12]     * Chronic pain of left knee [M25.562, G89.29]    Post-Op Diagnosis: Same       Procedure(s):  LEFT KNEE TOTAL ARTHROPLASTY ROBOTIC    Surgeon(s):  Thor Gross MD    Assistant:   Physician Assistant: Joe Dixon PA-C Greg Mann was present for the entirety of the procedure and vital for the performance of the procedure. Gary Dixon assisted with positioning, prepping, draping of the patient before the procedure and instrument manipulation, extremity repositioning during the procedure as well as wound closure, dressing application and brace application after the procedure. Please note that no intern, resident, or other hospital staff was available to assist during the surgery    Anesthesia: Spinal    Estimated Blood Loss (mL): less than 100     Complications: None    Specimens:   * No specimens in log *    Implants:  Implant Name Type Inv. Item Serial No.  Lot No. LRB No. Used Action   CEMENT BNE 40GM HI VISC RADPQ FOR REV SURG - [id number]  CEMENT BNE 40GM HI VISC RADPQ FOR REV SURG  [employer]- [id number] Left 2 Implanted   BASEPLATE TIB SZ 6 UNIV KNEE TRITANIUM TOT STBL HUSSEIN - [id number]  BASEPLATE TIB SZ 6 UNIV KNEE TRITANIUM TOT STBL HUSSEIN  [employer] [facility] [id number] Left 1 Implanted   COMPONENT FEM SZ 5 L ANT KNEE CRUCE RET HUSSEIN REFERENCING - [id number]  COMPONENT FEM SZ 5 L ANT KNEE CRUCE RET HUSSEIN REFERENCING  [employer] [facility] [id number] Left 1 Implanted   IMPLANT PAT [id number] X3 ASYM TRIATHLON - [id number]  IMPLANT PAT [id number] X3 ASYM TRIATHLON  [employer] [facility] [id number] Left 1 Implanted   INSERT TIB CR 6 9 MM KNEE 5/PK X3 TRIATHLON - [id number]  INSERT TIB CR 6 9 MM KNEE 5/PK X3 TRIATHLON  [employer] [facility] [id number] Left 1 Implanted

## 2024-03-13 NOTE — CONSULTS
V2.0  [facility] Consult Note      Name:  Negrito Nicole /Age/Sex: 1960  (63 y.o. male)   MRN & CSN:  [id number] & [id number] Encounter Date/Time: 3/13/2024 6:22 PM EDT   Location:  [facility] PCP: Roberto Gonzalez MD     Attending:Thor Gross MD  Consulting Provider: Harrison Santiago MD      Hospital Day: 1    Assessment and Recommendations   Negrito Nicole is a 63 y.o. male with pmh of asthma who presents with Primary osteoarthritis of left knee    Hospital Problems             Last Modified POA    * (Principal) Primary osteoarthritis of left knee 3/13/2024 Yes       Recommendations:   Osteoarthritis of left knee s/p left knee total arthroplasty: Management per Ortho  Asthma: On inhalers occasionally continue  History of migraine: Not taking and Topamax discontinue      Diet ADULT DIET; Regular   DVT Prophylaxis [] Lovenox, []  Heparin, [x] SCDs, [] Ambulation,  [] Eliquis, [] Xarelto   Code Status Full Code   Surrogate Decision Maker/ POA Self     Personally reviewed Lab Studies and Imaging       History From:    History obtained from the patient.     History of Present Illness:      Chief Complaint: Elective surgery for left knee    Negrito Nicole is a 63 y.o. male who presents with asthma presented for elective surgery had left knee total arthroplasty on 3/13 consulted hospital medicine for medical problems by Ortho.  Patient states he had a history of migraine but not taking any Topamax recently and he also has history of asthma but not had an attack since long time.  Complaint of pain in left knee, denies any other complaints at this time.      Review of Systems:      Pertinent positives and negatives discussed in HPI    Objective:     Intake/Output Summary (Last 24 hours) at 3/13/2024 1822  Last data filed at 3/13/2024 1356  Gross per 24 hour   Intake 2000 ml   Output --   Net 2000 ml      Vitals:   Vitals:    24 1500 24 1515 24 1545 24 1600   BP: 110/77 109/77 119/74  
Pt is currently grossly SBA for all mobility, including bed mobility, STS, functional transfers, gait training, and stair training. Pt demonstrates a moderate decline with his overall functional mobility, reduced strength, impaired balance, impaired gait, and reduced activity tolerance. Pt will continue to benefit from acute PT services with discharge home with outpatient PT and initial 24/7 support to maximize safety and reduce fall risk.    Complexity: moderate    Prognosis: Good, no significant barriers to participation at this time.   General Plan: 2 times a day 7 days a week       Equipment: none, pt has appropriate DME for home     Goals:  Short Term Goals  Time Frame for Short Term Goals: 1 week  Short Term Goal 1: Pt will be able to perform all aspects of bed mobility with Dennise  Short Term Goal 2: Pt will be able to perform STS using FWW with Dennise  Short Term Goal 3: Pt will be able to perform functional transfers using FWW with Dennise  Short Term Goal 4: Pt will be able to ambulate 400ft+ using FWW with Dennise  Short Term Goal 5: Pt will be able to ascend/descend 1 flight of stairs using RHR with Dennise       Treatment plan:  Bed mobility, transfers, balance, gait, TA, TX, neuro re-ed     Recommendations for NURSING mobility: ambulate using FWW with SBA     Time:   Time in: 1625  Time out: 1716  Timed treatment minutes: 40  Total time: 51    Electronically signed by:    Ortiz Cedillo PT, DPT  3/13/2024, 5:21 PM

## 2024-03-13 NOTE — ANESTHESIA PROCEDURE NOTES
Peripheral Block    Patient location during procedure: procedure area  Reason for block: post-op pain management and at surgeon's request  Start time: 3/13/2024 10:30 AM  End time: 3/13/2024 10:35 AM  Staffing  Performed: anesthesiologist   Anesthesiologist: Hari Nuno MD  Performed by: Hari Nuno MD  Authorized by: Hari Nuno MD    Preanesthetic Checklist  Completed: patient identified, IV checked, site marked, risks and benefits discussed, surgical/procedural consents, equipment checked, pre-op evaluation, timeout performed, anesthesia consent given, oxygen available, monitors applied/VS acknowledged, fire risk safety assessment completed and verbalized and blood product R/B/A discussed and consented  Peripheral Block   Patient position: supine  Prep: ChloraPrep  Provider prep: mask  Patient monitoring: cardiac monitor, continuous pulse ox, frequent blood pressure checks, IV access, oxygen and responsive to questions  Block type: Femoral  Adductor canal  Laterality: left  Injection technique: single-shot  Guidance: ultrasound guided  Local infiltration: lidocaine  Local infiltration: lidocaine    Needle   Needle type: insulated echogenic nerve stimulator needle   Needle gauge: 20 G  Needle length: 4cm.  Assessment   Injection assessment: negative aspiration for heme, no paresthesia on injection, local visualized surrounding nerve on ultrasound, no intravascular symptoms and low pressure verified by pressure monitor  Paresthesia pain: none  Slow fractionated injection: yes  Hemodynamics: stable  Outcomes: patient tolerated procedure well and uncomplicated    Medications Administered  fentaNYL (SUBLIMAZE) injection - IntraVENous   50 mcg - 3/13/2024 10:30:00 AM  midazolam (VERSED) injection 2 mg/2mL - IntraVENous   2 mg - 3/13/2024 10:30:00 AM  dexAMETHasone (DECADRON) injection 4 mg/mL - Perineural   4 mg - 3/13/2024 10:30:00 AM  ropivacaine (NAROPIN) injection 0.5% - Perineural   30

## 2024-03-14 VITALS
SYSTOLIC BLOOD PRESSURE: 132 MMHG | WEIGHT: 281.5 LBS | HEIGHT: 71 IN | TEMPERATURE: 97.9 F | OXYGEN SATURATION: 92 % | RESPIRATION RATE: 17 BRPM | BODY MASS INDEX: 39.41 KG/M2 | HEART RATE: 56 BPM | DIASTOLIC BLOOD PRESSURE: 79 MMHG

## 2024-03-14 PROBLEM — Z96.652 STATUS POST LEFT KNEE REPLACEMENT: Status: ACTIVE | Noted: 2024-03-14

## 2024-03-14 PROCEDURE — 94150 VITAL CAPACITY TEST: CPT

## 2024-03-14 PROCEDURE — 6360000002 HC RX W HCPCS: Performed by: ORTHOPAEDIC SURGERY

## 2024-03-14 PROCEDURE — G0378 HOSPITAL OBSERVATION PER HR: HCPCS

## 2024-03-14 PROCEDURE — 6370000000 HC RX 637 (ALT 250 FOR IP): Performed by: ORTHOPAEDIC SURGERY

## 2024-03-14 PROCEDURE — 97116 GAIT TRAINING THERAPY: CPT

## 2024-03-14 PROCEDURE — 97530 THERAPEUTIC ACTIVITIES: CPT

## 2024-03-14 PROCEDURE — 2580000003 HC RX 258: Performed by: ORTHOPAEDIC SURGERY

## 2024-03-14 PROCEDURE — 94761 N-INVAS EAR/PLS OXIMETRY MLT: CPT

## 2024-03-14 RX ORDER — DOCUSATE SODIUM 100 MG/1
100 CAPSULE, LIQUID FILLED ORAL DAILY PRN
Qty: 30 CAPSULE | Refills: 0 | Status: SHIPPED | OUTPATIENT
Start: 2024-03-14

## 2024-03-14 RX ORDER — ASPIRIN 325 MG
325 TABLET ORAL 2 TIMES DAILY
Qty: 30 TABLET | Refills: 0 | Status: SHIPPED | OUTPATIENT
Start: 2024-03-14

## 2024-03-14 RX ORDER — OXYCODONE HYDROCHLORIDE AND ACETAMINOPHEN 5; 325 MG/1; MG/1
1 TABLET ORAL EVERY 6 HOURS PRN
Qty: 28 TABLET | Refills: 0 | Status: SHIPPED | OUTPATIENT
Start: 2024-03-14 | End: 2024-03-21

## 2024-03-14 RX ORDER — TRAMADOL HYDROCHLORIDE 50 MG/1
50 TABLET ORAL EVERY 8 HOURS PRN
Qty: 20 TABLET | Refills: 0 | Status: SHIPPED | OUTPATIENT
Start: 2024-03-14 | End: 2024-03-21

## 2024-03-14 RX ADMIN — SODIUM CHLORIDE 3000 MG: 900 INJECTION INTRAVENOUS at 00:24

## 2024-03-14 RX ADMIN — SODIUM CHLORIDE 3000 MG: 900 INJECTION INTRAVENOUS at 09:18

## 2024-03-14 RX ADMIN — OXYCODONE AND ACETAMINOPHEN 1 TABLET: 5; 325 TABLET ORAL at 09:11

## 2024-03-14 RX ADMIN — ASPIRIN 325 MG: 81 TABLET, CHEWABLE ORAL at 09:12

## 2024-03-14 RX ADMIN — SODIUM CHLORIDE, POTASSIUM CHLORIDE, SODIUM LACTATE AND CALCIUM CHLORIDE: 600; 310; 30; 20 INJECTION, SOLUTION INTRAVENOUS at 06:29

## 2024-03-14 ASSESSMENT — PAIN DESCRIPTION - DESCRIPTORS: DESCRIPTORS: ACHING;DULL

## 2024-03-14 ASSESSMENT — PAIN DESCRIPTION - ORIENTATION: ORIENTATION: LEFT

## 2024-03-14 ASSESSMENT — PAIN - FUNCTIONAL ASSESSMENT: PAIN_FUNCTIONAL_ASSESSMENT: PREVENTS OR INTERFERES SOME ACTIVE ACTIVITIES AND ADLS

## 2024-03-14 ASSESSMENT — PAIN SCALES - WONG BAKER: WONGBAKER_NUMERICALRESPONSE: 2

## 2024-03-14 ASSESSMENT — PAIN SCALES - GENERAL: PAINLEVEL_OUTOF10: 6

## 2024-03-14 ASSESSMENT — PAIN DESCRIPTION - LOCATION: LOCATION: KNEE

## 2024-03-14 NOTE — ANESTHESIA POSTPROCEDURE EVALUATION
Department of Anesthesiology  Postprocedure Note    Patient: Negrito Nicole  MRN: [id number]  YOB: 1960  Date of evaluation: 3/14/2024    Procedure Summary       Date: 03/13/24 Room / Location: [address]    Anesthesia Start: 1113 Anesthesia Stop: 1416    Procedure: LEFT KNEE TOTAL ARTHROPLASTY ROBOTIC (Left: Knee) Diagnosis:       Primary osteoarthritis of left knee      Chronic pain of left knee      (Primary osteoarthritis of left knee [M17.12])      (Chronic pain of left knee [M25.562, G89.29])    Surgeons: Thor Gross MD Responsible Provider: Hari Nuno MD    Anesthesia Type: regional, spinal ASA Status: 3            Anesthesia Type: No value filed.    Antoine Phase I: Antoine Score: 10    Antoine Phase II:      Anesthesia Post Evaluation    Patient location during evaluation: bedside  Patient participation: complete - patient participated  Level of consciousness: awake  Pain score: 0  Airway patency: patent  Nausea & Vomiting: no nausea and no vomiting  Cardiovascular status: hemodynamically stable  Respiratory status: acceptable  Hydration status: euvolemic  Pain management: adequate    No notable events documented.

## 2024-03-14 NOTE — DISCHARGE INSTRUCTIONS
There is a watertight impervious tape like dressing over the incision.  You may shower and clean the leg as needed.  You may cover the area with a gauze bandage and tape as needed. You can leave the incision open to air after a 5 days if no drainage.   Remove the clear tape 2 weeks after surgery.      You can rewrap the Ace wrap to help control swelling of support of the knee as needed    Use ice as needed to help with pain and swelling.  Use the compression stockings for the first few 3 days to help control postoperative swelling and aid in circulation    Begin physical therapy as an outpatient a few days after surgery.  They should be calling you to set up the appointment.  If you have not heard from them within 2 days, call the office to ensure that therapy is scheduled    Taper off of the narcotic pain medication as pain allows.  You may substitute a dose of Tylenol or ibuprofen in place of the narcotic pain medication as the pain improves.    Take your blood thinner as prescribed for 2 weeks to help prevent blood clots in the leg.    Practice range of motion exercises multiple times a day as instructed by physical therapy.  You should practice fully straightening and stretching the back of the knee as well as practice fully bending and stretching the front of the knee.    Call the office if there is any concern for wound healing problems, drainage, severe swelling, discoloration, or other medical issues.

## 2024-03-14 NOTE — PROGRESS NOTES
Doing well postoperatively.    Pain controlled  Patient did well with physical therapy  Objective:   No data found.  Afebrile vital signs are stable    Physical Exam:     The patient is awake and alert  Resting comfortably in bed    Operative extremity:    The dressing is clean dry and intact.  Incision is intact with Dermabond dressing.  No erythema, drainage, or induration.  Calf is soft and nontender.  Sensation and motor function intact distally      LABS   CBC: No results for input(s): \"WBC\", \"HGB\", \"PLT\" in the last 72 hours.    Postoperative x-rays show well aligned prosthesis with no complications.    Assessment and Plan     1.  POD # 1 total knee replacement    1:  Physical therapy consult for mobilization   -Weight-bear as tolerated, progress as tolerated  2:  DVT prophylaxis   -Aspirin twice a day for 2 weeks  3:  Continue Pain Control   -Oral medications as needed, IV medication only for breakthrough pain  4.  Medical management per hospitalist service  5:  D/C Planning:    -Discharge to home later today if patient is mobilizing well with physical therapy and pain is well controlled.  -Follow-up in 2 weeks for x-rays and wound check.         Thor Gross MD     
4 Eyes Skin Assessment     NAME:  Negrito Nicole  YOB: 1960  MEDICAL RECORD NUMBER:  [id number]    The patient is being assessed for  Admission    I agree that at least one RN has performed a thorough Head to Toe Skin Assessment on the patient. ALL assessment sites listed below have been assessed.      Areas assessed by both nurses:    Head, Face, Ears, Shoulders, Back, Chest, Arms, Elbows, Hands, Sacrum. Buttock, Coccyx, Ischium, Legs. Feet and Heels, and Under Medical Devices         Does the Patient have a Wound? Yes wound(s) were present on assessment. LDA wound assessment was Initiated and completed by RN       [place]a Prevention initiated by RN: No  Wound Care Orders initiated by RN: No    Pressure Injury (Stage 3,4, Unstageable, DTI, NWPT, and Complex wounds) if present, place Wound referral order by RN under : No    New Ostomies, if present place, Ostomy referral order under : No     Nurse 1 eSignature: Electronically signed by Selina Reeves RN on 3/13/24 at 4:18 PM EDT    **SHARE this note so that the co-signing nurse can place an eSignature**    Nurse 2 eSignature: Electronically signed by Liliya Valladares RN on 3/13/24 at 7:41 PM EDT   
Outpatient Pharmacy Progress Note for Meds-to-Beds    Total number of Prescriptions Filled: 4  The following medications were dispensed to the patient during the discharge process:  aspirin  docusate sodium  oxyCODONE-acetaminophen  traMADol    Additional Documentation:  Patient picked-up the medication(s) in the OP Pharmacy      Thank you for letting us serve your patients.  [place] [id number]    Phone: [phone]    Fax: [phone]        
Patient/spouse notified of surgery time at [facility] 3/13/24 1000 arrival 0800, NPO instructions and DOS meds reviewed   
Physical Therapy    Physical Therapy Treatment Note  Name: Negrito Nicole MRN: [id number] :   1960   Date:  3/14/2024   Admission Date: 3/13/2024 [facility]   Discharge recommendation: outpatient PT with initial  support     Restrictions/Precautions:  general precautions, fall risk. WBAT LLE     Communication with other providers:  RN reports pt is appropriate to participate with PT    Subjective:  Patient states:  \"my knee is starting to feel better the more I walk\"  Pain:   Location, Type, Intensity (0/10 to 10/10):  pt reports 5-6/10 pain to L  knee    Objective:    Observation:  pt found resting in bed upon arrival, pleasant and agreeable to participate with PT  Objective Measures:  on room air     Treatment, including education/measures:  Supine to sit: SBA using bed rail with HOB elevated. Scooted to EOB with SBA.     Sit to supine: SBA. Repositioned self in bed    Stand balance: good, SBA. Pt was able to perform toileting without any instability    Sit to stand: SBA using FWW. Cues provided for hand placement    Stand to sit: SBA using BUE for support. Demo fair eccentric control    Functional transfers: SBA using FWW    Gait: 400ft using FWW with SBA. Gait speed is slow to moderate with mixed step to and continuous gait pattern. Cues provided to slowly increase WB to LLE and to increase heel to toe with noted improvement. Pt demo postural swaying with increased fatigue. Cues also provided to reduce step length for pain management. No noted instability, LOB, or knee buckling.    Stairs: ascend/descend x1 flight of stairs using RHR with CGA and HHA. Ascend/descend x3 steps using LRH and FWW with CGA.     Education: reviewed HEP, how to ascend/descend steps using FWW without handrails    Safety: gait belt used, call light/phone placed in reach, bed alarm intact, pt returned to supine, all needs met.    Assessment / Impression:    Pt progressing well with his overall functional mobility. 
Received consult, reviewed chart full note will follow  
2 times per day    ceFAZolin (ANCEF) IVPB  3,000 mg IntraVENous Q8H    aspirin  325 mg Oral BID      Infusions:    lactated ringers IV soln 75 mL/hr at 03/14/24 0629    sodium chloride 25 mL/hr at 03/13/24 1626     PRN Meds: albuterol sulfate HFA, 2 puff, 4x Daily PRN  sodium chloride flush, 5-40 mL, PRN  sodium chloride, , PRN  acetaminophen, 650 mg, Q4H PRN  HYDROmorphone, 0.25 mg, Q3H PRN   Or  HYDROmorphone, 0.5 mg, Q3H PRN  ondansetron, 4 mg, Q8H PRN   Or  ondansetron, 4 mg, Q6H PRN  oxyCODONE-acetaminophen, 1 tablet, Q4H PRN        Labs    No results found for this or any previous visit (from the past 24 hour(s)).     Imaging/Diagnostics Last 24 Hours   XR KNEE LEFT (1-2 VIEWS)    Result Date: 3/14/2024  HISTORY: Postop left knee arthroplasty. COMPARISON: Left knee radiographs 9/14/2023. FINDINGS: AP and lateral views of the left knee were obtained. There is a 9 constrained left total knee arthroplasty which is anatomically aligned. No periprosthetic fracture. Small amount of gas within the joint space consistent with postop change.     1. Uncomplicated left knee arthroplasty.      Electronically signed by HENRY Britt CNP on 3/14/2024 at 8:57 AM

## 2024-03-14 NOTE — DISCHARGE SUMMARY
DISCHARGE SUMMARY:  Thor Gross MD, MD     Date of Admission:      3/13/2024  Date of Discharge:    3/14/2024     Admission Diagnosis   Primary osteoarthritis of left knee [M17.12]  Chronic pain of left knee [M25.562, G89.29]   Discharge Diagnosis   Same    Procedure:    Left Total knee replacement 3/13/2024    Consultations:  IP CONSULT TO HOSPITALIST  IP CONSULT TO CASE MANAGEMENT  IP CONSULT TO HOME CARE NEEDS    Brief history and hospital stay.    Negrito Nicole is a 63 y.o. male who underwent total knee replacement procedure without complication.  Negrito Nicole was admitted to the floor following surgery.    Appropriate consults were obtained as outlined in progress notes. The patient’s diet was advanced as tolerated.  The patient remained hemodynamically stable and neurovascularly intact in the lower extremity throughout the hospital course.    On the day of discharge the patient's calf remained soft and showed no evidence of DVT.      Physical therapy and occupational therapy were consulted.  The patient progressed well with PT/OT throughout the stay.      DVT prophylaxis was initiated and will continue for 2 weeks.    The patients pain was controlled initially with IV pain medications and then was transitioned to oral pain medications prior to discharge    The patient was discharged to Home and will continue to follow the precautions outlined to them by us and PT/OT.     Condition on Discharge: Stable    Medications       Medication List        START taking these medications      aspirin 325 MG tablet  Commonly known as: Lucy Aspirin  Take 1 tablet by mouth in the morning and at bedtime     docusate sodium 100 MG capsule  Commonly known as: COLACE  Take 1 capsule by mouth daily as needed for Constipation     oxyCODONE-acetaminophen 5-325 MG per tablet  Commonly known as: Percocet  Take 1 tablet by mouth every 6 hours as needed for Pain for up to 7 days. Intended

## 2024-03-14 NOTE — CARE COORDINATION
This RN case manager to the bedside to discuss HHC with patient. He does not want HHC and would like to proceed with attending outpatient therapy. Patient states he has walker at home. Denies any other needs at this time. CM available.

## 2024-03-15 ENCOUNTER — TELEPHONE (OUTPATIENT)
Dept: ORTHOPEDIC SURGERY | Age: 64
End: 2024-03-15

## 2024-03-15 NOTE — TELEPHONE ENCOUNTER
Pt's wife called stating that pt is in severe pain. I recommended that he utilize ice since at this time he has not. The machine to not be working effectively/faulty. I informed the pt to call the number listed on the ice machine for assistance. I also advised the the pt to use ibuprofen for breakthrough pain in-between the percocet dosage.

## 2024-03-18 DIAGNOSIS — Z96.652 S/P TOTAL KNEE ARTHROPLASTY, LEFT: Primary | ICD-10-CM

## 2024-03-19 ENCOUNTER — HOSPITAL ENCOUNTER (OUTPATIENT)
Dept: PHYSICAL THERAPY | Age: 64
Setting detail: THERAPIES SERIES
Discharge: HOME | End: 2024-03-19
Payer: COMMERCIAL

## 2024-03-19 PROCEDURE — 97110 THERAPEUTIC EXERCISES: CPT

## 2024-03-19 PROCEDURE — 97162 PT EVAL MOD COMPLEX 30 MIN: CPT

## 2024-03-19 NOTE — PROGRESS NOTES
L LE strength at least 4+/5 in order to improve strength.  Long term goal 4:  Pt will demonstrate the ability to safely and I ambulate 2 laps in the clinic with no AD in order to improve functional mobility.   Long term goal 5: Pt will demonstrate a LEFs score of no more than 50% disability in order to improve quality of life. .    Note: Goals, frequency, plan, and recommendations will be updated as needed.   Goals and treatment plan discussed with family and mutually agreed upon. YES   Will discharge patient when therapy goals have been met or when therapy is no longer deemed necessary.     This plan was reviewed with the patient/family and they were in agreement.    Electronically signed by:  Ekta King, PT,DPT [id number] Date: 3/19/2024, Time: 10:28 AM      I certify that the above patient is under my care and requires the above skilled services. These professional services are to be provided from an established plan, reviewed by me at least every 90 days. These services are related to the diagnosis stated above and are medically necessary.    Date last seen by physician:_________________________________________________    Physician Signature:____________________________________________Date:_______________

## 2024-03-19 NOTE — FLOWSHEET NOTE
Outpatient Physical Therapy  [place]           [] Phone: [phone]   Fax: [phone]  [place]           [x] Phone: [phone]   Fax: [phone]        Physical Therapy Daily Treatment Note  Date:  3/19/2024    Patient Name:  Negrito Nicole    :  1960  MRN: [id number]  Restrictions/Precautions: fall risk  Diagnosis:   S/P total knee arthroplasty, left [Z96.652]    Date of Injury/Surgery: Pt had a L TKA 3/13/24. He reports he stayed 1 night at [facility] and then went home Thursday.    Treatment Diagnosis:   M62.81 muscle weakness, R26.89 abnormality of gait   Insurance/Certification information:  Medical [place]  Referring Physician:  Thor Gross MD     PCP: Roberto Gonzalez MD  Plan of care signed (Y/N):  summer co sign sent  Outcome Measure: LEFs:  = 11.25% ability = 88.75% disability    Visit# / total visits:    per year  Pain level: 5-610   Goals:     Patient goals:  \"cessation of pain and enough mobility to do normal activities.\"     Short term goals to be achieved by 2024:  Short term goal 1: Pt will report compliance with current HEP as prescribed in order to improve ROM and strength.   Short term goal 2: Pt will demonstrate AAROM L knee flexion to at least 120 degrees in order to improve ROM.  Short term goal 3: Pt will demonstrate AROM L knee flexion to at least 110 degrees in order to improve ROM.  Short term goal 4: Pt will demonstrate L LE strength at least grossly 3/5 in order to improve strength.   Short term goal 5: Pt will demonstrate  the ability to safely and I lift L LE onto and off plinth with no external A in order to improve strength.   Short term goal 6: Pt will demonstrate the ability to safely ambulate 2 laps in the clinic with a SPC and supervision in order to improve functional mobility.   Short term goal 7: Pt will demonstrate a LEFs score of no more than 76% disability in order to improve quality of life. .     Subjective:  See eval     Any

## 2024-03-21 ENCOUNTER — TELEPHONE (OUTPATIENT)
Dept: ORTHOPEDIC SURGERY | Age: 64
End: 2024-03-21

## 2024-03-21 ENCOUNTER — HOSPITAL ENCOUNTER (OUTPATIENT)
Dept: PHYSICAL THERAPY | Age: 64
Setting detail: THERAPIES SERIES
Discharge: HOME | End: 2024-03-21
Payer: COMMERCIAL

## 2024-03-21 PROCEDURE — 97110 THERAPEUTIC EXERCISES: CPT

## 2024-03-21 NOTE — FLOWSHEET NOTE
Outpatient Physical Therapy  [place]           [] Phone: [phone]   Fax: [phone]  [place]           [x] Phone: [phone]   Fax: [phone]        Physical Therapy Daily Treatment Note  Date:  3/21/2024    Patient Name:  Negrito Nicole    :  1960  MRN: [id number]  Restrictions/Precautions: fall risk  Diagnosis:   S/P total knee arthroplasty, left [Z96.652]    Date of Injury/Surgery: Pt had a L TKA 3/13/24. He reports he stayed 1 night at [facility] and then went home Thursday.    Treatment Diagnosis:   M62.81 muscle weakness, R26.89 abnormality of gait   Insurance/Certification information:  Medical [place]  Referring Physician:  Thor Gross MD     PCP: Roberto Gonzalez MD  Plan of care signed (Y/N):  summer co sign sent  Outcome Measure: LEFs:  = 11.25% ability = 88.75% disability    Visit# / total visits:    per year  Pain level: 5-610   Goals:     Patient goals:  \"cessation of pain and enough mobility to do normal activities.\"     Short term goals to be achieved by 2024:  Short term goal 1: Pt will report compliance with current HEP as prescribed in order to improve ROM and strength.   Short term goal 2: Pt will demonstrate AAROM L knee flexion to at least 120 degrees in order to improve ROM.  Short term goal 3: Pt will demonstrate AROM L knee flexion to at least 110 degrees in order to improve ROM.  Short term goal 4: Pt will demonstrate L LE strength at least grossly 3/5 in order to improve strength.   Short term goal 5: Pt will demonstrate  the ability to safely and I lift L LE onto and off plinth with no external A in order to improve strength.   Short term goal 6: Pt will demonstrate the ability to safely ambulate 2 laps in the clinic with a SPC and supervision in order to improve functional mobility.   Short term goal 7: Pt will demonstrate a LEFs score of no more than 76% disability in order to improve quality of life. .     Subjective:  Patient rates his

## 2024-03-22 DIAGNOSIS — Z96.652 STATUS POST LEFT KNEE REPLACEMENT: Primary | ICD-10-CM

## 2024-03-22 RX ORDER — OXYCODONE HYDROCHLORIDE AND ACETAMINOPHEN 5; 325 MG/1; MG/1
1 TABLET ORAL EVERY 6 HOURS PRN
Qty: 28 TABLET | Refills: 0 | Status: SHIPPED | OUTPATIENT
Start: 2024-03-22 | End: 2024-03-29

## 2024-03-22 NOTE — TELEPHONE ENCOUNTER
Patient called requesting refill of Percocet. Had L TKA on 3/13/24. Last refill on 3/14/24, 7 day supply.     Please advise.

## 2024-03-25 ENCOUNTER — HOSPITAL ENCOUNTER (OUTPATIENT)
Dept: PHYSICAL THERAPY | Age: 64
Setting detail: THERAPIES SERIES
Discharge: HOME | End: 2024-03-25
Payer: COMMERCIAL

## 2024-03-25 PROCEDURE — 97110 THERAPEUTIC EXERCISES: CPT

## 2024-03-25 NOTE — FLOWSHEET NOTE
Outpatient Physical Therapy  [place]           [] Phone: [phone]   Fax: [phone]  [place]           [x] Phone: [phone]   Fax: [phone]        Physical Therapy Daily Treatment Note  Date:  3/25/2024    Patient Name:  Negrito Nicole    :  1960  MRN: [id number]  Restrictions/Precautions: fall risk  Diagnosis:   S/P total knee arthroplasty, left [Z96.652]    Date of Injury/Surgery: Pt had a L TKA 3/13/24. He reports he stayed 1 night at [facility] and then went home Thursday.    Treatment Diagnosis:   M62.81 muscle weakness, R26.89 abnormality of gait   Insurance/Certification information:  Medical [place]  Referring Physician:  Thor Gross MD     PCP: Roberto Gonzalez MD  Plan of care signed (Y/N):  summer co sign sent  Outcome Measure: LEFs:  = 11.25% ability = 88.75% disability    Visit# / total visits:   3/20 per year  Pain level: 5-610   Goals:     Patient goals:  \"cessation of pain and enough mobility to do normal activities.\"     Short term goals to be achieved by 2024:  Short term goal 1: Pt will report compliance with current HEP as prescribed in order to improve ROM and strength.   Short term goal 2: Pt will demonstrate AAROM L knee flexion to at least 120 degrees in order to improve ROM.  Short term goal 3: Pt will demonstrate AROM L knee flexion to at least 110 degrees in order to improve ROM.  Short term goal 4: Pt will demonstrate L LE strength at least grossly 3/5 in order to improve strength.   Short term goal 5: Pt will demonstrate  the ability to safely and I lift L LE onto and off plinth with no external A in order to improve strength.   Short term goal 6: Pt will demonstrate the ability to safely ambulate 2 laps in the clinic with a SPC and supervision in order to improve functional mobility.   Short term goal 7: Pt will demonstrate a LEFs score of no more than 76% disability in order to improve quality of life. .     Subjective:  Patient rates his

## 2024-03-28 ENCOUNTER — HOSPITAL ENCOUNTER (OUTPATIENT)
Dept: PHYSICAL THERAPY | Age: 64
Setting detail: THERAPIES SERIES
Discharge: HOME | End: 2024-03-28
Payer: COMMERCIAL

## 2024-03-28 PROCEDURE — 97110 THERAPEUTIC EXERCISES: CPT

## 2024-03-28 NOTE — FLOWSHEET NOTE
pattern with cane.       Plan for Next Session: continue per POC     Time In / Time Out: 4:52-5:35    Timed Code/Total Treatment Minutes:  43' 3 TE    Next Progress Note due:  4/20/24    Plan of Care Interventions:  [] Therapeutic Exercise  [] Modalities:  [] Therapeutic Activity     [] Ultrasound  [] Estim  [] Gait Training      [] Cervical Traction [] Lumbar Traction  [] Neuromuscular Re-education    [] Cold/hotpack [] Iontophoresis   [] Instruction in HEP      [] Vasopneumatic   [] Dry Needling    [] Manual Therapy               [] Aquatic Therapy            Electronically signed by:  Ekta King, PT, DPT [id number]/  Tammy Coronado PTA 3/28/2024, 4:54 PM

## 2024-04-02 ENCOUNTER — HOSPITAL ENCOUNTER (OUTPATIENT)
Dept: PHYSICAL THERAPY | Age: 64
Discharge: HOME | End: 2024-04-02

## 2024-04-04 ENCOUNTER — OFFICE VISIT (OUTPATIENT)
Dept: ORTHOPEDIC SURGERY | Age: 64
End: 2024-04-04

## 2024-04-04 ENCOUNTER — HOSPITAL ENCOUNTER (OUTPATIENT)
Dept: PHYSICAL THERAPY | Age: 64
Setting detail: THERAPIES SERIES
Discharge: HOME | End: 2024-04-04
Payer: COMMERCIAL

## 2024-04-04 VITALS — RESPIRATION RATE: 14 BRPM

## 2024-04-04 DIAGNOSIS — Z96.652 STATUS POST LEFT KNEE REPLACEMENT: Primary | ICD-10-CM

## 2024-04-04 PROCEDURE — 99024 POSTOP FOLLOW-UP VISIT: CPT

## 2024-04-04 PROCEDURE — 97110 THERAPEUTIC EXERCISES: CPT

## 2024-04-04 NOTE — PROGRESS NOTES
4/4/2024   Chief Complaint   Patient presents with    Post-Op Check     Left TKA        History of Present Illness:                             Negrito Nicole is a 63 y.o. male returning to the office today for continued postoperative management regarding his left TKA.  Patient states he appears to doing well with his healing process.  He notes continued pain especially at night and with flexion maneuvers.  Patient states the pain is on the medial aspect of the knee and quadricep area.  Patient denies any signs of infection from his incision area.  He denies any distal paresthesias or calf pain.  Patient notes persistent superficial numbness along the lateral knee.    Medical History  Patient's medications, allergies, past medical, surgical, social and family histories were reviewed and updated as appropriate.    Review of Systems   Musculoskeletal:  Positive for arthralgias, gait problem and joint swelling. Negative for back pain, myalgias, neck pain and neck stiffness.                                               Examination:  General Exam:  Vitals: Resp 14    Physical Exam  Constitutional:       General: He is not in acute distress.     Appearance: Normal appearance. He is obese. He is not ill-appearing.   HENT:      Head: Normocephalic and atraumatic.      Nose: No rhinorrhea.   Eyes:      General: No scleral icterus.        Right eye: No discharge.         Left eye: No discharge.   Pulmonary:      Effort: Pulmonary effort is normal. No respiratory distress.      Breath sounds: No stridor.   Musculoskeletal:      Cervical back: Normal range of motion.      Comments: Left knee exam: Patient left knee appears with a well-approximated anterior incision that shows no signs of infection.  Patient able to perform full knee extension and 110 degrees of knee flexion.  No joint laxity with varus or valgus stress.  Popliteal pulse 2+, Homans' sign negative.  Sensation light touch intact throughout all surfaces of the

## 2024-04-04 NOTE — FLOWSHEET NOTE
Outpatient Physical Therapy  [place]           [] Phone: [phone]   Fax: [phone]  [place]           [x] Phone: [phone]   Fax: [phone]        Physical Therapy Daily Treatment Note  Date:  2024    Patient Name:  Negrito Nicole    :  1960  MRN: [id number]  Restrictions/Precautions: fall risk  Diagnosis:   S/P total knee arthroplasty, left [Z96.652]    Date of Injury/Surgery: Pt had a L TKA 3/13/24. He reports he stayed 1 night at [facility] and then went home Thursday.    Treatment Diagnosis:   M62.81 muscle weakness, R26.89 abnormality of gait   Insurance/Certification information:  Medical [place]  Referring Physician:  Thor Gross MD     PCP: Roberto Gonzalez MD  Plan of care signed (Y/N):  summer co sign sent  Outcome Measure: LEFs:  = 11.25% ability = 88.75% disability    Visit# / total visits:    per year  Pain level: 6/10   Goals:     Patient goals:  \"cessation of pain and enough mobility to do normal activities.\"     Short term goals to be achieved by 2024:  Short term goal 1: Pt will report compliance with current HEP as prescribed in order to improve ROM and strength.   Short term goal 2: Pt will demonstrate AAROM L knee flexion to at least 120 degrees in order to improve ROM.  Short term goal 3: Pt will demonstrate AROM L knee flexion to at least 110 degrees in order to improve ROM.  Short term goal 4: Pt will demonstrate L LE strength at least grossly 3/5 in order to improve strength.   Short term goal 5: Pt will demonstrate  the ability to safely and I lift L LE onto and off plinth with no external A in order to improve strength.   Short term goal 6: Pt will demonstrate the ability to safely ambulate 2 laps in the clinic with a SPC and supervision in order to improve functional mobility.   Short term goal 7: Pt will demonstrate a LEFs score of no more than 76% disability in order to improve quality of life. .     Subjective:  Had his follow up with

## 2024-04-05 RX ORDER — HYDROCODONE BITARTRATE AND ACETAMINOPHEN 5; 325 MG/1; MG/1
1 TABLET ORAL EVERY 8 HOURS PRN
Qty: 21 TABLET | Refills: 0 | Status: SHIPPED | OUTPATIENT
Start: 2024-04-05 | End: 2024-04-12

## 2024-04-05 ASSESSMENT — ENCOUNTER SYMPTOMS: BACK PAIN: 0

## 2024-04-09 ENCOUNTER — HOSPITAL ENCOUNTER (OUTPATIENT)
Dept: PHYSICAL THERAPY | Age: 64
Setting detail: THERAPIES SERIES
Discharge: HOME | End: 2024-04-09
Payer: COMMERCIAL

## 2024-04-09 PROCEDURE — 97110 THERAPEUTIC EXERCISES: CPT

## 2024-04-09 NOTE — FLOWSHEET NOTE
prognosis, and HEP. Pt educated on icing throughout the day for 20-30 mins and frequently ambulating.     Home Exercise Program:  3/19 - pt to continue current HEP 2-3x/day.    Manual Treatments:  none, begin patellar mobs next session.     Modalities:  none    Communication with other providers:  summer co sign sent    Assessment:  (Response towards treatment session) (Pain Rating)  Rated pain 0/10 after treatment.  Mod A for first leg raise in the set, but improved control after that.  Continues to demonstrate improving knee flexion ROM.  Aly continue to benefit from therapy to progress ROM and strengthening.       Plan for Next Session: continue per POC     Time In / Time Out: 4:07-4:50 pm    Timed Code/Total Treatment Minutes:  43'' 3 TE    Next Progress Note due:  4/20/24    Plan of Care Interventions:  [] Therapeutic Exercise  [] Modalities:  [] Therapeutic Activity     [] Ultrasound  [] Estim  [] Gait Training      [] Cervical Traction [] Lumbar Traction  [] Neuromuscular Re-education    [] Cold/hotpack [] Iontophoresis   [] Instruction in HEP      [] Vasopneumatic   [] Dry Needling    [] Manual Therapy               [] Aquatic Therapy            Electronically signed by:  Ekta King, PT, DPT [id number]  4/9/2024, 4:10 PM

## 2024-04-10 DIAGNOSIS — K76.0 NAFLD (NONALCOHOLIC FATTY LIVER DISEASE): Primary | ICD-10-CM

## 2024-04-10 RX ORDER — ASPIRIN 81 MG/1
81 TABLET ORAL DAILY
Qty: 90 TABLET | Refills: 3 | Status: SHIPPED | OUTPATIENT
Start: 2024-04-15

## 2024-04-12 ENCOUNTER — HOSPITAL ENCOUNTER (OUTPATIENT)
Dept: PHYSICAL THERAPY | Age: 64
Setting detail: THERAPIES SERIES
Discharge: HOME | End: 2024-04-12
Payer: COMMERCIAL

## 2024-04-12 PROCEDURE — 97110 THERAPEUTIC EXERCISES: CPT

## 2024-04-12 NOTE — FLOWSHEET NOTE
but improved control after that.  Continues to demonstrate improving knee flexion ROM.  Will continue to benefit from therapy to progress ROM and strengthening.       Plan for Next Session: continue per POC     Time In / Time Out: 4:00-4:42 pm     Timed Code/Total Treatment Minutes:  42'' 3 TE    Next Progress Note due:  4/20/24    Plan of Care Interventions:  [] Therapeutic Exercise  [] Modalities:  [] Therapeutic Activity     [] Ultrasound  [] Estim  [] Gait Training      [] Cervical Traction [] Lumbar Traction  [] Neuromuscular Re-education    [] Cold/hotpack [] Iontophoresis   [] Instruction in HEP      [] Vasopneumatic   [] Dry Needling    [] Manual Therapy               [] Aquatic Therapy            Electronically signed by:  Ekta King PT, DPT [id number]  4/12/2024, 4:02 PM

## 2024-04-15 ENCOUNTER — HOSPITAL ENCOUNTER (OUTPATIENT)
Dept: PHYSICAL THERAPY | Age: 64
Setting detail: THERAPIES SERIES
Discharge: HOME | End: 2024-04-15
Payer: COMMERCIAL

## 2024-04-15 PROCEDURE — 97110 THERAPEUTIC EXERCISES: CPT

## 2024-04-15 NOTE — FLOWSHEET NOTE
Outpatient Physical Therapy  [place]           [] Phone: [phone]   Fax: [phone]  [place]           [x] Phone: [phone]   Fax: [phone]        Physical Therapy Daily Treatment Note  Date:  4/15/2024    Patient Name:  Negrito Nicole    :  1960  MRN: [id number]  Restrictions/Precautions: fall risk  Diagnosis:   S/P total knee arthroplasty, left [Z96.652]    Date of Injury/Surgery: Pt had a L TKA 3/13/24. He reports he stayed 1 night at [facility] and then went home Thursday.    Treatment Diagnosis:   M62.81 muscle weakness, R26.89 abnormality of gait   Insurance/Certification information:  Medical [place]  Referring Physician:  Thor Gross MD     PCP: Roberto Gonzalez MD  Plan of care signed (Y/N):  summer co sign sent  Outcome Measure: LEFs:  = 11.25% ability = 88.75% disability    Visit# / total visits:    per year  Pain level: 5/10   Goals:     Patient goals:  \"cessation of pain and enough mobility to do normal activities.\"     Short term goals to be achieved by 2024:  Short term goal 1: Pt will report compliance with current HEP as prescribed in order to improve ROM and strength.   Short term goal 2: Pt will demonstrate AAROM L knee flexion to at least 120 degrees in order to improve ROM.  Short term goal 3: Pt will demonstrate AROM L knee flexion to at least 110 degrees in order to improve ROM.  Short term goal 4: Pt will demonstrate L LE strength at least grossly 3/5 in order to improve strength.   Short term goal 5: Pt will demonstrate  the ability to safely and I lift L LE onto and off plinth with no external A in order to improve strength.   Short term goal 6: Pt will demonstrate the ability to safely ambulate 2 laps in the clinic with a SPC and supervision in order to improve functional mobility.   Short term goal 7: Pt will demonstrate a LEFs score of no more than 76% disability in order to improve quality of life. .     Subjective:  Patient rates his

## 2024-04-16 ENCOUNTER — PATIENT MESSAGE (OUTPATIENT)
Dept: ORTHOPEDIC SURGERY | Age: 64
End: 2024-04-16

## 2024-04-17 ENCOUNTER — HOSPITAL ENCOUNTER (OUTPATIENT)
Dept: PHYSICAL THERAPY | Age: 64
Setting detail: THERAPIES SERIES
Discharge: HOME | End: 2024-04-17
Payer: COMMERCIAL

## 2024-04-17 DIAGNOSIS — Z96.652 STATUS POST LEFT KNEE REPLACEMENT: Primary | ICD-10-CM

## 2024-04-17 PROCEDURE — 97110 THERAPEUTIC EXERCISES: CPT

## 2024-04-17 PROCEDURE — 97530 THERAPEUTIC ACTIVITIES: CPT

## 2024-04-17 RX ORDER — HYDROCODONE BITARTRATE AND ACETAMINOPHEN 5; 325 MG/1; MG/1
1 TABLET ORAL 2 TIMES DAILY PRN
Qty: 14 TABLET | Refills: 0 | Status: SHIPPED | OUTPATIENT
Start: 2024-04-17 | End: 2024-04-24

## 2024-04-17 NOTE — FLOWSHEET NOTE
Outpatient Physical Therapy  [place]           [] Phone: [phone]   Fax: [phone]  [place]           [x] Phone: [phone]   Fax: [phone]        Physical Therapy Daily Treatment Note  Date:  2024    Patient Name:  Negrito Niocle    :  1960  MRN: [id number]  Restrictions/Precautions: fall risk  Diagnosis:   S/P total knee arthroplasty, left [Z96.652]    Date of Injury/Surgery: Pt had a L TKA 3/13/24. He reports he stayed 1 night at [facility] and then went home Thursday.    Treatment Diagnosis:   M62.81 muscle weakness, R26.89 abnormality of gait   Insurance/Certification information:  Medical [place]  Referring Physician:  Thor Gross MD     PCP: Roberto Gonzalez MD  Plan of care signed (Y/N):  summer co sign sent  Outcome Measure: LEFs:  = 11.25% ability = 88.75% disability    Visit# / total visits:    per year  Pain level: 6/10   Goals:     Patient goals:  \"cessation of pain and enough mobility to do normal activities.\"     Short term goals to be achieved by 2024:  Short term goal 1: Pt will report compliance with current HEP as prescribed in order to improve ROM and strength.   Short term goal 2: Pt will demonstrate AAROM L knee flexion to at least 120 degrees in order to improve ROM.  Short term goal 3: Pt will demonstrate AROM L knee flexion to at least 110 degrees in order to improve ROM.  Short term goal 4: Pt will demonstrate L LE strength at least grossly 3/5 in order to improve strength.   Short term goal 5: Pt will demonstrate  the ability to safely and I lift L LE onto and off plinth with no external A in order to improve strength.   Short term goal 6: Pt will demonstrate the ability to safely ambulate 2 laps in the clinic with a SPC and supervision in order to improve functional mobility.   Short term goal 7: Pt will demonstrate a LEFs score of no more than 76% disability in order to improve quality of life. .     Subjective:  Patient reports of

## 2024-04-22 ENCOUNTER — HOSPITAL ENCOUNTER (OUTPATIENT)
Dept: PHYSICAL THERAPY | Age: 64
Discharge: HOME | End: 2024-04-22

## 2024-04-22 NOTE — FLOWSHEET NOTE
Physical Therapy  Cancellation/No-show Note  Patient Name:  Negrito Nicole  :  1960   Date:  2024  Cancelled visits to date: 0  No-shows to date: 1    For today's appointment patient:  [x]  Cancelled  []  Rescheduled appointment  []  No-show     Reason given by patient:  []  Patient ill  []  Conflicting appointment  []  No transportation    []  Conflict with work  []  No reason given  []  Other:     Comments:      Electronically signed by:  Tammy Coronado PTA

## 2024-04-23 ENCOUNTER — OFFICE VISIT (OUTPATIENT)
Dept: ORTHOPEDIC SURGERY | Age: 64
End: 2024-04-23

## 2024-04-23 VITALS
BODY MASS INDEX: 37.1 KG/M2 | OXYGEN SATURATION: 97 % | WEIGHT: 265 LBS | HEIGHT: 71 IN | TEMPERATURE: 97.2 F | RESPIRATION RATE: 18 BRPM | HEART RATE: 74 BPM

## 2024-04-23 DIAGNOSIS — Z96.652 STATUS POST LEFT KNEE REPLACEMENT: Primary | ICD-10-CM

## 2024-04-23 NOTE — PATIENT INSTRUCTIONS
Continue weight-bearing as tolerated.  Continue range of motion exercises as instructed.  Ice and elevate as needed.  Tylenol or Motrin for pain.  Follow up as scheduled    If you have questions or concerns, please feel free to contact Maryana Bowers LPN, Ortho Navigator Nurse [phone].

## 2024-04-25 ENCOUNTER — HOSPITAL ENCOUNTER (OUTPATIENT)
Dept: PHYSICAL THERAPY | Age: 64
Setting detail: THERAPIES SERIES
Discharge: HOME | End: 2024-04-25
Payer: COMMERCIAL

## 2024-04-25 PROCEDURE — 97110 THERAPEUTIC EXERCISES: CPT

## 2024-04-25 PROCEDURE — 97530 THERAPEUTIC ACTIVITIES: CPT

## 2024-04-25 NOTE — FLOWSHEET NOTE
Outpatient Physical Therapy  [place]           [] Phone: [phone]   Fax: [phone]  [place]           [x] Phone: [phone]   Fax: [phone]        Physical Therapy Daily Treatment Note  Date:  2024    Patient Name:  Negrito Nicole    :  1960  MRN: [id number]  Restrictions/Precautions: fall risk  Diagnosis:   S/P total knee arthroplasty, left [Z96.652]    Date of Injury/Surgery: Pt had a L TKA 3/13/24. He reports he stayed 1 night at [facility] and then went home Thursday.    Treatment Diagnosis:   M62.81 muscle weakness, R26.89 abnormality of gait   Insurance/Certification information:  Medical [place]  Referring Physician:  Thor Gross MD     PCP: Roberto Gonzalez MD  Plan of care signed (Y/N):  summer co sign sent  Outcome Measure: LEFs:  = 11.25% ability = 88.75% disability    Visit# / total visits:   10/20 per year  Pain level: 5/10   Goals:     Patient goals:  \"cessation of pain and enough mobility to do normal activities.\"     Short term goals to be achieved by 2024:  Short term goal 1: Pt will report compliance with current HEP as prescribed in order to improve ROM and strength.   Short term goal 2: Pt will demonstrate AAROM L knee flexion to at least 120 degrees in order to improve ROM.  118*  Short term goal 3: Pt will demonstrate AROM L knee flexion to at least 110 degrees in order to improve ROM.114*  Short term goal 4: Pt will demonstrate L LE strength at least grossly 3/5 in order to improve strength.    4+/5  Short term goal 5: Pt will demonstrate  the ability to safely and I lift L LE onto and off plinth with no external A in order to improve strength.   MET  Short term goal 6: Pt will demonstrate the ability to safely ambulate 2 laps in the clinic with a SPC and supervision in order to improve functional mobility.   MET  Short term goal 7: Pt will demonstrate a LEFs score of no more than 76% disability in order to improve quality of life.

## 2024-04-27 NOTE — PROGRESS NOTES
4/23/2024   Chief Complaint   Patient presents with    Knee Pain     Left knee TKA        History of Present Illness:                             Negrito Nicole is a 63 y.o. male returns today for follow-up of his left total knee replacement.  He is making progress with therapy but still has some aching soreness and stiffness in his knee which is gradually improving.        Medical History  Patient's medications, allergies, past medical, surgical, social and family histories were reviewed and updated as appropriate.      Review of Systems                                            Examination:  General Exam:  Vitals: Pulse 74   Temp 97.2 °F (36.2 °C)   Resp 18   Ht 1.803 m (5' 11\")   Wt 120.2 kg (265 lb)   SpO2 97%   BMI 36.96 kg/m²    Physical Exam     Left Lower Extremity:    The incision is well-healed.  No erythema, drainage, or induration.  Minimal resolving soft tissue swelling present throughout the soft tissues of the knee.  Range of motion is present from full extension to 120 degrees of flexion.  Calf is soft and nontender.  Negative Homans sign.  Sensation and motor function is intact at the knee and ankle.      Diagnostic testing:  X-rays reviewed in office, I independently reviewed the films in the office today:   Previous x-rays show well aligned total knee replacement      Office Procedures:  Orders Placed This Encounter   Procedures    [facility] Physical Therapy [facility]     Referral Priority:   Routine     Referral Type:   Eval and Treat     Referral Reason:   Specialty Services Required     Requested Specialty:   Physical Therapist     Number of Visits Requested:   1       Assessment and Plan  Left total knee replacement    The patient is healing well and making steady improvements in the knee with physical therapy.    I have recommended continuing with rehabilitation as a home program once he has completed physical therapy.  An additional order for physical therapy was ordered today.

## 2024-04-29 ENCOUNTER — HOSPITAL ENCOUNTER (OUTPATIENT)
Dept: PHYSICAL THERAPY | Age: 64
Setting detail: THERAPIES SERIES
Discharge: HOME | End: 2024-04-29
Payer: COMMERCIAL

## 2024-04-29 PROCEDURE — 97530 THERAPEUTIC ACTIVITIES: CPT

## 2024-04-29 PROCEDURE — 97110 THERAPEUTIC EXERCISES: CPT

## 2024-04-29 NOTE — FLOWSHEET NOTE
Outpatient Physical Therapy  [place]           [] Phone: [phone]   Fax: [phone]  [place]           [x] Phone: [phone]   Fax: [phone]        Physical Therapy Daily Treatment Note  Date:  2024    Patient Name:  Negrito Nicole    :  1960  MRN: [id number]  Restrictions/Precautions: fall risk  Diagnosis:   S/P total knee arthroplasty, left [Z96.652]    Date of Injury/Surgery: Pt had a L TKA 3/13/24. He reports he stayed 1 night at [facility] and then went home Thursday.    Treatment Diagnosis:   M62.81 muscle weakness, R26.89 abnormality of gait   Insurance/Certification information:  Medical [place]  Referring Physician:  Thor Gross MD     PCP: Roberto Gonzalez MD  Plan of care signed (Y/N):  summer co sign sent  Outcome Measure: LEFs:  = 11.25% ability = 88.75% disability    Visit# / total visits:    per year  Pain level: 3/10   Goals:     Patient goals:  \"cessation of pain and enough mobility to do normal activities.\"     Short term goals to be achieved by 2024:  Short term goal 1: Pt will report compliance with current HEP as prescribed in order to improve ROM and strength.   Short term goal 2: Pt will demonstrate AAROM L knee flexion to at least 120 degrees in order to improve ROM.  118*  Short term goal 3: Pt will demonstrate AROM L knee flexion to at least 110 degrees in order to improve ROM.114*  Short term goal 4: Pt will demonstrate L LE strength at least grossly 3/5 in order to improve strength.    4+/5  Short term goal 5: Pt will demonstrate  the ability to safely and I lift L LE onto and off plinth with no external A in order to improve strength.   MET  Short term goal 6: Pt will demonstrate the ability to safely ambulate 2 laps in the clinic with a SPC and supervision in order to improve functional mobility.   MET  Short term goal 7: Pt will demonstrate a LEFs score of no more than 76% disability in order to improve quality of life.

## 2024-05-01 ENCOUNTER — HOSPITAL ENCOUNTER (OUTPATIENT)
Dept: PHYSICAL THERAPY | Age: 64
Setting detail: THERAPIES SERIES
Discharge: HOME | End: 2024-05-01
Payer: COMMERCIAL

## 2024-05-01 PROCEDURE — 97110 THERAPEUTIC EXERCISES: CPT

## 2024-05-01 PROCEDURE — 97530 THERAPEUTIC ACTIVITIES: CPT

## 2024-05-01 PROCEDURE — 97112 NEUROMUSCULAR REEDUCATION: CPT

## 2024-05-01 NOTE — FLOWSHEET NOTE
Outpatient Physical Therapy  [place]           [] Phone: [phone]   Fax: [phone]  [place]           [x] Phone: [phone]   Fax: [phone]        Physical Therapy Daily Treatment Note  Date:  2024    Patient Name:  Negrito Nicole    :  1960  MRN: [id number]  Restrictions/Precautions: fall risk  Diagnosis:   S/P total knee arthroplasty, left [Z96.652]    Date of Injury/Surgery: Pt had a L TKA 3/13/24. He reports he stayed 1 night at [facility] and then went home Thursday.    Treatment Diagnosis:   M62.81 muscle weakness, R26.89 abnormality of gait   Insurance/Certification information:  Medical [place]  Referring Physician:  Thor Gross MD     PCP: Roberto Gonzalez MD  Plan of care signed (Y/N):  summer co sign sent  Outcome Measure: LEFs:  = 11.25% ability = 88.75% disability    Visit# / total visits:    per year  Pain level: 5/10   Goals:     Patient goals:  \"cessation of pain and enough mobility to do normal activities.\"     Short term goals to be achieved by 2024:  Short term goal 1: Pt will report compliance with current HEP as prescribed in order to improve ROM and strength.   Short term goal 2: Pt will demonstrate AAROM L knee flexion to at least 120 degrees in order to improve ROM.  118*  Short term goal 3: Pt will demonstrate AROM L knee flexion to at least 110 degrees in order to improve ROM.114*  Short term goal 4: Pt will demonstrate L LE strength at least grossly 3/5 in order to improve strength.    4+/5  Short term goal 5: Pt will demonstrate  the ability to safely and I lift L LE onto and off plinth with no external A in order to improve strength.   MET  Short term goal 6: Pt will demonstrate the ability to safely ambulate 2 laps in the clinic with a SPC and supervision in order to improve functional mobility.   MET  Short term goal 7: Pt will demonstrate a LEFs score of no more than 76% disability in order to improve quality of life.

## 2024-05-06 ENCOUNTER — HOSPITAL ENCOUNTER (OUTPATIENT)
Dept: PHYSICAL THERAPY | Age: 64
Setting detail: THERAPIES SERIES
Discharge: HOME | End: 2024-05-06
Payer: COMMERCIAL

## 2024-05-06 PROCEDURE — 97110 THERAPEUTIC EXERCISES: CPT

## 2024-05-06 NOTE — FLOWSHEET NOTE
Outpatient Physical Therapy  [place]           [] Phone: [phone]   Fax: [phone]  [place]           [x] Phone: [phone]   Fax: [phone]        Physical Therapy Daily Treatment Note  Date:  2024    Patient Name:  Negrito Nicole    :  1960  MRN: [id number]  Restrictions/Precautions: fall risk  Diagnosis:   S/P total knee arthroplasty, left [Z96.652]    Date of Injury/Surgery: Pt had a L TKA 3/13/24. He reports he stayed 1 night at [facility] and then went home Thursday.    Treatment Diagnosis:   M62.81 muscle weakness, R26.89 abnormality of gait   Insurance/Certification information:  Medical [place]  Referring Physician:  Thor Gross MD     PCP: Roberto Gonzalez MD  Plan of care signed (Y/N):  summer co sign sent  Outcome Measure: LEFs:  = 11.25% ability = 88.75% disability    Visit# / total visits:    per year  Pain level: 4/10   Goals:     Patient goals:  \"cessation of pain and enough mobility to do normal activities.\"     Updated Goals to be achieved by May 30, 2024:  1) Pt will demonstrate I with current HEP as prescribed in order to increase ROM and strength.   2) Pt will demonstrate AROM L knee flexion to at least 120 degrees in order to improve ROM.   3) Pt will demonstrate the ability to safely and I ambulate 2 laps in the clinic with no AD in order to improve functional mobility.   4) Pt will demonstrate a LEFs score of no more than 35% disability in order to improve quality of life.     Subjective:   Patient reports 4/10 pain upon arrival and appears amb Indep. He reports he left his cane in [place] last night.      Any changes in Ambulatory Summary Sheet?  None    Objective:  AROM  LE  Knee:     Left   Knee flexion    AAROM   120 degrees, AROM: 116*   Knee extension       0 degrees      Exercises: (No more than 4 columns)   Exercise/Equipment 2024  (11) 24 Date: 24            WARM UP       NuStep    S10/A11  Lv4 5' S10/A11  Lv4 5'

## 2024-05-08 ENCOUNTER — HOSPITAL ENCOUNTER (OUTPATIENT)
Dept: PHYSICAL THERAPY | Age: 64
Setting detail: THERAPIES SERIES
Discharge: HOME | End: 2024-05-08
Payer: COMMERCIAL

## 2024-05-08 PROCEDURE — 97110 THERAPEUTIC EXERCISES: CPT

## 2024-05-08 NOTE — FLOWSHEET NOTE
Outpatient Physical Therapy  [place]           [] Phone: [phone]   Fax: [phone]  [place]           [x] Phone: [phone]   Fax: [phone]        Physical Therapy Daily Treatment Note  Date:  2024    Patient Name:  Negrito Nicole    :  1960  MRN: [id number]  Restrictions/Precautions: fall risk  Diagnosis:   S/P total knee arthroplasty, left [Z96.652]    Date of Injury/Surgery: Pt had a L TKA 3/13/24. He reports he stayed 1 night at [facility] and then went home Thursday.    Treatment Diagnosis:   M62.81 muscle weakness, R26.89 abnormality of gait   Insurance/Certification information:  Medical [place]  Referring Physician:  Thor Gross MD     PCP: Roberto Gonzalez MD  Plan of care signed (Y/N):  summer co sign sent  Outcome Measure: LEFs:  = 11.25% ability = 88.75% disability    Visit# / total visits:    per year  Pain level: 4/10   Goals:     Patient goals:  \"cessation of pain and enough mobility to do normal activities.\"     Updated Goals to be achieved by May 30, 2024:  1) Pt will demonstrate I with current HEP as prescribed in order to increase ROM and strength.   2) Pt will demonstrate AROM L knee flexion to at least 120 degrees in order to improve ROM.   3) Pt will demonstrate the ability to safely and I ambulate 2 laps in the clinic with no AD in order to improve functional mobility.   4) Pt will demonstrate a LEFs score of no more than 35% disability in order to improve quality of life.     Subjective:   Patient reports 4/10 pain upon arrival and appears amb Indep.   Any changes in Ambulatory Summary Sheet?  None    Objective:  AROM  LE  Knee:     Left   Knee flexion    AAROM   120 degrees, AROM: 116*   Knee extension       0 degrees      Exercises: (No more than 4 columns)   Exercise/Equipment Date: 24          WARM UP     NuStep    stop    Bike X10' S6 Old bike x10'   TABLE     Seated dangle with OP hold 2x1'    Heel slides 10x10\" sliding board and

## 2024-05-13 ENCOUNTER — HOSPITAL ENCOUNTER (OUTPATIENT)
Dept: PHYSICAL THERAPY | Age: 64
Setting detail: THERAPIES SERIES
Discharge: HOME | End: 2024-05-13
Payer: COMMERCIAL

## 2024-05-13 PROCEDURE — 97110 THERAPEUTIC EXERCISES: CPT

## 2024-05-13 NOTE — FLOWSHEET NOTE
Outpatient Physical Therapy  [place]           [] Phone: [phone]   Fax: [phone]  [place]           [x] Phone: [phone]   Fax: [phone]        Physical Therapy Daily Treatment Note  Date:  2024    Patient Name:  Negrito Nicole    :  1960  MRN: [id number]  Restrictions/Precautions: fall risk  Diagnosis:   S/P total knee arthroplasty, left [Z96.652]    Date of Injury/Surgery: Pt had a L TKA 3/13/24. He reports he stayed 1 night at [facility] and then went home Thursday.    Treatment Diagnosis:   M62.81 muscle weakness, R26.89 abnormality of gait   Insurance/Certification information:  Medical [place]  Referring Physician:  Thor Gross MD     PCP: Roberto Gonzalez MD  Plan of care signed (Y/N):  summer co sign sent  Outcome Measure: LEFs:  = 11.25% ability = 88.75% disability    Visit# / total visits:   15/20 per year  Pain level: 2/10   Goals:     Patient goals:  \"cessation of pain and enough mobility to do normal activities.\"     Updated Goals to be achieved by May 30, 2024:  1) Pt will demonstrate I with current HEP as prescribed in order to increase ROM and strength.   2) Pt will demonstrate AROM L knee flexion to at least 120 degrees in order to improve ROM.   3) Pt will demonstrate the ability to safely and I ambulate 2 laps in the clinic with no AD in order to improve functional mobility.   4) Pt will demonstrate a LEFs score of no more than 35% disability in order to improve quality of life.     Subjective:   Patient rates his knee pain 2/10 today.  Having increased stiffness from doing yard work today.    Any changes in Ambulatory Summary Sheet?  None    Objective:  AROM  LE  Knee:     Left   Knee flexion    AAROM   117 degrees, AROM: 116*   Knee extension       0 degrees      Exercises: (No more than 4 columns)   Exercise/Equipment Date: 24           WARM UP      NuStep    stop     Bike X10' S6 Old bike x10' Seat 6 x 10 min    TABLE      Seated

## 2024-05-16 ENCOUNTER — HOSPITAL ENCOUNTER (OUTPATIENT)
Dept: PHYSICAL THERAPY | Age: 64
Setting detail: THERAPIES SERIES
Discharge: HOME | End: 2024-05-16
Payer: COMMERCIAL

## 2024-05-16 PROCEDURE — 97110 THERAPEUTIC EXERCISES: CPT

## 2024-05-16 NOTE — FLOWSHEET NOTE
Outpatient Physical Therapy  [place]           [] Phone: [phone]   Fax: [phone]  [place]           [x] Phone: [phone]   Fax: [phone]        Physical Therapy Daily Treatment Note  Date:  2024    Patient Name:  Negrito Nicole    :  1960  MRN: [id number]  Restrictions/Precautions: fall risk  Diagnosis:   S/P total knee arthroplasty, left [Z96.652]    Date of Injury/Surgery: Pt had a L TKA 3/13/24. He reports he stayed 1 night at [facility] and then went home Thursday.    Treatment Diagnosis:   M62.81 muscle weakness, R26.89 abnormality of gait   Insurance/Certification information:  Medical [place]  Referring Physician:  Thor Gross MD     PCP: Roberto Gonzalez MD  Plan of care signed (Y/N):  summer co sign sent  Outcome Measure: LEFs:  = 11.25% ability = 88.75% disability    Visit# / total visits:    per year  Pain level: 2/10   Goals:     Patient goals:  \"cessation of pain and enough mobility to do normal activities.\"     Updated Goals to be achieved by May 30, 2024:  1) Pt will demonstrate I with current HEP as prescribed in order to increase ROM and strength.   2) Pt will demonstrate AROM L knee flexion to at least 120 degrees in order to improve ROM.   3) Pt will demonstrate the ability to safely and I ambulate 2 laps in the clinic with no AD in order to improve functional mobility.   4) Pt will demonstrate a LEFs score of no more than 35% disability in order to improve quality of life.     Subjective:   Patient rates his knee pain 2/10 today reports he will go on vacation next week and then return to PT the following week for recheck - was able to stand on ladder today and clean gutters  Any changes in Ambulatory Summary Sheet?  None    Objective:  AROM  LE  Knee:     Left   Knee flexion     AROM: 116*   Knee extension       0 degrees      Exercises: (No more than 4 columns)   Exercise/Equipment 24           WARM UP      NuStep         Bike

## 2024-06-11 ENCOUNTER — OFFICE VISIT (OUTPATIENT)
Dept: ORTHOPEDIC SURGERY | Age: 64
End: 2024-06-11

## 2024-06-11 ENCOUNTER — OFFICE VISIT (OUTPATIENT)
Age: 64
End: 2024-06-11
Payer: COMMERCIAL

## 2024-06-11 ENCOUNTER — HOSPITAL ENCOUNTER (OUTPATIENT)
Age: 64
Discharge: HOME | End: 2024-06-11
Payer: COMMERCIAL

## 2024-06-11 VITALS
BODY MASS INDEX: 37.1 KG/M2 | OXYGEN SATURATION: 96 % | SYSTOLIC BLOOD PRESSURE: 130 MMHG | WEIGHT: 265 LBS | HEIGHT: 71 IN | HEART RATE: 80 BPM | DIASTOLIC BLOOD PRESSURE: 76 MMHG

## 2024-06-11 VITALS — OXYGEN SATURATION: 97 % | TEMPERATURE: 97.8 F | HEART RATE: 71 BPM | RESPIRATION RATE: 17 BRPM

## 2024-06-11 DIAGNOSIS — R39.9 LOWER URINARY TRACT SYMPTOMS (LUTS): Primary | ICD-10-CM

## 2024-06-11 DIAGNOSIS — R97.20 ELEVATED PSA: ICD-10-CM

## 2024-06-11 DIAGNOSIS — R39.9 LOWER URINARY TRACT SYMPTOMS (LUTS): ICD-10-CM

## 2024-06-11 DIAGNOSIS — Z96.652 STATUS POST LEFT KNEE REPLACEMENT: Primary | ICD-10-CM

## 2024-06-11 LAB
BILIRUBIN, POC: NEGATIVE
BLOOD URINE, POC: ABNORMAL
CLARITY, POC: ABNORMAL
COLOR, POC: YELLOW
GLUCOSE URINE, POC: NEGATIVE
KETONES, POC: ABNORMAL
LEUKOCYTE EST, POC: NEGATIVE
NITRITE, POC: NEGATIVE
PH, POC: 6
PROSTATE SPECIFIC ANTIGEN: 8.49 NG/ML (ref 0–4)
PROTEIN, POC: NEGATIVE
SPECIFIC GRAVITY, POC: 1.02
UROBILINOGEN, POC: 0.2

## 2024-06-11 PROCEDURE — 99214 OFFICE O/P EST MOD 30 MIN: CPT | Performed by: PHYSICIAN ASSISTANT

## 2024-06-11 PROCEDURE — 81002 URINALYSIS NONAUTO W/O SCOPE: CPT | Performed by: PHYSICIAN ASSISTANT

## 2024-06-11 PROCEDURE — G0103 PSA SCREENING: HCPCS

## 2024-06-11 PROCEDURE — 99024 POSTOP FOLLOW-UP VISIT: CPT | Performed by: ORTHOPAEDIC SURGERY

## 2024-06-11 PROCEDURE — G8427 DOCREV CUR MEDS BY ELIG CLIN: HCPCS | Performed by: PHYSICIAN ASSISTANT

## 2024-06-11 PROCEDURE — 1036F TOBACCO NON-USER: CPT | Performed by: PHYSICIAN ASSISTANT

## 2024-06-11 PROCEDURE — 3017F COLORECTAL CA SCREEN DOC REV: CPT | Performed by: PHYSICIAN ASSISTANT

## 2024-06-11 PROCEDURE — G8417 CALC BMI ABV UP PARAM F/U: HCPCS | Performed by: PHYSICIAN ASSISTANT

## 2024-06-11 PROCEDURE — 36415 COLL VENOUS BLD VENIPUNCTURE: CPT

## 2024-06-11 RX ORDER — TAMSULOSIN HYDROCHLORIDE 0.4 MG/1
0.4 CAPSULE ORAL DAILY
Qty: 30 CAPSULE | Refills: 0 | Status: SHIPPED | OUTPATIENT
Start: 2024-06-11

## 2024-06-11 ASSESSMENT — ENCOUNTER SYMPTOMS
CONSTIPATION: 0
DIARRHEA: 0
SHORTNESS OF BREATH: 0
WHEEZING: 0
VOMITING: 0
COLOR CHANGE: 0
EYE PAIN: 0
ABDOMINAL PAIN: 0
BLOOD IN STOOL: 0
EYE REDNESS: 0
RHINORRHEA: 0
COUGH: 0
NAUSEA: 0
CHEST TIGHTNESS: 0
EYE DISCHARGE: 0
SORE THROAT: 0
PHOTOPHOBIA: 0
BACK PAIN: 0

## 2024-06-11 NOTE — PATIENT INSTRUCTIONS
Continue weight-bearing as tolerated.  Continue range of motion exercises as instructed.  Ice and elevate as needed.  Tylenol or Motrin for pain.  Follow up in 3 months

## 2024-06-11 NOTE — PROGRESS NOTES
Negrito Nicole (:  1960) is a 63 y.o. male,Established patient, here for evaluation of the following chief complaint(s):    Urinary Pain (Started 1 week or two ago. ) and Urinary Frequency      SUBJECTIVE/OBJECTIVE:  HPI  Negrito Nicole is a pleasant 63 y.o. male presenting to clinic today for urinary symptoms.  Patient reports he has had progressive urinary dribbling and weak stream and frequency for the past several months with onset of some mild dysuria and urinary urgency about a week ago; does report that the urinary urgency and dysuria seems to be improving slightly since onset.  Reports he uses the restroom to urinate about once every 2 hours during the day and is up about 2 times per night which has not changed.  Denies any flank pain or abdominal pain, denies nausea or vomiting or fever.  Denies known similar history.  Patient denies any scrotal pain or swelling, denies rectal pain or change in bowel habits, denies penile discharge.  Reports he is not sexually active.            No Known Allergies    Current Outpatient Medications   Medication Sig Dispense Refill    tamsulosin (FLOMAX) 0.4 MG capsule Take 1 capsule by mouth daily 30 capsule 0    fluticasone (FLONASE) 50 MCG/ACT nasal spray 2 sprays by Each Nostril route daily 16 g 0    CALCIUM PO Take by mouth      vitamin B-12 (CYANOCOBALAMIN) 1000 MCG tablet Take 1 tablet by mouth daily      Potassium 99 MG TABS Take by mouth 2 tabs in am      fish oil-omega-3 fatty acids 1000 MG capsule Take 2 capsules by mouth daily      aspirin 81 MG EC tablet Take 1 tablet by mouth daily (Patient not taking: Reported on 2024) 90 tablet 3    docusate sodium (COLACE) 100 MG capsule Take 1 capsule by mouth daily as needed for Constipation (Patient not taking: Reported on 2024) 30 capsule 0    aspirin (BENNY ASPIRIN) 325 MG tablet Take 1 tablet by mouth in the morning and at bedtime (Patient not taking: Reported on 2024) 30 tablet 0    pseudoephedrine

## 2024-06-11 NOTE — PROGRESS NOTES
6/11/2024   Chief Complaint   Patient presents with    Post-Op Check    Knee Pain     12 wk PO Left TKA, DOS 3/13/2024        History of Present Illness:                             Negrito Nicole is a 63 y.o. male who returns today for follow-up of his left total knee replacement.  He has completed his therapy.  He has been active and has been increasing his exercises.  He feels that his knee has good range of motion and strength but he still has some occasional tightness and discomfort especially with prolonged standing and weightbearing activities.        Medical History  Patient's medications, allergies, past medical, surgical, social and family histories were reviewed and updated as appropriate.      Review of Systems                                            Examination:  General Exam:  Vitals: Pulse 71   Temp 97.8 °F (36.6 °C)   Resp 17   SpO2 97%    Physical Exam     Left Lower Extremity:    The incision is well-healed.  No erythema, drainage, or induration.  Minimal resolving soft tissue swelling present throughout the soft tissues of the knee.  Range of motion is present from full extension to 120 degrees of flexion.  Calf is soft and nontender.  Negative Homans sign.  Sensation and motor function is intact at the knee and ankle.      Diagnostic testing:  X-rays reviewed in office, I independently reviewed the films in the office today:     Previous x-rays show well aligned total knee replacement    Office Procedures:  No orders of the defined types were placed in this encounter.      Assessment and Plan  1.  Left knee replacement     I reassured him that his knee is healing appropriately well and has good range of motion and strength on my exam today.    I expect that his comfort level will continue to improve over time through the healing process.  I have encouraged him to continue with stretching exercises and just scar tissue massage in addition to continuing his exercise program and weight

## 2024-06-11 NOTE — PROGRESS NOTES
Patient seen in office today for: 12 wk PO Left TKA, DOS 3/13/2024    Patient reports 2/10 pain at rest and increases with activity. Pain is consistent and dull.   RICE and medication are effective to alleviate pain and reduce swelling.     Pain worsened by: Patient reports painful ROM & weight bearing.     Patient ___________ physical therapy

## 2024-06-12 ENCOUNTER — TELEPHONE (OUTPATIENT)
Dept: FAMILY MEDICINE CLINIC | Age: 64
End: 2024-06-12

## 2024-06-12 DIAGNOSIS — R39.9 LOWER URINARY TRACT SYMPTOMS (LUTS): Primary | ICD-10-CM

## 2024-06-12 DIAGNOSIS — R97.20 ELEVATED PSA: ICD-10-CM

## 2024-06-12 NOTE — RESULT ENCOUNTER NOTE
Reviewed results with patient via telephone; urgent referral placed to urology; please fax and try to set up appointment for patient. May need biopsy etc

## 2024-06-12 NOTE — TELEPHONE ENCOUNTER
Pt called stating the  doesn't accept patients insurance and needs a referral placed to a different urologist.    Please advise.

## 2024-06-12 NOTE — TELEPHONE ENCOUNTER
Referred to Dr. Forbes on [address] here in [place]. Assoc with [facility].

## 2024-06-13 DIAGNOSIS — N39.0 COMPLICATED UTI (URINARY TRACT INFECTION): Primary | ICD-10-CM

## 2024-06-13 LAB
BACTERIA UR CULT: ABNORMAL
ORGANISM: ABNORMAL

## 2024-06-13 RX ORDER — CIPROFLOXACIN 500 MG/1
500 TABLET, FILM COATED ORAL 2 TIMES DAILY
Qty: 10 TABLET | Refills: 0 | Status: SHIPPED | OUTPATIENT
Start: 2024-06-13 | End: 2024-06-18

## 2024-06-13 NOTE — RESULT ENCOUNTER NOTE
Laverne:    Cielo Mr Nicole;    I see that you have already reviewed your urine culture result and your primary care provider sent you an antibiotic, sometimes UTIs can cause elevations in PSA values so the current infection might be the cause of the elevated PSA value.  Please let us know if your symptoms persist, worsen or change.  You can follow-up with urology as referred.    Please let me know if you have any other questions or concerns.  ThanksNegrito

## 2024-06-24 ENCOUNTER — OFFICE VISIT (OUTPATIENT)
Age: 64
End: 2024-06-24
Payer: COMMERCIAL

## 2024-06-24 VITALS
SYSTOLIC BLOOD PRESSURE: 138 MMHG | OXYGEN SATURATION: 93 % | HEART RATE: 70 BPM | WEIGHT: 270.8 LBS | HEIGHT: 71 IN | RESPIRATION RATE: 18 BRPM | DIASTOLIC BLOOD PRESSURE: 80 MMHG | BODY MASS INDEX: 37.91 KG/M2

## 2024-06-24 DIAGNOSIS — R97.20 ELEVATED PSA: ICD-10-CM

## 2024-06-24 DIAGNOSIS — E55.9 VITAMIN D DEFICIENCY: ICD-10-CM

## 2024-06-24 DIAGNOSIS — R73.01 IMPAIRED FASTING GLUCOSE: ICD-10-CM

## 2024-06-24 DIAGNOSIS — Z23 NEED FOR DIPHTHERIA-TETANUS-PERTUSSIS (TDAP) VACCINE: ICD-10-CM

## 2024-06-24 DIAGNOSIS — E03.8 SUBCLINICAL HYPOTHYROIDISM: ICD-10-CM

## 2024-06-24 DIAGNOSIS — E78.5 DYSLIPIDEMIA: ICD-10-CM

## 2024-06-24 DIAGNOSIS — R97.20 ELEVATED PSA: Primary | ICD-10-CM

## 2024-06-24 DIAGNOSIS — E78.5 DYSLIPIDEMIA: Primary | ICD-10-CM

## 2024-06-24 DIAGNOSIS — Z13.29 SCREENING FOR THYROID DISORDER: ICD-10-CM

## 2024-06-24 DIAGNOSIS — E66.9 OBESITY (BMI 30-39.9): ICD-10-CM

## 2024-06-24 PROCEDURE — 90471 IMMUNIZATION ADMIN: CPT | Performed by: GENERAL PRACTICE

## 2024-06-24 PROCEDURE — 90715 TDAP VACCINE 7 YRS/> IM: CPT | Performed by: GENERAL PRACTICE

## 2024-06-24 PROCEDURE — 99214 OFFICE O/P EST MOD 30 MIN: CPT | Performed by: GENERAL PRACTICE

## 2024-06-24 PROCEDURE — 3017F COLORECTAL CA SCREEN DOC REV: CPT | Performed by: GENERAL PRACTICE

## 2024-06-24 PROCEDURE — G8427 DOCREV CUR MEDS BY ELIG CLIN: HCPCS | Performed by: GENERAL PRACTICE

## 2024-06-24 PROCEDURE — G8417 CALC BMI ABV UP PARAM F/U: HCPCS | Performed by: GENERAL PRACTICE

## 2024-06-24 PROCEDURE — 1036F TOBACCO NON-USER: CPT | Performed by: GENERAL PRACTICE

## 2024-06-24 RX ORDER — FINASTERIDE 5 MG/1
5 TABLET, FILM COATED ORAL DAILY
Qty: 90 TABLET | Refills: 3 | Status: SHIPPED | OUTPATIENT
Start: 2024-06-24

## 2024-06-24 RX ORDER — TOPIRAMATE 50 MG/1
50 TABLET, FILM COATED ORAL
Qty: 90 TABLET | Refills: 3 | Status: SHIPPED | OUTPATIENT
Start: 2024-06-24

## 2024-06-24 SDOH — ECONOMIC STABILITY: FOOD INSECURITY: WITHIN THE PAST 12 MONTHS, YOU WORRIED THAT YOUR FOOD WOULD RUN OUT BEFORE YOU GOT MONEY TO BUY MORE.: NEVER TRUE

## 2024-06-24 SDOH — ECONOMIC STABILITY: TRANSPORTATION INSECURITY
IN THE PAST 12 MONTHS, HAS LACK OF TRANSPORTATION KEPT YOU FROM MEETINGS, WORK, OR FROM GETTING THINGS NEEDED FOR DAILY LIVING?: NO

## 2024-06-24 SDOH — ECONOMIC STABILITY: FOOD INSECURITY: WITHIN THE PAST 12 MONTHS, THE FOOD YOU BOUGHT JUST DIDN'T LAST AND YOU DIDN'T HAVE MONEY TO GET MORE.: NEVER TRUE

## 2024-06-24 ASSESSMENT — ENCOUNTER SYMPTOMS
SHORTNESS OF BREATH: 0
NAUSEA: 0
BACK PAIN: 0
BLOOD IN STOOL: 0
COUGH: 0
STRIDOR: 0
CHEST TIGHTNESS: 0
VOMITING: 0
ABDOMINAL PAIN: 0

## 2024-06-24 NOTE — PROGRESS NOTES
Negrito Nicoel (:  1960) is a 63 y.o. male,Established patient, here for evaluation of the following chief complaint(s):  3 Month Follow-Up       Assessment & Plan   ASSESSMENT/PLAN:  1. Dyslipidemia  Check TSH/FT4 and lipid panel.   - T4, Free  - Lipid Panel  - TSH    2. Vitamin D deficiency  Check Vit D  - Vitamin D 25 Hydroxy    3. Impaired fasting glucose  Check A1c  - Hemoglobin A1C    4. Elevated PSA  Recheck PSA-falsely elevated due to prostatic manipulation at last visit prior to blood draw. Pending appt with Dr. Forbes Urology. Denies LUTS.  - PSA, Prostatic Specific Antigen ([facility]); Future  - finasteride (PROSCAR) 5 MG tablet; Take 1 tablet by mouth daily  Dispense: 90 tablet; Refill: 3    5. Obesity (BMI 30-39.9)  Start topiramate nightly.  - topiramate (TOPAMAX) 50 MG tablet; Take 1 tablet by mouth nightly  Dispense: 90 tablet; Refill: 3      Return in about 4 months (around 10/24/2024) for Labs, Interval follow-up.         Subjective   SUBJECTIVE/OBJECTIVE:  HPI  Negrito Nicole is a 62 y.o. pleasant gentleman presenting today with a chief complaint of HL, NAFLD, STEVEN    He has a past medical history significant for:  HL (,  on 3/6/2023), on Omega-3   NAFLD  Chronic thrombocytopenia   Hemochromatosis with blood donation regularly  Cough-variant asthma, on Albuterol inh PRN   STEVEN s/p UPPP, on CPAP QHS  OA  Depression, off Celexa    Vitamin B12 deficiency, on PO Cyanocobalamin   Obesity (BMI 33)   S/p umbilical hernia repair (Dr. Romero)   Never smoker   Occasional alcohol use   Supplements:Chondroitin, Zinc, K gluconate     # Chronic L knee pain. Has seen Ortho s/p IA steroid injection 2022. Minimal relief.  XR without significant OA.  Pending arthroscopy with Dr. Gross 3/15/2024.  # Sees Dr. Hernandez for STEVEN. Uses a CPAP.   # Colonoscopy in  with 2 polyps. Was recommended repeat in 5 years  but did not follow through. Had FIT test in .   # Seeing GI for elevated ALT,

## 2024-06-24 NOTE — PROGRESS NOTES
Patient would like to discuss prostate issues.      He previously had UTI.      Patient wanted to talk about weight loss medications.

## 2024-06-25 ENCOUNTER — TELEPHONE (OUTPATIENT)
Age: 64
End: 2024-06-25

## 2024-06-25 NOTE — TELEPHONE ENCOUNTER
Patient contacted office requesting his urology referral be sent to Dr. Henri Forbes in [place].

## 2024-06-25 NOTE — TELEPHONE ENCOUNTER
----- Message from Shikhamesfinnhan Thakur Lane sent at 6/24/2024  2:48 PM EDT -----  Regarding: ECC Referral Request  ECC Referral Request    Reason for referral request: Specialty Provider    Specialist/Lab/Test patient is requesting (if known): N/A    Specialist Phone Number (if applicable): N/A    Additional Information Patient needs a referral from Roberto Paul MD TO DR. Henri Forbes, neurologist.  --------------------------------------------------------------------------------------------------------------------------    Relationship to Patient: Self     Call Back Information: OK to leave message on voicemail  Preferred Call Back Number: Phone [phone]

## 2024-06-27 ENCOUNTER — NURSE ONLY (OUTPATIENT)
Age: 64
End: 2024-06-27
Payer: COMMERCIAL

## 2024-06-27 DIAGNOSIS — Z13.29 SCREENING FOR THYROID DISORDER: ICD-10-CM

## 2024-06-27 DIAGNOSIS — R97.20 ELEVATED PSA: ICD-10-CM

## 2024-06-27 DIAGNOSIS — E78.5 DYSLIPIDEMIA: ICD-10-CM

## 2024-06-27 DIAGNOSIS — E55.9 VITAMIN D DEFICIENCY: ICD-10-CM

## 2024-06-27 LAB
25(OH)D3 SERPL-MCNC: 21.6 NG/ML
CHOLEST SERPL-MCNC: 161 MG/DL (ref 0–199)
HDLC SERPL-MCNC: 43 MG/DL (ref 40–60)
LDLC SERPL CALC-MCNC: 89 MG/DL
PSA SERPL DL<=0.01 NG/ML-MCNC: 5.34 NG/ML (ref 0–4)
T4 FREE SERPL-MCNC: 1.1 NG/DL (ref 0.9–1.8)
TRIGL SERPL-MCNC: 147 MG/DL (ref 0–150)
TSH SERPL DL<=0.005 MIU/L-ACNC: 4.21 UIU/ML (ref 0.27–4.2)
VLDLC SERPL CALC-MCNC: 29 MG/DL

## 2024-06-27 PROCEDURE — 36415 COLL VENOUS BLD VENIPUNCTURE: CPT | Performed by: GENERAL PRACTICE

## 2024-06-28 LAB
EST. AVERAGE GLUCOSE BLD GHB EST-MCNC: 91.1 MG/DL
HBA1C MFR BLD: 4.8 %

## 2024-06-28 RX ORDER — ERGOCALCIFEROL 1.25 MG/1
50000 CAPSULE ORAL WEEKLY
Qty: 12 CAPSULE | Refills: 3 | Status: SHIPPED | OUTPATIENT
Start: 2024-06-28

## 2024-07-05 DIAGNOSIS — R39.9 LOWER URINARY TRACT SYMPTOMS (LUTS): ICD-10-CM

## 2024-07-07 RX ORDER — TAMSULOSIN HYDROCHLORIDE 0.4 MG/1
0.4 CAPSULE ORAL DAILY
Qty: 90 CAPSULE | Refills: 3 | Status: SHIPPED | OUTPATIENT
Start: 2024-07-07

## 2024-09-19 ENCOUNTER — OFFICE VISIT (OUTPATIENT)
Age: 64
End: 2024-09-19

## 2024-09-19 VITALS
TEMPERATURE: 98.2 F | HEART RATE: 82 BPM | WEIGHT: 273 LBS | DIASTOLIC BLOOD PRESSURE: 72 MMHG | OXYGEN SATURATION: 94 % | SYSTOLIC BLOOD PRESSURE: 126 MMHG | BODY MASS INDEX: 38.08 KG/M2

## 2024-09-19 DIAGNOSIS — J45.991 COUGH VARIANT ASTHMA: Primary | ICD-10-CM

## 2024-09-19 RX ORDER — ALBUTEROL SULFATE 90 UG/1
2 INHALANT RESPIRATORY (INHALATION) EVERY 4 HOURS PRN
Qty: 18 G | Refills: 0 | Status: SHIPPED | OUTPATIENT
Start: 2024-09-19

## 2024-09-19 RX ORDER — AZITHROMYCIN 250 MG/1
TABLET, FILM COATED ORAL
Qty: 6 TABLET | Refills: 0 | Status: SHIPPED | OUTPATIENT
Start: 2024-09-19 | End: 2024-09-29

## 2024-09-19 RX ORDER — IPRATROPIUM BROMIDE AND ALBUTEROL SULFATE 2.5; .5 MG/3ML; MG/3ML
1 SOLUTION RESPIRATORY (INHALATION) ONCE
Status: COMPLETED | OUTPATIENT
Start: 2024-09-19 | End: 2024-09-19

## 2024-09-19 RX ORDER — METHYLPREDNISOLONE ACETATE 40 MG/ML
40 INJECTION, SUSPENSION INTRA-ARTICULAR; INTRALESIONAL; INTRAMUSCULAR; SOFT TISSUE ONCE
Status: DISCONTINUED | OUTPATIENT
Start: 2024-09-19 | End: 2024-09-19

## 2024-09-19 RX ORDER — METHYLPREDNISOLONE SODIUM SUCCINATE 40 MG/ML
40 INJECTION, POWDER, LYOPHILIZED, FOR SOLUTION INTRAMUSCULAR; INTRAVENOUS ONCE
Status: COMPLETED | OUTPATIENT
Start: 2024-09-19 | End: 2024-09-19

## 2024-09-19 RX ORDER — BENZONATATE 200 MG/1
200 CAPSULE ORAL 3 TIMES DAILY PRN
Qty: 30 CAPSULE | Refills: 0 | Status: SHIPPED | OUTPATIENT
Start: 2024-09-19 | End: 2024-09-26

## 2024-09-19 RX ORDER — METHYLPREDNISOLONE 4 MG
TABLET, DOSE PACK ORAL
Qty: 1 KIT | Refills: 0 | Status: SHIPPED | OUTPATIENT
Start: 2024-09-19 | End: 2024-09-25

## 2024-09-19 RX ADMIN — IPRATROPIUM BROMIDE AND ALBUTEROL SULFATE 1 DOSE: 2.5; .5 SOLUTION RESPIRATORY (INHALATION) at 13:40

## 2024-09-19 RX ADMIN — METHYLPREDNISOLONE SODIUM SUCCINATE 40 MG: 40 INJECTION, POWDER, LYOPHILIZED, FOR SOLUTION INTRAMUSCULAR; INTRAVENOUS at 13:58

## 2024-09-19 ASSESSMENT — ENCOUNTER SYMPTOMS
NAUSEA: 0
ABDOMINAL PAIN: 0
CHEST TIGHTNESS: 1
DIARRHEA: 0
WHEEZING: 0
BACK PAIN: 0
SORE THROAT: 0
PHOTOPHOBIA: 0
EYE REDNESS: 0
VOMITING: 0
EYE DISCHARGE: 0
SHORTNESS OF BREATH: 0
COUGH: 1
BLOOD IN STOOL: 0
RHINORRHEA: 0
CONSTIPATION: 0
COLOR CHANGE: 0
EYE PAIN: 0

## 2024-10-11 ENCOUNTER — OFFICE VISIT (OUTPATIENT)
Age: 64
End: 2024-10-11
Payer: COMMERCIAL

## 2024-10-11 VITALS
HEIGHT: 71 IN | SYSTOLIC BLOOD PRESSURE: 136 MMHG | OXYGEN SATURATION: 96 % | TEMPERATURE: 98.4 F | RESPIRATION RATE: 18 BRPM | DIASTOLIC BLOOD PRESSURE: 86 MMHG | BODY MASS INDEX: 38.78 KG/M2 | WEIGHT: 277 LBS | HEART RATE: 86 BPM

## 2024-10-11 DIAGNOSIS — J45.991 COUGH VARIANT ASTHMA: Primary | ICD-10-CM

## 2024-10-11 PROCEDURE — G8482 FLU IMMUNIZE ORDER/ADMIN: HCPCS | Performed by: GENERAL PRACTICE

## 2024-10-11 PROCEDURE — G8417 CALC BMI ABV UP PARAM F/U: HCPCS | Performed by: GENERAL PRACTICE

## 2024-10-11 PROCEDURE — 99213 OFFICE O/P EST LOW 20 MIN: CPT | Performed by: GENERAL PRACTICE

## 2024-10-11 PROCEDURE — 1036F TOBACCO NON-USER: CPT | Performed by: GENERAL PRACTICE

## 2024-10-11 PROCEDURE — 3017F COLORECTAL CA SCREEN DOC REV: CPT | Performed by: GENERAL PRACTICE

## 2024-10-11 PROCEDURE — G8427 DOCREV CUR MEDS BY ELIG CLIN: HCPCS | Performed by: GENERAL PRACTICE

## 2024-10-11 RX ORDER — IPRATROPIUM BROMIDE AND ALBUTEROL SULFATE 2.5; .5 MG/3ML; MG/3ML
1 SOLUTION RESPIRATORY (INHALATION) ONCE
Status: SHIPPED | OUTPATIENT
Start: 2024-10-11

## 2024-10-11 RX ORDER — NEBULIZER ACCESSORIES
1 KIT MISCELLANEOUS DAILY PRN
Qty: 1 KIT | Refills: 0 | Status: SHIPPED | OUTPATIENT
Start: 2024-10-11

## 2024-10-11 RX ORDER — IPRATROPIUM BROMIDE AND ALBUTEROL SULFATE 2.5; .5 MG/3ML; MG/3ML
1 SOLUTION RESPIRATORY (INHALATION) EVERY 4 HOURS
Qty: 360 ML | Refills: 1 | Status: SHIPPED | OUTPATIENT
Start: 2024-10-11

## 2024-10-11 RX ORDER — PREDNISONE 50 MG/1
50 TABLET ORAL DAILY
Qty: 5 TABLET | Refills: 0 | Status: SHIPPED | OUTPATIENT
Start: 2024-10-11 | End: 2024-10-16

## 2024-10-11 NOTE — PROGRESS NOTES
Patient still has cough and sore throat since seeing the walk in clinic on 9/19.    
patient (or guardian, if applicable) and other individuals in attendance with the patient were advised that Artificial Intelligence will be utilized during this visit to record, process the conversation to generate a clinical note and to support improvement of the AI technology. The patient (or guardian, if applicable) and other individuals in attendance at the appointment consented to the use of AI, including the recording.      An electronic signature was used to authenticate this note.    --Roberto Gonzalez MD

## 2024-10-18 ENCOUNTER — OFFICE VISIT (OUTPATIENT)
Age: 64
End: 2024-10-18

## 2024-10-18 VITALS
RESPIRATION RATE: 18 BRPM | BODY MASS INDEX: 37.66 KG/M2 | SYSTOLIC BLOOD PRESSURE: 130 MMHG | WEIGHT: 269 LBS | HEIGHT: 71 IN | OXYGEN SATURATION: 95 % | DIASTOLIC BLOOD PRESSURE: 94 MMHG | HEART RATE: 85 BPM

## 2024-10-18 DIAGNOSIS — J45.991 COUGH VARIANT ASTHMA: Primary | ICD-10-CM

## 2024-10-18 DIAGNOSIS — R05.3 CHRONIC COUGH: ICD-10-CM

## 2024-10-18 RX ORDER — PREDNISONE 50 MG/1
50 TABLET ORAL DAILY
Qty: 5 TABLET | Refills: 0 | Status: SHIPPED | OUTPATIENT
Start: 2024-10-18 | End: 2024-10-23

## 2024-10-18 RX ORDER — BENZONATATE 200 MG/1
200 CAPSULE ORAL 3 TIMES DAILY PRN
COMMUNITY
End: 2024-10-18 | Stop reason: ALTCHOICE

## 2024-10-18 RX ORDER — FLUTICASONE PROPIONATE AND SALMETEROL 250; 50 UG/1; UG/1
1 POWDER RESPIRATORY (INHALATION) EVERY 12 HOURS
Qty: 60 EACH | Refills: 1 | Status: SHIPPED | OUTPATIENT
Start: 2024-10-18

## 2024-10-18 RX ORDER — PANTOPRAZOLE SODIUM 20 MG/1
20 TABLET, DELAYED RELEASE ORAL
Qty: 30 TABLET | Refills: 0 | Status: SHIPPED | OUTPATIENT
Start: 2024-10-18

## 2024-10-18 RX ORDER — TRIAMCINOLONE ACETONIDE 40 MG/ML
40 INJECTION, SUSPENSION INTRA-ARTICULAR; INTRAMUSCULAR ONCE
Status: COMPLETED | OUTPATIENT
Start: 2024-10-18 | End: 2024-10-18

## 2024-10-18 RX ADMIN — TRIAMCINOLONE ACETONIDE 40 MG: 40 INJECTION, SUSPENSION INTRA-ARTICULAR; INTRAMUSCULAR at 14:05

## 2024-10-18 NOTE — PROGRESS NOTES
Negrito Nicole (:  1960) is a 64 y.o. male, Established patient, here for evaluation of the following chief complaint(s):  Cough         Assessment & Plan  1. Chronic cough.  Acid reflux, a common cause of chronic cough outside the lungs, is suspected. Upper airway cough syndrome, such as rhinorrhea, is also a possibility. A 30-day course of Protonix has been prescribed, to be taken in the morning on an empty stomach an hour before meals, coffee, or water. Continuation of Flonase has been advised. If there is no improvement with Protonix for 3 to 4 weeks, a referral to a pulmonologist will be considered.  2. Cough variant asthma  Advair, 1 puff twice daily, has been prescribed. Cont duonebs, albuterol. Given kenalog 40mg, start prednisone.    1. Cough variant asthma  - fluticasone-salmeterol (ADVAIR) 250-50 MCG/ACT AEPB diskus inhaler; Inhale 1 puff into the lungs in the morning and 1 puff in the evening.  Dispense: 60 each; Refill: 1  - predniSONE (DELTASONE) 50 MG tablet; Take 1 tablet by mouth daily for 5 days  Dispense: 5 tablet; Refill: 0  - triamcinolone acetonide (KENALOG-40) injection 40 mg    2. Chronic cough  - pantoprazole (PROTONIX) 20 MG tablet; Take 1 tablet by mouth every morning (before breakfast)  Dispense: 30 tablet; Refill: 0    Return in about 1 week (around 10/25/2024) for Interval follow-up.       Subjective   Nergito Nicole is a 62 y.o. pleasant gentleman presenting today with a chief complaint of HL, NAFLD, STEVEN     He has a past medical history significant for:  HL (LDL 89  on 2024), on [place]-3   NAFLD  Chronic thrombocytopenia   Hemochromatosis with blood donation regularly  Cough-variant asthma, on Albuterol inh PRN   STEVEN s/p UPPP, on CPAP QHS  OA  Vitamin D deficiency  Depression, off Celexa    Vitamin B12 deficiency, on PO Cyanocobalamin   Obesity (BMI 33)   S/p umbilical hernia repair (Dr. Romero)   Never smoker   Occasional alcohol use   Supplements:Chondroitin, Zinc, K

## 2024-10-22 ENCOUNTER — TELEPHONE (OUTPATIENT)
Age: 64
End: 2024-10-22

## 2024-10-22 NOTE — TELEPHONE ENCOUNTER
----- Message from Christelle CEE sent at 10/22/2024  2:56 PM EDT -----  Regarding: ECC Referral Request  ECC Referral Request    Reason for referral request: Specialty Provider    Specialist/Lab/Test patient is requesting (if known): pulmonologist     Specialist Phone Number (if applicable): [phone]     Additional Information Patient would like to have a referral to be seen by a pulmonologist from [facility], and also to have an x-ray .    Roberto Gonzalez MD  PCP - General    --------------------------------------------------------------------------------------------------------------------------    Relationship to Patient: Spouse/Partner wife     Call Back Information: Do not leave any message, patient will call back for answer/ voicemail  Preferred Call Back Number: Phone [phone]

## 2024-10-25 DIAGNOSIS — R05.2 SUBACUTE COUGH: Primary | ICD-10-CM

## 2024-10-25 NOTE — TELEPHONE ENCOUNTER
Pt wife called stating there is no improvement in the cough slightly worsening. The coughing is still severe and almost had to call the squad because of shortness of breath from not being able to catch his breath. Pt wife states he always sounds SOB and always seems to breathe heavy. Pt has a pulse ox and it is usually around 93-95 at random (not during coughing fit)    Please advise

## 2024-11-02 ENCOUNTER — HOSPITAL ENCOUNTER (EMERGENCY)
Age: 64
Discharge: HOME | End: 2024-11-02
Attending: EMERGENCY MEDICINE
Payer: COMMERCIAL

## 2024-11-02 VITALS
BODY MASS INDEX: 37.1 KG/M2 | SYSTOLIC BLOOD PRESSURE: 131 MMHG | TEMPERATURE: 97.8 F | WEIGHT: 265 LBS | RESPIRATION RATE: 18 BRPM | OXYGEN SATURATION: 94 % | DIASTOLIC BLOOD PRESSURE: 99 MMHG | HEART RATE: 76 BPM | HEIGHT: 71 IN

## 2024-11-02 DIAGNOSIS — R03.0 ELEVATED BLOOD PRESSURE READING: Primary | ICD-10-CM

## 2024-11-02 PROCEDURE — 99282 EMERGENCY DEPT VISIT SF MDM: CPT

## 2024-11-02 ASSESSMENT — LIFESTYLE VARIABLES
HOW MANY STANDARD DRINKS CONTAINING ALCOHOL DO YOU HAVE ON A TYPICAL DAY: PATIENT DOES NOT DRINK
HOW OFTEN DO YOU HAVE A DRINK CONTAINING ALCOHOL: NEVER

## 2024-11-02 ASSESSMENT — PAIN - FUNCTIONAL ASSESSMENT: PAIN_FUNCTIONAL_ASSESSMENT: NONE - DENIES PAIN

## 2024-11-02 NOTE — DISCHARGE INSTRUCTIONS
Please continue to check your blood pressure once in the morning before you had a coffee or tea and keep a record of it; if your blood pressure is persistently elevated please consult with your primary care physician for further recommendation  Return to ER as needed

## 2024-11-02 NOTE — ED TRIAGE NOTES
Patient presented to ED with complaints of his blood pressure being elevated the last week or two. States started after being on medication a cold.

## 2024-11-02 NOTE — ED PROVIDER NOTES
Emergency Department Encounter    Patient: Negrito Nicole  MRN: [id number]  : 1960  Date of Evaluation: 2024  ED Provider:  Anish Arthur MD    Triage Chief Complaint:   Hypertension    [place]:  Negrito Nicole is a 64 y.o. male history of asthma, chronic shoulder pain, depression, hypertension as well as obstructive sleep apnea and obesity that presents emergency department concerned about elevated blood pressure reading.  He states this morning his systolic blood pressure was 194.  He is a he has a history of hypertension but has not been on medication for a long time because he thought his blood pressure has self corrected.  He does not admit to lightheadedness/dizziness, chest or abdominal pains, fatigue, shortness of breath or decreased urination.  Patient states he has not been getting good restful sleep lately and also states he had a chronic cough for which he was placed at corticosteroids for many weeks which she recently quit about a week ago.  He is not certain but he does not believe he was tapered off of the corticosteroids.  Patient does not admit to weight gain    ROS - see HPI, below listed is current ROS at time of my eval:  General:  No fevers, no chills, no weakness  Eyes:  No recent vison changes, no discharge  ENT:  No sore throat, no nasal congestion, no hearing changes  Cardiovascular: Elevated blood pressure reading  Respiratory:  No shortness of breath, no cough, no wheezing  Gastrointestinal:  No pain, no nausea, no vomiting, no diarrhea  Musculoskeletal:  No muscle pain, no joint pain  Skin:  No rash, no pruritis, no easy bruising  Neurologic:  No speech problems, no headache, no extremity numbness, no extremity tingling, no extremity weakness  Psychiatric:  No anxiety  Extremities:  no edema, no pain    Past Medical History:   Diagnosis Date    Asthma     Bruised ribs     running fall    Chronic shoulder pain     Contusion of ribs     \"dislocated or bruised ribs center of rt  had a long discussion with the patient and his wife about blood pressure in general and the signs and symptoms of a hypertensive emergency.  He is advised to continue checking his blood pressure in the morning once a day and if preponderance of the blood pressure reading shows persistent elevation, to talk to his primary care physician for further care recommendations.  Patient has clear questions which were answered to his satisfaction.    Clinical Impression:  1. Elevated blood pressure reading      Disposition referral (if applicable):  No follow-up provider specified.  Disposition medications (if applicable):  New Prescriptions    No medications on file     ED Provider Disposition Time  DISPOSITION Decision To Discharge 11/02/2024 02:56:47 PM           Comment: Please note this report has been produced using speech recognition software and may contain errors related to that system including errors in grammar, punctuation, and spelling, as well as words and phrases that may be inappropriate.  Efforts were made to edit the dictations.       Anish Arthur MD  11/02/24 [id number]

## 2024-11-07 ENCOUNTER — TELEPHONE (OUTPATIENT)
Age: 64
End: 2024-11-07

## 2024-11-07 DIAGNOSIS — R05.3 CHRONIC COUGH: Primary | ICD-10-CM

## 2024-11-07 NOTE — TELEPHONE ENCOUNTER
Dr. Monique's office called stating the pt sees Dr. Hernandez for sleep but that the provider also does pulmonology so the referral needs sent to them     Please advise

## 2024-11-15 ENCOUNTER — HOSPITAL ENCOUNTER (OUTPATIENT)
Age: 64
Discharge: HOME | End: 2024-11-15
Payer: COMMERCIAL

## 2024-11-15 ENCOUNTER — OFFICE VISIT (OUTPATIENT)
Dept: PULMONOLOGY | Age: 64
End: 2024-11-15
Payer: COMMERCIAL

## 2024-11-15 VITALS
OXYGEN SATURATION: 93 % | WEIGHT: 267.8 LBS | HEART RATE: 86 BPM | SYSTOLIC BLOOD PRESSURE: 132 MMHG | HEIGHT: 71 IN | DIASTOLIC BLOOD PRESSURE: 88 MMHG | BODY MASS INDEX: 37.49 KG/M2

## 2024-11-15 DIAGNOSIS — G47.10 HYPERSOMNIA: ICD-10-CM

## 2024-11-15 DIAGNOSIS — G47.33 OSA (OBSTRUCTIVE SLEEP APNEA): Primary | ICD-10-CM

## 2024-11-15 DIAGNOSIS — R05.3 CHRONIC COUGH: ICD-10-CM

## 2024-11-15 DIAGNOSIS — J45.40 MODERATE PERSISTENT REACTIVE AIRWAY DISEASE WITHOUT COMPLICATION: ICD-10-CM

## 2024-11-15 DIAGNOSIS — E66.9 OBESITY (BMI 30-39.9): ICD-10-CM

## 2024-11-15 LAB
BASOPHILS # BLD: 0.03 K/UL
BASOPHILS NFR BLD: 1 % (ref 0–1)
EOSINOPHIL # BLD: 0.19 K/UL
EOSINOPHILS RELATIVE PERCENT: 3 % (ref 0–3)
ERYTHROCYTE [DISTWIDTH] IN BLOOD BY AUTOMATED COUNT: 13.5 % (ref 11.7–14.9)
HCT VFR BLD AUTO: 41.9 % (ref 42–52)
HGB BLD-MCNC: 14.5 G/DL (ref 13.5–18)
IMM GRANULOCYTES # BLD AUTO: 0.03 K/UL
IMM GRANULOCYTES NFR BLD: 1 %
LYMPHOCYTES NFR BLD: 1.72 K/UL
LYMPHOCYTES RELATIVE PERCENT: 27 % (ref 24–44)
MCH RBC QN AUTO: 32.2 PG (ref 27–31)
MCHC RBC AUTO-ENTMCNC: 34.6 G/DL (ref 32–36)
MCV RBC AUTO: 92.9 FL (ref 78–100)
MONOCYTES NFR BLD: 0.48 K/UL
MONOCYTES NFR BLD: 8 % (ref 0–4)
NEUTROPHILS NFR BLD: 62 % (ref 36–66)
NEUTS SEG NFR BLD: 3.95 K/UL
PLATELET # BLD AUTO: 133 K/UL (ref 140–440)
PLATELET CONFIRMATION: NORMAL
PMV BLD AUTO: 9.6 FL (ref 7.5–11.1)
RBC # BLD AUTO: 4.51 M/UL (ref 4.6–6.2)
WBC OTHER # BLD: 6.4 K/UL (ref 4–10.5)

## 2024-11-15 PROCEDURE — 99215 OFFICE O/P EST HI 40 MIN: CPT | Performed by: INTERNAL MEDICINE

## 2024-11-15 PROCEDURE — 1036F TOBACCO NON-USER: CPT | Performed by: INTERNAL MEDICINE

## 2024-11-15 PROCEDURE — 85025 COMPLETE CBC W/AUTO DIFF WBC: CPT

## 2024-11-15 PROCEDURE — 36415 COLL VENOUS BLD VENIPUNCTURE: CPT

## 2024-11-15 PROCEDURE — 82785 ASSAY OF IGE: CPT

## 2024-11-15 PROCEDURE — G8417 CALC BMI ABV UP PARAM F/U: HCPCS | Performed by: INTERNAL MEDICINE

## 2024-11-15 PROCEDURE — G8428 CUR MEDS NOT DOCUMENT: HCPCS | Performed by: INTERNAL MEDICINE

## 2024-11-15 PROCEDURE — 3017F COLORECTAL CA SCREEN DOC REV: CPT | Performed by: INTERNAL MEDICINE

## 2024-11-15 PROCEDURE — G8482 FLU IMMUNIZE ORDER/ADMIN: HCPCS | Performed by: INTERNAL MEDICINE

## 2024-11-15 RX ORDER — LEVOFLOXACIN 500 MG/1
500 TABLET, FILM COATED ORAL DAILY
COMMUNITY
Start: 2024-11-14 | End: 2024-11-17

## 2024-11-15 ASSESSMENT — ENCOUNTER SYMPTOMS
SHORTNESS OF BREATH: 1
EYE DISCHARGE: 0
ABDOMINAL DISTENTION: 0
ABDOMINAL PAIN: 0
EYE ITCHING: 0
COUGH: 1
BACK PAIN: 0

## 2024-11-15 NOTE — PROGRESS NOTES
Negrito Nicole  1960  Referring Provider: Roberto Baumann, [facility] - General     Subjective:     Chief Complaint   Patient presents with    Follow-up     1yr f/u, no usage, lingering cough, its been going on for a couple months        HPI  Negrito is a 64 y.o. male has come back as a follow up. He has severe STEVEN on a Auto CPAP which he has not used it for couple of months as he had cough. He was treated with abx. He still has cough. He has no loss of weight. He has a nasal pillows mask. He is sleepy and tired during the day time.     He has cough for more than 2 months, occasional phlegm, no hemoptysis, no loss of weight, good appetite, SOBOE walking 1/2 mile. He is not on oxygen. He is not on any inhalers. He is not using his inhalers. He has h/o asthma about 30 yrs ago, no GERD, no PND now, no ACE-I. He has no h/o cancers. He had a needle biopsy of Prostate yesterday. He has 2 cats  x 7 yrs and 2  bearded dragons ( lizard) x 15 yrs, no birds, no hot tub, no molds.    Current Outpatient Medications   Medication Sig Dispense Refill    levoFLOXacin (LEVAQUIN) 500 MG tablet Take 1 tablet by mouth daily      pantoprazole (PROTONIX) 20 MG tablet Take 1 tablet by mouth every morning (before breakfast) 30 tablet 0    fluticasone-salmeterol (ADVAIR) 250-50 MCG/ACT AEPB diskus inhaler Inhale 1 puff into the lungs in the morning and 1 puff in the evening. 60 each 1    Nebulizers (COMPRESSOR/NEBULIZER) MISC 1 each by Does not apply route in the morning, at noon, in the evening, and at bedtime 1 each 3    Respiratory Therapy Supplies (NEBULIZER/TUBING/MOUTHPIECE) KIT 1 kit by Does not apply route daily as needed (use 4 times daily for cough) 1 kit 0    ipratropium 0.5 mg-albuterol 2.5 mg (DUONEB) 0.5-2.5 (3) MG/3ML SOLN nebulizer solution Inhale 3 mLs into the lungs every 4 hours 360 mL 1    albuterol sulfate HFA (VENTOLIN HFA) 108 (90 Base) MCG/ACT inhaler Inhale 2 puffs into the lungs every 4 hours as needed for Wheezing or

## 2024-11-15 NOTE — ASSESSMENT & PLAN NOTE
It appears to be sec to the reactive airway disease  He is off Inhalers  I'll do a    PFT  6 MWT  CT chest   Advised inhalers  No PND  CBC with diff  IgE

## 2024-11-17 LAB — IGE SERPL-ACNC: 97 KU/L

## 2024-11-21 ENCOUNTER — HOSPITAL ENCOUNTER (OUTPATIENT)
Dept: CT IMAGING | Age: 64
Discharge: HOME | End: 2024-11-21
Attending: INTERNAL MEDICINE
Payer: COMMERCIAL

## 2024-11-21 DIAGNOSIS — R05.3 CHRONIC COUGH: ICD-10-CM

## 2024-11-21 DIAGNOSIS — J45.991 COUGH VARIANT ASTHMA: ICD-10-CM

## 2024-11-21 LAB
EGFR, POC: >90 ML/MIN/1.73M2
POC CREATININE: 0.9 MG/DL (ref 0.5–1.2)

## 2024-11-21 PROCEDURE — 82565 ASSAY OF CREATININE: CPT

## 2024-11-21 PROCEDURE — 71260 CT THORAX DX C+: CPT

## 2024-11-21 PROCEDURE — 6360000004 HC RX CONTRAST MEDICATION: Performed by: INTERNAL MEDICINE

## 2024-11-21 RX ORDER — IOPAMIDOL 755 MG/ML
100 INJECTION, SOLUTION INTRAVASCULAR
Status: COMPLETED | OUTPATIENT
Start: 2024-11-21 | End: 2024-11-21

## 2024-11-21 RX ADMIN — IOPAMIDOL 100 ML: 755 INJECTION, SOLUTION INTRAVENOUS at 12:30

## 2024-11-25 RX ORDER — FLUTICASONE PROPIONATE AND SALMETEROL 250; 50 UG/1; UG/1
1 POWDER RESPIRATORY (INHALATION) EVERY 12 HOURS
Qty: 60 EACH | Refills: 3 | Status: SHIPPED | OUTPATIENT
Start: 2024-11-25

## 2024-12-02 ENCOUNTER — HOSPITAL ENCOUNTER (OUTPATIENT)
Dept: CARDIAC REHAB | Age: 64
Discharge: HOME | End: 2024-12-02
Payer: COMMERCIAL

## 2024-12-02 PROCEDURE — 94760 N-INVAS EAR/PLS OXIMETRY 1: CPT

## 2024-12-02 PROCEDURE — 94729 DIFFUSING CAPACITY: CPT

## 2024-12-02 PROCEDURE — 94060 EVALUATION OF WHEEZING: CPT

## 2024-12-02 PROCEDURE — 94727 GAS DIL/WSHOT DETER LNG VOL: CPT

## 2024-12-03 DIAGNOSIS — R05.3 CHRONIC COUGH: ICD-10-CM

## 2024-12-16 ENCOUNTER — OFFICE VISIT (OUTPATIENT)
Dept: PULMONOLOGY | Age: 64
End: 2024-12-16
Payer: COMMERCIAL

## 2024-12-16 ENCOUNTER — TELEPHONE (OUTPATIENT)
Dept: PULMONOLOGY | Age: 64
End: 2024-12-16

## 2024-12-16 ENCOUNTER — PREP FOR PROCEDURE (OUTPATIENT)
Dept: PULMONOLOGY | Age: 64
End: 2024-12-16

## 2024-12-16 VITALS
BODY MASS INDEX: 36.96 KG/M2 | SYSTOLIC BLOOD PRESSURE: 134 MMHG | WEIGHT: 264 LBS | DIASTOLIC BLOOD PRESSURE: 80 MMHG | OXYGEN SATURATION: 95 % | HEIGHT: 71 IN | HEART RATE: 95 BPM

## 2024-12-16 DIAGNOSIS — E66.9 OBESITY (BMI 30-39.9): ICD-10-CM

## 2024-12-16 DIAGNOSIS — G47.33 OSA (OBSTRUCTIVE SLEEP APNEA): Primary | ICD-10-CM

## 2024-12-16 DIAGNOSIS — J45.40 MODERATE PERSISTENT REACTIVE AIRWAY DISEASE WITHOUT COMPLICATION: ICD-10-CM

## 2024-12-16 DIAGNOSIS — G47.10 HYPERSOMNIA: ICD-10-CM

## 2024-12-16 DIAGNOSIS — R05.3 CHRONIC COUGH: ICD-10-CM

## 2024-12-16 PROCEDURE — 3017F COLORECTAL CA SCREEN DOC REV: CPT | Performed by: INTERNAL MEDICINE

## 2024-12-16 PROCEDURE — G8417 CALC BMI ABV UP PARAM F/U: HCPCS | Performed by: INTERNAL MEDICINE

## 2024-12-16 PROCEDURE — 1036F TOBACCO NON-USER: CPT | Performed by: INTERNAL MEDICINE

## 2024-12-16 PROCEDURE — 99215 OFFICE O/P EST HI 40 MIN: CPT | Performed by: INTERNAL MEDICINE

## 2024-12-16 PROCEDURE — G8482 FLU IMMUNIZE ORDER/ADMIN: HCPCS | Performed by: INTERNAL MEDICINE

## 2024-12-16 PROCEDURE — G8427 DOCREV CUR MEDS BY ELIG CLIN: HCPCS | Performed by: INTERNAL MEDICINE

## 2024-12-16 ASSESSMENT — ENCOUNTER SYMPTOMS
EYE DISCHARGE: 0
ABDOMINAL PAIN: 0
SHORTNESS OF BREATH: 1
ABDOMINAL DISTENTION: 0
BACK PAIN: 0
COUGH: 1
EYE ITCHING: 0

## 2024-12-16 ASSESSMENT — SLEEP AND FATIGUE QUESTIONNAIRES
HOW LIKELY ARE YOU TO NOD OFF OR FALL ASLEEP WHEN YOU ARE A PASSENGER IN A CAR FOR AN HOUR WITHOUT A BREAK: SLIGHT CHANCE OF DOZING
HOW LIKELY ARE YOU TO NOD OFF OR FALL ASLEEP WHILE SITTING QUIETLY AFTER LUNCH WITHOUT ALCOHOL: SLIGHT CHANCE OF DOZING
HOW LIKELY ARE YOU TO NOD OFF OR FALL ASLEEP WHILE SITTING AND TALKING TO SOMEONE: WOULD NEVER DOZE
HOW LIKELY ARE YOU TO NOD OFF OR FALL ASLEEP WHILE LYING DOWN TO REST IN THE AFTERNOON WHEN CIRCUMSTANCES PERMIT: MODERATE CHANCE OF DOZING
HOW LIKELY ARE YOU TO NOD OFF OR FALL ASLEEP IN A CAR, WHILE STOPPED FOR A FEW MINUTES IN TRAFFIC: WOULD NEVER DOZE
HOW LIKELY ARE YOU TO NOD OFF OR FALL ASLEEP WHILE SITTING INACTIVE IN A PUBLIC PLACE: MODERATE CHANCE OF DOZING
HOW LIKELY ARE YOU TO NOD OFF OR FALL ASLEEP WHILE WATCHING TV: MODERATE CHANCE OF DOZING
ESS TOTAL SCORE: 9
HOW LIKELY ARE YOU TO NOD OFF OR FALL ASLEEP WHILE SITTING AND READING: SLIGHT CHANCE OF DOZING

## 2024-12-16 NOTE — PROGRESS NOTES
[facility]    Social Connections     Social Connections and Isolation: 0   Housing Stability: Low Risk  (6/24/2024)    Housing Stability Vital Sign     Unable to Pay for Housing in the Last Year: No     Number of Places Lived in the Last Year: 1     Unstable Housing in the Last Year: No       Review of Systems   Constitutional:  Positive for fatigue.   HENT:  Negative for congestion and postnasal drip.    Eyes:  Negative for discharge and itching.   Respiratory:  Positive for cough and shortness of breath.    Cardiovascular:  Negative for chest pain and leg swelling.   Gastrointestinal:  Negative for abdominal distention and abdominal pain.   Endocrine: Negative for cold intolerance and heat intolerance.   Genitourinary:  Negative for enuresis and frequency.   Musculoskeletal:  Negative for arthralgias and back pain.   Allergic/Immunologic: Negative for environmental allergies and food allergies.   Neurological:  Negative for light-headedness and headaches.   Hematological:  Negative for adenopathy.   Psychiatric/Behavioral:  Negative for agitation and behavioral problems.        Objective:   /80   Pulse 95   Ht 1.803 m (5' 11\")   Wt 119.7 kg (264 lb)   SpO2 95%   BMI 36.82 kg/m²   Body mass index is 36.82 kg/m².      12/16/2024     2:16 PM 8/10/2022     2:38 PM 5/5/2021     1:28 PM 3/9/2021    12:53 AM 3/9/2021    12:52 AM 2/18/2021    10:16 AM 2/18/2021    10:09 AM   Sleep Medicine   Sitting and reading 1 1 1 1   1   Watching TV 2 1 1 1   1   Sitting, inactive in a public place (e.g. a theatre or a meeting) 2 2 0 2   2   As a passenger in a car for an hour without a break 1 2 2 1   1   Lying down to rest in the afternoon when circumstances permit 2 2 1 1   1   Sitting and talking to someone 0 1 0 1   1   Sitting quietly after a lunch without alcohol 1 1 1 1   1   In a car, while stopped for a few minutes in traffic 0 0 0 1   1   [place] Sleepiness Score 9 10 6 9   9   Neck (Inches) 17 14   18 18

## 2024-12-16 NOTE — ASSESSMENT & PLAN NOTE
Advised to loose weight with diet and exercise   
 C/w Inhalers  Avoid pets  Add Antihistamine  Get yearly flu vaccine  
 Patient has severe STEVEN  He is not able to tolerate the CPAP  Advised to go for the BIPAP titration study  Loose weight  
 Patient has severe STEVEN  He is not able to tolerate the CPAP  Advised to go for the BIPAP titration study  Loose weight  
 Suspected to be reactive airway disease  His CT chest showed no acute abnormalities  But I'll do a Bronchoscopy to make sure he has no endobronchial lesion  
No

## 2024-12-17 RX ORDER — SODIUM CHLORIDE 9 MG/ML
INJECTION, SOLUTION INTRAVENOUS PRN
Status: CANCELLED | OUTPATIENT
Start: 2024-12-17

## 2024-12-17 RX ORDER — SODIUM CHLORIDE 0.9 % (FLUSH) 0.9 %
5-40 SYRINGE (ML) INJECTION EVERY 12 HOURS SCHEDULED
Status: CANCELLED | OUTPATIENT
Start: 2024-12-17

## 2024-12-17 RX ORDER — SODIUM CHLORIDE 0.9 % (FLUSH) 0.9 %
5-40 SYRINGE (ML) INJECTION PRN
Status: CANCELLED | OUTPATIENT
Start: 2024-12-17

## 2024-12-23 ENCOUNTER — HOSPITAL ENCOUNTER (OUTPATIENT)
Dept: SLEEP CENTER | Age: 64
Discharge: HOME | End: 2024-12-23
Payer: COMMERCIAL

## 2024-12-23 VITALS — BODY MASS INDEX: 36.96 KG/M2 | HEIGHT: 71 IN | WEIGHT: 264 LBS

## 2024-12-23 DIAGNOSIS — G47.33 OSA (OBSTRUCTIVE SLEEP APNEA): ICD-10-CM

## 2024-12-23 PROCEDURE — 95811 POLYSOM 6/>YRS CPAP 4/> PARM: CPT

## 2024-12-24 NOTE — PROGRESS NOTES
12/24/2024  sleep study  for Negrito Nicole  1960 is complete.      Results are pending physician review.    Electronically signed by Emmett Polanco on 12/24/2024 at 5:10 AM

## 2024-12-26 ENCOUNTER — TELEPHONE (OUTPATIENT)
Dept: PULMONOLOGY | Age: 64
End: 2024-12-26

## 2024-12-26 RX ORDER — BENZONATATE 100 MG/1
100 CAPSULE ORAL 3 TIMES DAILY PRN
COMMUNITY

## 2024-12-26 NOTE — TELEPHONE ENCOUNTER
Called patient to let him know his bronch has been scheduled. Gave information to patient's wife. Procedure scheduled for 1/8/2025 at noon with an 11am arrival time. Pts wife voiced understanding.

## 2024-12-26 NOTE — PROGRESS NOTES
Surgery @ [facility] on 1/8/25 you will be called 1/7/25 with times  NPO AFTER MIDNIGHT   1. Enter thru the hospital main entrance on day of surgery, check in at the Information Desk. If you arrive prior to 6:00am, enter thru the ER entrance.    2. Follow the directions as prescribed by the doctor for your procedure and medications.         Morning of surgery take: Protonix with sip of water. Bring inhaler with you.          Stop vitamins, supplements and NSAIDS:  1/1/25    3. Check with your Doctor regarding stopping blood thinners and follow their instructions.    4. Do not smoke, vape or use chewing tobacco morning of surgery. Do not drink any alcoholic beverages 24 hours prior to surgery.       This includes NA Beer. No street drugs 7 days prior to surgery.    5. If you have dentures, contacts of glasses they will be removed before going to the OR; please bring a case.    6. Please bring picture ID, insurance card, paperwork from the doctor’s office (H & P, Consent, & card for implantable devices).    7. Take a shower with an antibacterial soap the night before surgery and the morning of surgery. Do not put anything on your skin      After your morning shower.    8. You will need a responsible adult to drive you home and check on you after surgery.

## 2024-12-30 ENCOUNTER — TELEPHONE (OUTPATIENT)
Dept: PULMONOLOGY | Age: 64
End: 2024-12-30

## 2024-12-30 NOTE — TELEPHONE ENCOUNTER
Please place labs for pts upcoming bronch.  Thank you   Duration Of Freeze Thaw-Cycle (Seconds): 0 Post-Care Instructions: I reviewed with the patient in detail post-care instructions. Patient is to wear sunprotection, and avoid picking at any of the treated lesions. Pt may apply Vaseline to crusted or scabbing areas. Consent: The patient's consent was obtained including but not limited to risks of crusting, scabbing, blistering, scarring, darker or lighter pigmentary change, recurrence, incomplete removal and infection. Detail Level: Zone

## 2025-01-02 NOTE — PROGRESS NOTES
Messageeduardo Allison at Dr Goyal office about patient not having any lab orders and she will let Dr Hernandez know. Dr Lopez out of office until the 7th.

## 2025-01-07 ENCOUNTER — ANESTHESIA EVENT (OUTPATIENT)
Dept: ENDOSCOPY | Age: 65
End: 2025-01-07
Payer: COMMERCIAL

## 2025-01-07 NOTE — ANESTHESIA PRE PROCEDURE
PM     11/15/2024 02:00 PM       CMP:   Lab Results   Component Value Date/Time     03/05/2024 09:20 AM    K 4.2 03/05/2024 09:20 AM     03/05/2024 09:20 AM    CO2 23 03/05/2024 09:20 AM    BUN 17 03/05/2024 09:20 AM    CREATININE 0.9 11/21/2024 12:32 PM    CREATININE 0.9 03/05/2024 09:20 AM    GFRAA >60 09/28/2022 09:53 AM    AGRATIO 2.0 02/23/2024 10:50 AM    LABGLOM >60 03/05/2024 09:20 AM    GLUCOSE 109 03/05/2024 09:20 AM    CALCIUM 8.7 03/05/2024 09:20 AM    BILITOT 2.5 03/05/2024 09:20 AM    ALKPHOS 56 03/05/2024 09:20 AM    AST 24 03/05/2024 09:20 AM    ALT 30 03/05/2024 09:20 AM       POC Tests: No results for input(s): \"POCGLU\", \"POCNA\", \"POCK\", \"POCCL\", \"POCBUN\", \"POCHEMO\", \"POCHCT\" in the last 72 hours.    Coags:   Lab Results   Component Value Date/Time    PROTIME 11.7 06/20/2022 08:20 AM    INR 0.94 06/20/2022 08:20 AM    APTT 29.8 03/03/2022 09:50 AM       HCG (If Applicable): No results found for: \"PREGTESTUR\", \"PREGSERUM\", \"HCG\", \"HCGQUANT\"     ABGs: No results found for: \"PHART\", \"PO2ART\", \"PQZ6DPM\", \"XBB5ZEB\", \"BEART\", \"X7ANLZXG\"     Type & Screen (If Applicable):  No results found for: \"LABABO\"    Drug/Infectious Status (If Applicable):  Lab Results   Component Value Date/Time    HIV Non-Reactive 03/26/2019 01:45 PM    HEPCAB NON REACTIVE 03/03/2022 09:50 AM       COVID-19 Screening (If Applicable):   Lab Results   Component Value Date/Time    COVID19 Not-Detected 01/31/2024 11:04 AM    COVID19 NOT DETECTED 03/03/2021 01:25 PM           Anesthesia Evaluation  Patient summary reviewed  Airway: Mallampati: III  TM distance: >3 FB   Neck ROM: full  Mouth opening: > = 3 FB   Dental: normal exam         Pulmonary:normal exam    (+)     sleep apnea:       asthma:                            Cardiovascular:    (+) hypertension:, hyperlipidemia                  Neuro/Psych:   (+) psychiatric history:            GI/Hepatic/Renal:   (+) liver disease:, morbid obesity          Endo/Other:

## 2025-01-07 NOTE — PROGRESS NOTES
Spoke with Ifeanyi at Dr Hernandez's office.   Per Ifeanyi JOSEPH and Estefany Hernandez will place order this afternoon and the patient can come in early prior to his bronch tomorrow.

## 2025-01-07 NOTE — PROGRESS NOTES
Notified patient surgery @ [facility] on 1/8/25 @ 1200, arrival 1000 for bloodwork. NPO status and morning medications reviewed. Understanding verbalized.

## 2025-01-08 ENCOUNTER — HOSPITAL ENCOUNTER (OUTPATIENT)
Age: 65
Setting detail: OUTPATIENT SURGERY
Discharge: HOME | End: 2025-01-08
Attending: INTERNAL MEDICINE | Admitting: INTERNAL MEDICINE
Payer: COMMERCIAL

## 2025-01-08 ENCOUNTER — ANESTHESIA (OUTPATIENT)
Dept: ENDOSCOPY | Age: 65
End: 2025-01-08
Payer: COMMERCIAL

## 2025-01-08 VITALS
HEIGHT: 71 IN | HEART RATE: 55 BPM | RESPIRATION RATE: 16 BRPM | BODY MASS INDEX: 36.96 KG/M2 | OXYGEN SATURATION: 94 % | TEMPERATURE: 98 F | WEIGHT: 264 LBS | SYSTOLIC BLOOD PRESSURE: 111 MMHG | DIASTOLIC BLOOD PRESSURE: 60 MMHG

## 2025-01-08 LAB
BASOPHILS # BLD: 0.02 K/UL
BASOPHILS NFR BLD: 0 % (ref 0–1)
EOSINOPHIL # BLD: 0.21 K/UL
EOSINOPHILS RELATIVE PERCENT: 4 % (ref 0–3)
ERYTHROCYTE [DISTWIDTH] IN BLOOD BY AUTOMATED COUNT: 13.2 % (ref 11.7–14.9)
HCT VFR BLD AUTO: 41.8 % (ref 42–52)
HGB BLD-MCNC: 14.6 G/DL (ref 13.5–18)
IMM GRANULOCYTES # BLD AUTO: 0.02 K/UL
IMM GRANULOCYTES NFR BLD: 0 %
INR PPP: 1
LYMPHOCYTES NFR BLD: 1.43 K/UL
LYMPHOCYTES RELATIVE PERCENT: 28 % (ref 24–44)
MCH RBC QN AUTO: 32.5 PG (ref 27–31)
MCHC RBC AUTO-ENTMCNC: 34.9 G/DL (ref 32–36)
MCV RBC AUTO: 93.1 FL (ref 78–100)
MONOCYTES NFR BLD: 0.41 K/UL
MONOCYTES NFR BLD: 8 % (ref 0–4)
NEUTROPHILS NFR BLD: 60 % (ref 36–66)
NEUTS SEG NFR BLD: 3.07 K/UL
PARTIAL THROMBOPLASTIN TIME: 28.1 SEC (ref 25.1–37.1)
PLATELET # BLD AUTO: 149 K/UL (ref 140–440)
PMV BLD AUTO: 10.3 FL (ref 7.5–11.1)
PROTHROMBIN TIME: 13.7 SEC (ref 11.7–14.5)
RBC # BLD AUTO: 4.49 M/UL (ref 4.6–6.2)
WBC OTHER # BLD: 5.2 K/UL (ref 4–10.5)

## 2025-01-08 PROCEDURE — 2709999900 HC NON-CHARGEABLE SUPPLY: Performed by: INTERNAL MEDICINE

## 2025-01-08 PROCEDURE — 87206 SMEAR FLUORESCENT/ACID STAI: CPT

## 2025-01-08 PROCEDURE — 87070 CULTURE OTHR SPECIMN AEROBIC: CPT

## 2025-01-08 PROCEDURE — 99221 1ST HOSP IP/OBS SF/LOW 40: CPT | Performed by: INTERNAL MEDICINE

## 2025-01-08 PROCEDURE — 6360000002 HC RX W HCPCS

## 2025-01-08 PROCEDURE — 85025 COMPLETE CBC W/AUTO DIFF WBC: CPT

## 2025-01-08 PROCEDURE — 7100000010 HC PHASE II RECOVERY - FIRST 15 MIN: Performed by: INTERNAL MEDICINE

## 2025-01-08 PROCEDURE — 6360000002 HC RX W HCPCS: Performed by: ANESTHESIOLOGY

## 2025-01-08 PROCEDURE — 3609010800 HC BRONCHOSCOPY ALVEOLAR LAVAGE: Performed by: INTERNAL MEDICINE

## 2025-01-08 PROCEDURE — 87102 FUNGUS ISOLATION CULTURE: CPT

## 2025-01-08 PROCEDURE — 2500000003 HC RX 250 WO HCPCS

## 2025-01-08 PROCEDURE — 7100000001 HC PACU RECOVERY - ADDTL 15 MIN: Performed by: INTERNAL MEDICINE

## 2025-01-08 PROCEDURE — 87205 SMEAR GRAM STAIN: CPT

## 2025-01-08 PROCEDURE — 3700000001 HC ADD 15 MINUTES (ANESTHESIA): Performed by: INTERNAL MEDICINE

## 2025-01-08 PROCEDURE — 85730 THROMBOPLASTIN TIME PARTIAL: CPT

## 2025-01-08 PROCEDURE — 2580000003 HC RX 258

## 2025-01-08 PROCEDURE — 7100000011 HC PHASE II RECOVERY - ADDTL 15 MIN: Performed by: INTERNAL MEDICINE

## 2025-01-08 PROCEDURE — 85610 PROTHROMBIN TIME: CPT

## 2025-01-08 PROCEDURE — 87116 MYCOBACTERIA CULTURE: CPT

## 2025-01-08 PROCEDURE — 3700000000 HC ANESTHESIA ATTENDED CARE: Performed by: INTERNAL MEDICINE

## 2025-01-08 PROCEDURE — 88112 CYTOPATH CELL ENHANCE TECH: CPT | Performed by: PATHOLOGY

## 2025-01-08 PROCEDURE — 7100000000 HC PACU RECOVERY - FIRST 15 MIN: Performed by: INTERNAL MEDICINE

## 2025-01-08 PROCEDURE — 31622 DX BRONCHOSCOPE/WASH: CPT | Performed by: INTERNAL MEDICINE

## 2025-01-08 PROCEDURE — 88305 TISSUE EXAM BY PATHOLOGIST: CPT | Performed by: PATHOLOGY

## 2025-01-08 RX ORDER — OXYCODONE HYDROCHLORIDE 5 MG/1
5 TABLET ORAL PRN
Status: DISCONTINUED | OUTPATIENT
Start: 2025-01-08 | End: 2025-01-08 | Stop reason: HOSPADM

## 2025-01-08 RX ORDER — SODIUM CHLORIDE 9 MG/ML
INJECTION, SOLUTION INTRAVENOUS PRN
Status: DISCONTINUED | OUTPATIENT
Start: 2025-01-08 | End: 2025-01-08 | Stop reason: HOSPADM

## 2025-01-08 RX ORDER — FENTANYL CITRATE 50 UG/ML
INJECTION, SOLUTION INTRAMUSCULAR; INTRAVENOUS
Status: DISCONTINUED | OUTPATIENT
Start: 2025-01-08 | End: 2025-01-08 | Stop reason: SDUPTHER

## 2025-01-08 RX ORDER — ONDANSETRON 2 MG/ML
INJECTION INTRAMUSCULAR; INTRAVENOUS
Status: DISCONTINUED | OUTPATIENT
Start: 2025-01-08 | End: 2025-01-08 | Stop reason: SDUPTHER

## 2025-01-08 RX ORDER — PROPOFOL 10 MG/ML
INJECTION, EMULSION INTRAVENOUS
Status: DISCONTINUED | OUTPATIENT
Start: 2025-01-08 | End: 2025-01-08 | Stop reason: SDUPTHER

## 2025-01-08 RX ORDER — LIDOCAINE HYDROCHLORIDE 20 MG/ML
INJECTION, SOLUTION INTRAVENOUS
Status: DISCONTINUED | OUTPATIENT
Start: 2025-01-08 | End: 2025-01-08 | Stop reason: SDUPTHER

## 2025-01-08 RX ORDER — DEXAMETHASONE SODIUM PHOSPHATE 4 MG/ML
8 INJECTION, SOLUTION INTRA-ARTICULAR; INTRALESIONAL; INTRAMUSCULAR; INTRAVENOUS; SOFT TISSUE ONCE
Status: COMPLETED | OUTPATIENT
Start: 2025-01-08 | End: 2025-01-08

## 2025-01-08 RX ORDER — NALOXONE HYDROCHLORIDE 0.4 MG/ML
INJECTION, SOLUTION INTRAMUSCULAR; INTRAVENOUS; SUBCUTANEOUS PRN
Status: DISCONTINUED | OUTPATIENT
Start: 2025-01-08 | End: 2025-01-08 | Stop reason: HOSPADM

## 2025-01-08 RX ORDER — SODIUM CHLORIDE 0.9 % (FLUSH) 0.9 %
5-40 SYRINGE (ML) INJECTION EVERY 12 HOURS SCHEDULED
Status: DISCONTINUED | OUTPATIENT
Start: 2025-01-08 | End: 2025-01-08 | Stop reason: HOSPADM

## 2025-01-08 RX ORDER — IPRATROPIUM BROMIDE AND ALBUTEROL SULFATE 2.5; .5 MG/3ML; MG/3ML
1 SOLUTION RESPIRATORY (INHALATION)
Status: DISCONTINUED | OUTPATIENT
Start: 2025-01-08 | End: 2025-01-08 | Stop reason: HOSPADM

## 2025-01-08 RX ORDER — SODIUM CHLORIDE 0.9 % (FLUSH) 0.9 %
5-40 SYRINGE (ML) INJECTION PRN
Status: DISCONTINUED | OUTPATIENT
Start: 2025-01-08 | End: 2025-01-08 | Stop reason: HOSPADM

## 2025-01-08 RX ORDER — PROCHLORPERAZINE EDISYLATE 5 MG/ML
5 INJECTION INTRAMUSCULAR; INTRAVENOUS
Status: DISCONTINUED | OUTPATIENT
Start: 2025-01-08 | End: 2025-01-08 | Stop reason: HOSPADM

## 2025-01-08 RX ORDER — GLYCOPYRROLATE 0.2 MG/ML
0.1 INJECTION INTRAMUSCULAR; INTRAVENOUS ONCE
Status: COMPLETED | OUTPATIENT
Start: 2025-01-08 | End: 2025-01-08

## 2025-01-08 RX ORDER — ROCURONIUM BROMIDE 10 MG/ML
INJECTION, SOLUTION INTRAVENOUS
Status: DISCONTINUED | OUTPATIENT
Start: 2025-01-08 | End: 2025-01-08 | Stop reason: SDUPTHER

## 2025-01-08 RX ORDER — ACETAMINOPHEN 325 MG/1
650 TABLET ORAL ONCE
Status: DISCONTINUED | OUTPATIENT
Start: 2025-01-08 | End: 2025-01-08 | Stop reason: HOSPADM

## 2025-01-08 RX ORDER — FENTANYL CITRATE 50 UG/ML
25 INJECTION, SOLUTION INTRAMUSCULAR; INTRAVENOUS EVERY 5 MIN PRN
Status: DISCONTINUED | OUTPATIENT
Start: 2025-01-08 | End: 2025-01-08 | Stop reason: HOSPADM

## 2025-01-08 RX ORDER — ONDANSETRON 2 MG/ML
4 INJECTION INTRAMUSCULAR; INTRAVENOUS
Status: DISCONTINUED | OUTPATIENT
Start: 2025-01-08 | End: 2025-01-08 | Stop reason: HOSPADM

## 2025-01-08 RX ORDER — SODIUM CHLORIDE, SODIUM LACTATE, POTASSIUM CHLORIDE, CALCIUM CHLORIDE 600; 310; 30; 20 MG/100ML; MG/100ML; MG/100ML; MG/100ML
INJECTION, SOLUTION INTRAVENOUS
Status: DISCONTINUED | OUTPATIENT
Start: 2025-01-08 | End: 2025-01-08 | Stop reason: SDUPTHER

## 2025-01-08 RX ORDER — OXYCODONE HYDROCHLORIDE 5 MG/1
10 TABLET ORAL PRN
Status: DISCONTINUED | OUTPATIENT
Start: 2025-01-08 | End: 2025-01-08 | Stop reason: HOSPADM

## 2025-01-08 RX ADMIN — PROPOFOL 200 MG: 10 INJECTION, EMULSION INTRAVENOUS at 12:34

## 2025-01-08 RX ADMIN — GLYCOPYRROLATE 0.1 MG: 0.2 INJECTION INTRAMUSCULAR; INTRAVENOUS at 11:38

## 2025-01-08 RX ADMIN — ROCURONIUM BROMIDE 50 MG: 10 INJECTION, SOLUTION INTRAVENOUS at 12:34

## 2025-01-08 RX ADMIN — SODIUM CHLORIDE, POTASSIUM CHLORIDE, SODIUM LACTATE AND CALCIUM CHLORIDE: 600; 310; 30; 20 INJECTION, SOLUTION INTRAVENOUS at 12:30

## 2025-01-08 RX ADMIN — LIDOCAINE HYDROCHLORIDE 100 MG: 20 INJECTION, SOLUTION INTRAVENOUS at 12:34

## 2025-01-08 RX ADMIN — DEXAMETHASONE SODIUM PHOSPHATE 8 MG: 4 INJECTION, SOLUTION INTRAMUSCULAR; INTRAVENOUS at 11:39

## 2025-01-08 RX ADMIN — ONDANSETRON 4 MG: 2 INJECTION INTRAMUSCULAR; INTRAVENOUS at 12:41

## 2025-01-08 RX ADMIN — FENTANYL CITRATE 100 MCG: 50 INJECTION, SOLUTION INTRAMUSCULAR; INTRAVENOUS at 12:31

## 2025-01-08 RX ADMIN — SUGAMMADEX 400 MG: 100 INJECTION, SOLUTION INTRAVENOUS at 12:54

## 2025-01-08 ASSESSMENT — PAIN - FUNCTIONAL ASSESSMENT
PAIN_FUNCTIONAL_ASSESSMENT: 0-10
PAIN_FUNCTIONAL_ASSESSMENT: NONE - DENIES PAIN

## 2025-01-08 ASSESSMENT — PAIN SCALES - GENERAL: PAINLEVEL_OUTOF10: 0

## 2025-01-08 NOTE — PROGRESS NOTES
Several attempts have been made about patient not having orders in for labs  from Dr Hernandez and there are still no labs ordered at this time. Dr Hernandez was out until the 7th.

## 2025-01-08 NOTE — PROGRESS NOTES
PT TRANSFERRED TO PACU ON CART ON ROOM AIR WITH THIS NURSE AND CRNA. PT AWAKE AND RESPONSIVE. DENIES C/O OF NEEDS AT THIS TIME. NO SIGNS OR SYMPTOMS OF DISTRESS NOTED AT THIS TIME.

## 2025-01-08 NOTE — H&P
Pulmonary    History and Physical      Name:  Negrito Nicole /Age/Sex: 1960  (64 y.o. male)   MRN & CSN:  [id number] & [id number] Admission Date/Time: 2025 10:06 AM   Location:  ENDO/NONE PCP: Roberto Baumann MD       Hospital Day: 1    Assessment and Plan:   Negrito Nicole is a 64 y.o.  male  who presents with Chronic cough    Chronic Cough with negative work up till now  STEVEN  Obesity  Hypersomnia  Reactive airway disease    PLAN    C/w Inhalers  PPI  CPAP qhs and prn  Keep sats > 92%  NPO  Bronch to see if any endobronchial mass  C/w present management    Diet Diet NPO Exceptions are: Sips of Water with Meds   DVT Prophylaxis [] Lovenox, []  Heparin, [] SCDs, [] No VTE prophylaxis, patient ambulating   GI Prophylaxis [] PPI, [] H2 Blocker, [] No GI prophylaxis, patient is receiving diet/Tube Feeds   Code Status Full Code   Disposition Patient requires continued admission due to    MDM [] Low, [] Moderate,[]  High  Patient's risk as above due to      History of Present Illness:     Chief Complaint: Chronic cough  Negrito Nicole is a 64 y.o.  male  who presents with Cough x 2.5 months. He has GERD on PPI. He is on inhalers. All the work up till now has been negative. At this time he is NPO. He is lying in the bed. He is in mild resp distress       10-14 point ROS reviewed negative, unless as noted above    Objective:   No intake or output data in the 24 hours ending 25 1225   Vitals:   Vitals:    25 1029   BP: (!) 160/87   Pulse: 58   Resp: 18   Temp: 97.5 °F (36.4 °C)   SpO2: 95%     Physical Exam:    GEN Awake male, sitting upright in bed in no apparent distress. Appears given     age.  EYES Pupils are equally round.  No scleral erythema, discharge, or conjunctivitis.  HENT Mucous membranes are moist.   NECK No apparent thyromegaly or masses.  RESP Clear to auscultation, no wheezes, rales or rhonchi.  Symmetric chest movement while on room air.  CARDIO/VASC S1/S2 auscultated. Regular rate

## 2025-01-08 NOTE — PROGRESS NOTES
1304 pt received from endo. Report received from Kerry BUTLER and Rich LING. Pt a/ox4, denies pain, VSS, room air  1308 warm blanket applied  1314 pt sat up in bed, education provided   1338 pt taken to [facility], report given to Brisa. Family at bedside. All questions answered

## 2025-01-08 NOTE — PROGRESS NOTES
1340 - received patient from [facility], report received from Alecia RN, patient A&O, denies pain, reports his lungs are just irritated, denies nausea, family at bedside, call light within reach, patient npo  1440 - discharge instructions given to patient, understanding voiced without any further questions at this time  1445 - VSS, denies pain or nausea  1450 - patient ambulated to bathroom  1455 - patient dressing  1505 - given starry, iv removed  1520 - given apple sauce, waiting for ride  1542 - patient left sds via wheelchair accompanied by mj

## 2025-01-08 NOTE — ANESTHESIA POSTPROCEDURE EVALUATION
Department of Anesthesiology  Postprocedure Note    Patient: Negrito Nicole  MRN: [id number]  YOB: 1960  Date of evaluation: 1/8/2025    Procedure Summary       Date: 01/08/25 Room / Location: [place] [address] / [facility]    Anesthesia Start: 1230 Anesthesia Stop: 1306    Procedure: BRONCHOSCOPY ALVEOLAR LAVAGE (Abdomen) Diagnosis:       Chronic cough      (Chronic cough [R05.3])    Surgeons: Bucky Hernandez MD Responsible Provider: Brandyn Shaikh MD    Anesthesia Type: General ASA Status: 3            Anesthesia Type: General    Antoine Phase I: Antoine Score: 10    Antoine Phase II:      Anesthesia Post Evaluation    Patient location during evaluation: PACU  Patient participation: complete - patient participated  Level of consciousness: sleepy but conscious  Airway patency: patent  Nausea & Vomiting: no nausea and no vomiting  Cardiovascular status: hemodynamically stable  Respiratory status: acceptable, spontaneous ventilation and room air  Hydration status: stable  Pain management: adequate    No notable events documented.

## 2025-01-08 NOTE — DISCHARGE INSTRUCTIONS
[facility]  [phone]    Do not drive, work around machines or use equipment.  Do not drink any alcoholic beverages.  Do not smoke while alone.  Avoid making important decisions.  Plan to spend a quiet, relaxed evening @ home.  Resume normal activities as you begin to feel better.  Eat lightly for your first meal, then gradually increase your diet to what is normal for you.  In case of nausea, avoid food and drink only clear liquids.  Resume food as nausea ceases.  Notify your surgeon if you experience fever, chills, large amount of bleeding, difficulty breathing, persistent nausea and vomiting or any other disturbing problem.  Call for a follow-up appointment with your surgeon.       Follow up next week     January 18, 2024       Marichuy Chaudhary MD  [address]  [facility] [address]  Via In Basket      Patient: Raad Brody   YOB: 1961   Date of Visit: 1/18/2024       Dear Dr. Chaudhary:    Thank you for referring Alex Brody to me for evaluation. Below are my notes for this visit with him.    If you have questions, please do not hesitate to call me. I look forward to following your patient along with you.      Sincerely,        Antony Elmore MD        CC: No Recipients  Antony Elmore MD  1/18/2024 12:58 PM  Signed    [facility] Follow-Up Progress Note    Today's Date: 1/18/2024    PCP: Marichuy Chaudhary MD    Chief Complaint: Cardiology follow-up for aortic stenosis and hypertension.        HPI: Patient denies chest pain or chest pressure.    He has no shortness of breath with regular activities.    He denies palpitations, dizziness, or syncope.    ROS: Gen -no fevers or chills  GI - no nausea or vomiting   - no dysuria or hematuria  Musculoskeletal - no low back pain or myalgia  Neuro -no headache or focal weakness  Skin -no rash or wounds  Psychiatric: No depression or anxiety    Current Medications:   Current Medications    ASPIRIN 81 MG TABLET        BLOOD GLUCOSE MONITORING SUPPL W/DEVICE KIT    Brand as per insurance . Check bg few 3 times per week    BLOOD GLUCOSE TEST STRIP    Test blood sugar 3 times per week . Diagnosis: E11.09 . Meter: as per insurance    DULOXETINE (CYMBALTA) 60 MG CAPSULE    at bedtime.    FLUTICASONE (FLONASE) 50 MCG/ACT NASAL SPRAY    SHAKE LIQUID AND USE 2 SPRAYS IN EACH NOSTRIL DAILY    LANCETS THIN MISC    Check blood sugar three times per week .  E 11.09 .  Brand as per insurance    LISINOPRIL (ZESTRIL) 20 MG TABLET    TAKE 1 TABLET BY MOUTH DAILY    METOPROLOL SUCCINATE (TOPROL-XL) 50 MG 24 HR TABLET    Take 1 tablet by mouth daily.    MULTIPLE VITAMINS-MINERALS (VITAMIN - THERAPEUTIC MULTIVITAMINS W/MINERALS) TABLET         ROSUVASTATIN (CRESTOR) 20 MG TABLET    TAKE 1 TABLET BY MOUTH DAILY    SYNJARDY XR 12.5-1000 MG TABLET SR 24 HR    TAKE 1 TABLET IN THE       MORNING AND 1 TABLET IN THEEVENING    TAFLUPROST (ZIOPTAN) 0.0015 % OPHTHALMIC SOLUTION        TESTOSTERONE 1% (ANDROGEL) 25MG/2.5G GEL PACKET    Apply 1 packet topically to the affected area daily as directed         Examination:   Blood pressure 132/60, pulse 68, height 5' 9\" (1.753 m), weight 97.5 kg (215 lb).  Weight    01/18/24 1235   Weight: 97.5 kg (215 lb)     General - alert and oriented, no distress  HENT -normocephalic ,  mucosa  moist   Eyes - pupils equal, round, and reactive to light, normal conjunctiva  Neck - JVD not elevated, carotids normal upstroke with no bruit   Respiratory - good air exchange with no crackles or wheezing  Cardiovascular - regular rate and rhythm, normal S1 and S2, II/VI BELKYS at RUSB.  No edema in bilateral lower extremities.  Gastrointestinal  - soft, nontender  Skin - warm, dry, no rash  Neurological  - alert, no focal deficits  Cognition & Speech - speech clear and coherent  Psychiatric - cooperative    Clinical Data:  The following were personally visualized and interpreted by me:    Cardiac cath: 1/22 ([facility]) -no significant CAD    Stress echocardiogram: 12/20 -9:22, 86% PMHR, normal LV systolic function, 1.5 mm ST depression in inferior leads, no evidence of ischemia by echocardiogram    Echocardiogram: 12/20-normal LV systolic function, mild aortic stenosis (mean gradient =17 mmHg); 5/23-normal LV function, no regional wall motion abnormality, mild aortic stenosis (mean gradient = 10 mmHg)    Carotid Doppler: 7/21 no significant stenosis    Labs: 11/20 LDL 16; 3/21 LDL 67; 9/22 LDL 53; 6/23 LDL 61    Problems/Plans:  1. Atherosclerosis of coronary artery    -Cardiac cath approximately 10 years ago-nonobstructive CAD   -Cath in 1/22 with no CAD    2. Essential hypertension    3. Mixed hyperlipidemia    4.  Aortic stenosis    5.   Sleep apnea    Patient with no symptoms chest pain, shortness of breath, palpitations, or dizziness.  He has had no syncopal events.    Echocardiogram in 12/23 showed normal LV function with mild aortic stenosis.  Mean gradient was 10 mmHg.    Cardiac catheterization 1/22 showed no significant CAD.        Recommend to continue current dose of aspirin, Crestor, metoprolol, and lisinopril.      I encouraged him to eat a low-fat, low-salt diet try to do 30 minutes a day of aerobic activity.    Return to clinic in 1 year.  Asked him to call with any new symptoms      Antony Elmore MD       The above note was created using dictation using voice recognition software.  While every effort was made to correct the dictation, an occasional error may have been missed.

## 2025-01-09 LAB
MICROORGANISM SPEC CULT: NORMAL
MICROORGANISM SPEC CULT: NORMAL
MICROORGANISM/AGENT SPEC: NORMAL
MICROORGANISM/AGENT SPEC: NORMAL
NON-GYN CYTOLOGY REPORT: NORMAL
SPECIMEN DESCRIPTION: NORMAL
SPECIMEN DESCRIPTION: NORMAL

## 2025-01-10 LAB
MICROORGANISM SPEC CULT: ABNORMAL
MICROORGANISM/AGENT SPEC: ABNORMAL
SPECIMEN DESCRIPTION: ABNORMAL

## 2025-01-20 LAB
MICROORGANISM SPEC CULT: NORMAL
MICROORGANISM/AGENT SPEC: NORMAL
SPECIMEN DESCRIPTION: NORMAL

## 2025-01-21 LAB
MICROORGANISM SPEC CULT: NORMAL
MICROORGANISM/AGENT SPEC: NORMAL
SPECIMEN DESCRIPTION: NORMAL

## 2025-01-27 LAB
MICROORGANISM SPEC CULT: NORMAL
MICROORGANISM/AGENT SPEC: NORMAL
SPECIMEN DESCRIPTION: NORMAL

## 2025-01-28 ENCOUNTER — OFFICE VISIT (OUTPATIENT)
Dept: PULMONOLOGY | Age: 65
End: 2025-01-28
Payer: COMMERCIAL

## 2025-01-28 VITALS
HEIGHT: 71 IN | OXYGEN SATURATION: 95 % | HEART RATE: 68 BPM | DIASTOLIC BLOOD PRESSURE: 80 MMHG | RESPIRATION RATE: 16 BRPM | BODY MASS INDEX: 38.36 KG/M2 | SYSTOLIC BLOOD PRESSURE: 132 MMHG | WEIGHT: 274 LBS

## 2025-01-28 DIAGNOSIS — R05.3 CHRONIC COUGH: ICD-10-CM

## 2025-01-28 DIAGNOSIS — G47.10 HYPERSOMNIA: ICD-10-CM

## 2025-01-28 DIAGNOSIS — E66.9 OBESITY (BMI 30-39.9): ICD-10-CM

## 2025-01-28 DIAGNOSIS — G47.39 COMPLEX SLEEP APNEA SYNDROME: Primary | ICD-10-CM

## 2025-01-28 LAB
MICROORGANISM SPEC CULT: NORMAL
MICROORGANISM/AGENT SPEC: NORMAL
SPECIMEN DESCRIPTION: NORMAL

## 2025-01-28 PROCEDURE — 1036F TOBACCO NON-USER: CPT | Performed by: INTERNAL MEDICINE

## 2025-01-28 PROCEDURE — G8427 DOCREV CUR MEDS BY ELIG CLIN: HCPCS | Performed by: INTERNAL MEDICINE

## 2025-01-28 PROCEDURE — 3017F COLORECTAL CA SCREEN DOC REV: CPT | Performed by: INTERNAL MEDICINE

## 2025-01-28 PROCEDURE — 99215 OFFICE O/P EST HI 40 MIN: CPT | Performed by: INTERNAL MEDICINE

## 2025-01-28 PROCEDURE — G8417 CALC BMI ABV UP PARAM F/U: HCPCS | Performed by: INTERNAL MEDICINE

## 2025-01-28 RX ORDER — PANTOPRAZOLE SODIUM 40 MG/1
40 FOR SUSPENSION ORAL
Qty: 30 EACH | Refills: 3 | Status: SHIPPED | OUTPATIENT
Start: 2025-01-28 | End: 2025-05-28

## 2025-01-28 ASSESSMENT — ENCOUNTER SYMPTOMS
EYE DISCHARGE: 0
ABDOMINAL DISTENTION: 0
BACK PAIN: 0
SHORTNESS OF BREATH: 0
COUGH: 0
ABDOMINAL PAIN: 0
EYE ITCHING: 0

## 2025-01-28 NOTE — PROGRESS NOTES
BRONCHOSCOPY ALVEOLAR LAVAGE performed by Bucky Hernandez MD at [facility] ENDOSCOPY    COLONOSCOPY  2011    Dr. Marco Yuen    COLONOSCOPY  09/05/2018    Dr. Romero    IR BIOPSY LIVER PERCUTANEOUS  7/6/2022    IR BIOPSY LIVER PERCUTANEOUS 7/6/2022 [facility] SPECIAL PROCEDURES    LIPOMA RESECTION Left 2011    Dr. Galvin    THROAT SURGERY  2010    for [place]EVEN [place]    TOTAL KNEE ARTHROPLASTY Left 3/13/2024    LEFT KNEE TOTAL ARTHROPLASTY ROBOTIC performed by Thor Gross MD at [facility] OR    UMBILICAL HERNIA REPAIR  04/18/2018       Social History     Socioeconomic History    Marital status:    Occupational History    Occupation: retired     Employer: [employer]     Comment: 26 years [employer] (retired 2015)   Tobacco Use    Smoking status: Never    Smokeless tobacco: Former   Vaping Use    Vaping status: Never Used   Substance and Sexual Activity    Alcohol use: Yes     Alcohol/week: 4.0 standard drinks of alcohol     Types: 4 Cans of beer per week     Comment: occ    Drug use: No    Sexual activity: Not Currently     Partners: Female   Social History Narrative    Lives with wife, twin sons         Social Determinants of Health     Financial Resource Strain: Low Risk  (6/24/2024)    Overall Financial Resource Strain (CARDIA)     Difficulty of Paying Living Expenses: Not hard at all   Food Insecurity: No Food Insecurity (6/24/2024)    Hunger Vital Sign     Worried About Running Out of Food in the Last Year: Never true     Ran Out of Food in the Last Year: Never true   Transportation Needs: No Transportation Needs (6/24/2024)    PRAPARE - Transportation     Lack of Transportation (Medical): No     Lack of Transportation (Non-Medical): No   Physical Activity: Insufficiently Active (2/21/2024)    Exercise Vital Sign     Days of Exercise per Week: 2 days     Minutes of Exercise per Session: 50 min   Stress: Not on File (5/25/2023)    Received from RODERICK VAUGHN    Stress     Stress:

## 2025-02-03 LAB
MICROORGANISM SPEC CULT: NORMAL
MICROORGANISM/AGENT SPEC: NORMAL
SPECIMEN DESCRIPTION: NORMAL

## 2025-02-04 LAB
MICROORGANISM SPEC CULT: NORMAL
MICROORGANISM/AGENT SPEC: NORMAL
SPECIMEN DESCRIPTION: NORMAL

## 2025-02-10 LAB
MICROORGANISM SPEC CULT: NORMAL
MICROORGANISM/AGENT SPEC: NORMAL
SPECIMEN DESCRIPTION: NORMAL

## 2025-02-11 LAB
MICROORGANISM SPEC CULT: NORMAL
MICROORGANISM/AGENT SPEC: NORMAL
SPECIMEN DESCRIPTION: NORMAL

## 2025-02-18 LAB
MICROORGANISM SPEC CULT: NORMAL
MICROORGANISM/AGENT SPEC: NORMAL
SPECIMEN DESCRIPTION: NORMAL

## 2025-02-20 ENCOUNTER — OFFICE VISIT MH/BH (OUTPATIENT)
Dept: BARIATRICS/WEIGHT MGMT | Age: 65
End: 2025-02-20
Payer: COMMERCIAL

## 2025-02-20 ENCOUNTER — OFFICE VISIT (OUTPATIENT)
Dept: ORTHOPEDIC SURGERY | Age: 65
End: 2025-02-20

## 2025-02-20 VITALS
OXYGEN SATURATION: 96 % | HEIGHT: 70 IN | BODY MASS INDEX: 39.67 KG/M2 | WEIGHT: 277.1 LBS | DIASTOLIC BLOOD PRESSURE: 78 MMHG | SYSTOLIC BLOOD PRESSURE: 118 MMHG | HEART RATE: 63 BPM

## 2025-02-20 VITALS — OXYGEN SATURATION: 98 % | WEIGHT: 278 LBS | BODY MASS INDEX: 39.8 KG/M2 | HEIGHT: 70 IN | HEART RATE: 64 BPM

## 2025-02-20 DIAGNOSIS — Z96.652 STATUS POST LEFT KNEE REPLACEMENT: ICD-10-CM

## 2025-02-20 DIAGNOSIS — R60.9 EDEMA, UNSPECIFIED TYPE: Primary | ICD-10-CM

## 2025-02-20 DIAGNOSIS — Z79.899 MEDICATION MANAGEMENT: ICD-10-CM

## 2025-02-20 DIAGNOSIS — E66.01 MORBID OBESITY WITH BMI OF 40.0-44.9, ADULT: Primary | ICD-10-CM

## 2025-02-20 LAB
ALBUMIN SERPL-MCNC: 4.2 G/DL (ref 3.4–5)
ALBUMIN/GLOB SERPL: 2.1 {RATIO} (ref 1.1–2.2)
ALP SERPL-CCNC: 68 U/L (ref 40–129)
ALT SERPL-CCNC: 47 U/L (ref 10–40)
ANION GAP SERPL CALCULATED.3IONS-SCNC: 11 MMOL/L (ref 3–16)
AST SERPL-CCNC: 36 U/L (ref 15–37)
BILIRUB SERPL-MCNC: 1.2 MG/DL (ref 0–1)
BUN SERPL-MCNC: 17 MG/DL (ref 7–20)
CALCIUM SERPL-MCNC: 9.3 MG/DL (ref 8.3–10.6)
CHLORIDE SERPL-SCNC: 108 MMOL/L (ref 99–110)
CO2 SERPL-SCNC: 22 MMOL/L (ref 21–32)
CREAT SERPL-MCNC: 1 MG/DL (ref 0.8–1.3)
GFR SERPLBLD CREATININE-BSD FMLA CKD-EPI: 84 ML/MIN/{1.73_M2}
GLUCOSE SERPL-MCNC: 116 MG/DL (ref 70–99)
POTASSIUM SERPL-SCNC: 4.4 MMOL/L (ref 3.5–5.1)
PROT SERPL-MCNC: 6.2 G/DL (ref 6.4–8.2)
SODIUM SERPL-SCNC: 141 MMOL/L (ref 136–145)

## 2025-02-20 PROCEDURE — 99214 OFFICE O/P EST MOD 30 MIN: CPT | Performed by: NURSE PRACTITIONER

## 2025-02-20 RX ORDER — PHENTERMINE HYDROCHLORIDE 37.5 MG/1
37.5 TABLET ORAL
Qty: 30 TABLET | Refills: 0 | Status: SHIPPED | OUTPATIENT
Start: 2025-02-20 | End: 2025-03-22

## 2025-02-20 ASSESSMENT — ENCOUNTER SYMPTOMS
BACK PAIN: 0
SHORTNESS OF BREATH: 0
NAUSEA: 0
FACIAL SWELLING: 0
COUGH: 0
RHINORRHEA: 0

## 2025-02-20 NOTE — PROGRESS NOTES
Patient returns with right knee pain following a TKA DOS 3/14/24. Patient states since surgery he has always had pain and swelling. States more recently pain has been more severe, especially when using leg. Patient describes pain from the knee radiating down. Rates it today a 0/10.

## 2025-02-20 NOTE — PROGRESS NOTES
Chief Complaint   Patient presents with    Bariatric, Initial Visit    Weight Management     NP - 1st Med WM Visit         SUBJECTIVE:    HPI: Patient is here with complaints of weight gain and inability to lose weight per self.  Inquiring about weight loss medications to assist with their weight loss goal.    Obesity with a BMI of Body mass index is 40.05 kg/m²..    Patient has experienced weight regain since last seen in office.    Any history of glaucoma? Denies    Any history of drug abuse? Denies    Any history of CAD, uncontrolled HTN, arrhythmias, stroke, or CHF??  Denies    Any history of hyperthyroidism? Denies    Any current or recent use of MAOIs? Denies    Current dietary measures: not tracking calories or following a specific diet plan    Current exercise measures: denies; recent knee repair    I have reviewed the patient's(pertinent information to this visit) medical history, family history(scanned in  the [employer] under \"patient questioner\"), social history and review of systems with the patient today in the office.          Past Surgical History:   Procedure Laterality Date    BRONCHOSCOPY N/A 1/8/2025    BRONCHOSCOPY ALVEOLAR LAVAGE performed by Bucky Hernandez MD at [facility] ENDOSCOPY    COLONOSCOPY  2011    Dr. Marco Yuen    COLONOSCOPY  09/05/2018    Dr. Romero    IR BIOPSY LIVER PERCUTANEOUS  7/6/2022    IR BIOPSY LIVER PERCUTANEOUS 7/6/2022 [facility] SPECIAL PROCEDURES    LIPOMA RESECTION Left 2011    Dr. Galvin    THROAT SURGERY  2010    for STEVEN [facility]    TOTAL KNEE ARTHROPLASTY Left 3/13/2024    LEFT KNEE TOTAL ARTHROPLASTY ROBOTIC performed by Thor Gross MD at [facility] OR    UMBILICAL HERNIA REPAIR  04/18/2018       Past Medical History:   Diagnosis Date    Asthma     Bruised ribs 1999    running fall    Chronic shoulder pain     Contusion of ribs 2011    \"dislocated or bruised ribs center of rt back.    Depression 2011    Heel pain, bilateral 09/29/2014    hemochromatosis (HCC)

## 2025-02-20 NOTE — PATIENT INSTRUCTIONS
Central scheduling [phone] will be calling you to schedule your ultrasound.  If you do not hear from them with in a week give them a call.   Follow up in 2 weeks with Dr. Gross.

## 2025-02-20 NOTE — PROGRESS NOTES
2/20/2025   Chief Complaint   Patient presents with    Knee Pain     TKA DOS: 3/13/24        History of Present Illness:                             Negrito Nicole is a 64 y.o. male returning to the office today with increased swelling and pain throughout the left lower extremity.  Patient has a history of total knee arthroplasty performed by Dr. Gross on 3/13/2024.  Patient states that over the last couple of weeks he has noticed increased swelling to the knee and extending into the calf and ankle.  He states there is some tenderness at the knee but his main issue is swelling today.  Patient denies any systemic symptoms of infection such as fever or chills.  He denies eating any erythema or warmth around the knee joint.  Patient denies any changes concerning for infection around the incisional scar.    Patient returns with right knee pain following a TKA DOS 3/14/24. Patient states since surgery he has always had pain and swelling. States more recently pain has been more severe, especially when using leg. Patient describes pain from the knee radiating down. Rates it today a 0/10.        Medical History  Patient's medications, allergies, past medical, surgical, social and family histories were reviewed and updated as appropriate.      Review of Systems   Constitutional:  Negative for fever.   HENT:  Negative for facial swelling and rhinorrhea.    Respiratory:  Negative for cough and shortness of breath.    Cardiovascular:  Negative for chest pain.   Gastrointestinal:  Negative for nausea.   Musculoskeletal:  Positive for arthralgias, gait problem and joint swelling. Negative for back pain, myalgias, neck pain and neck stiffness.   Skin:  Negative for pallor and rash.   Neurological:  Negative for facial asymmetry and speech difficulty.   Psychiatric/Behavioral:  Negative for agitation and confusion.                                                Examination:  General Exam:  Vitals: Pulse 64   Ht 1.772 m (5'

## 2025-02-25 LAB
MICROORGANISM SPEC CULT: NORMAL
MICROORGANISM/AGENT SPEC: NORMAL
SPECIMEN DESCRIPTION: NORMAL

## 2025-02-26 ENCOUNTER — OFFICE VISIT (OUTPATIENT)
Dept: BARIATRICS/WEIGHT MGMT | Age: 65
End: 2025-02-26

## 2025-02-26 VITALS — WEIGHT: 265.5 LBS | BODY MASS INDEX: 38.01 KG/M2 | HEIGHT: 70 IN

## 2025-02-26 DIAGNOSIS — E66.9 OBESITY (BMI 30-39.9): Primary | ICD-10-CM
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
## 2025-02-27 ENCOUNTER — HOSPITAL ENCOUNTER (OUTPATIENT)
Dept: ULTRASOUND IMAGING | Age: 65
Discharge: HOME | End: 2025-02-27
Payer: COMMERCIAL

## 2025-02-27 ENCOUNTER — TELEPHONE (OUTPATIENT)
Dept: PULMONOLOGY | Age: 65
End: 2025-02-27

## 2025-02-27 DIAGNOSIS — R60.9 EDEMA, UNSPECIFIED TYPE: ICD-10-CM

## 2025-02-27 PROCEDURE — 93971 EXTREMITY STUDY: CPT

## 2025-02-27 NOTE — TELEPHONE ENCOUNTER
Rec'd call from Maryana at the patient's ortho office. She states that the patient had surgery a year ago but returned to the office with some abnormal swelling. The office ordered an ultrasound for the patient which did show that the patient has a DVT. They are wondering if since you did a bronch on him in January you would be willing to order the blood thinner for the patient as they're ortho and they don't order meds for that long term. Maryana states that they will get him started on the blood thinner but they will not write for it long term. Please advise.

## 2025-02-28 ENCOUNTER — TELEPHONE (OUTPATIENT)
Age: 65
End: 2025-02-28

## 2025-02-28 ENCOUNTER — TELEPHONE (OUTPATIENT)
Dept: ORTHOPEDIC SURGERY | Age: 65
End: 2025-02-28

## 2025-02-28 DIAGNOSIS — I82.412 ACUTE DEEP VEIN THROMBOSIS (DVT) OF FEMORAL VEIN OF LEFT LOWER EXTREMITY (HCC): Primary | ICD-10-CM

## 2025-02-28 RX ORDER — WARFARIN SODIUM 2.5 MG/1
TABLET ORAL
Qty: 180 TABLET | Refills: 3 | Status: SHIPPED | OUTPATIENT
Start: 2025-02-28

## 2025-02-28 RX ORDER — WARFARIN SODIUM 2.5 MG/1
TABLET ORAL
Qty: 30 TABLET | Refills: 3 | Status: SHIPPED | OUTPATIENT
Start: 2025-02-28 | End: 2025-02-28 | Stop reason: SDUPTHER

## 2025-02-28 NOTE — TELEPHONE ENCOUNTER
[facility] was informed.  Patient has signed up for the xeralto cares by pharmacy?  Called [facility] and left message to call back if they cannot get xeralto so we can schedule INR weekly.

## 2025-02-28 NOTE — TELEPHONE ENCOUNTER
Maryana with [facility] called stating pt was evaluated in their office 02/20 and they ordered an ultrasound due to a suspected DVT. Pt got US done 02/27 and DVT was confirmed. Prescribed Xarelto starter pack but pt insurance will not cover this medication. Ortho requesting PCP to take over care for this condition as they do not feel comfortable finding alternative medication for patient.     Please advise    Contact pt with response as well as Ortho and ask for Maryana    Ortho - [phone]

## 2025-02-28 NOTE — TELEPHONE ENCOUNTER
Patient is to take the starter pack for 30 days and then start coumadin if unable to afford xeralto.  He will then need monthly INR checks.

## 2025-02-28 NOTE — TELEPHONE ENCOUNTER
Pt wife called in stating that  was prescribed Xarelto. when they went to  med they were told that the insurance wouldn't cover the med. They can not afford the medication out of pocket. Wife was wondering if there is a generic med that can be given instead.     Return call was requested as well.

## 2025-03-06 ENCOUNTER — OFFICE VISIT (OUTPATIENT)
Dept: ORTHOPEDIC SURGERY | Age: 65
End: 2025-03-06
Payer: COMMERCIAL

## 2025-03-06 VITALS — OXYGEN SATURATION: 97 % | BODY MASS INDEX: 39.21 KG/M2 | HEIGHT: 69 IN | HEART RATE: 67 BPM

## 2025-03-06 DIAGNOSIS — I82.4Z2 ACUTE DEEP VEIN THROMBOSIS (DVT) OF DISTAL END OF LEFT LOWER EXTREMITY (HCC): ICD-10-CM

## 2025-03-06 DIAGNOSIS — Z96.652 STATUS POST LEFT KNEE REPLACEMENT: Primary | ICD-10-CM

## 2025-03-06 PROCEDURE — 99213 OFFICE O/P EST LOW 20 MIN: CPT | Performed by: ORTHOPAEDIC SURGERY

## 2025-03-07 ENCOUNTER — HOSPITAL ENCOUNTER (OUTPATIENT)
Age: 65
Discharge: HOME | End: 2025-03-07
Payer: COMMERCIAL

## 2025-03-07 DIAGNOSIS — R79.89 ELEVATED FERRITIN: ICD-10-CM

## 2025-03-07 DIAGNOSIS — D69.6 THROMBOCYTOPENIA: ICD-10-CM

## 2025-03-07 DIAGNOSIS — I82.412 ACUTE DEEP VEIN THROMBOSIS (DVT) OF FEMORAL VEIN OF LEFT LOWER EXTREMITY (HCC): ICD-10-CM

## 2025-03-07 LAB
ALBUMIN SERPL-MCNC: 4.4 G/DL (ref 3.4–5)
ALBUMIN/GLOB SERPL: 1.6 {RATIO} (ref 1.1–2.2)
ALP SERPL-CCNC: 78 U/L (ref 40–129)
ALT SERPL-CCNC: 61 U/L (ref 10–40)
ANION GAP SERPL CALCULATED.3IONS-SCNC: 12 MMOL/L (ref 4–16)
AST SERPL-CCNC: 48 U/L (ref 15–37)
BASOPHILS # BLD: 0.03 K/UL
BASOPHILS NFR BLD: 1 % (ref 0–1)
BILIRUB SERPL-MCNC: 3.3 MG/DL (ref 0–1)
BUN SERPL-MCNC: 16 MG/DL (ref 6–23)
CALCIUM SERPL-MCNC: 9.6 MG/DL (ref 8.3–10.6)
CHLORIDE SERPL-SCNC: 105 MMOL/L (ref 99–110)
CO2 SERPL-SCNC: 22 MMOL/L (ref 21–32)
CREAT SERPL-MCNC: 1 MG/DL (ref 0.9–1.3)
EOSINOPHIL # BLD: 0.12 K/UL
EOSINOPHILS RELATIVE PERCENT: 3 % (ref 0–3)
ERYTHROCYTE [DISTWIDTH] IN BLOOD BY AUTOMATED COUNT: 13.4 % (ref 11.7–14.9)
ERYTHROCYTE [SEDIMENTATION RATE] IN BLOOD BY WESTERGREN METHOD: 3 MM/HR (ref 0–20)
FERRITIN SERPL-MCNC: 174 NG/ML (ref 30–400)
FOLATE SERPL-MCNC: 12.7 NG/ML (ref 4.8–24.2)
GFR, ESTIMATED: 84 ML/MIN/1.73M2
GLUCOSE SERPL-MCNC: 95 MG/DL (ref 74–99)
HCT VFR BLD AUTO: 43.9 % (ref 42–52)
HGB BLD-MCNC: 15.3 G/DL (ref 13.5–18)
IMM GRANULOCYTES # BLD AUTO: 0.01 K/UL
IMM GRANULOCYTES NFR BLD: 0 %
INR PPP: 2.1
IRON SATN MFR SERPL: 34 % (ref 15–50)
IRON SERPL-MCNC: 123 UG/DL (ref 59–158)
LDH SERPL-CCNC: 283 U/L (ref 100–190)
LYMPHOCYTES NFR BLD: 1.42 K/UL
LYMPHOCYTES RELATIVE PERCENT: 34 % (ref 24–44)
MCH RBC QN AUTO: 31.9 PG (ref 27–31)
MCHC RBC AUTO-ENTMCNC: 34.9 G/DL (ref 32–36)
MCV RBC AUTO: 91.5 FL (ref 78–100)
MONOCYTES NFR BLD: 0.28 K/UL
MONOCYTES NFR BLD: 7 % (ref 0–4)
NEUTROPHILS NFR BLD: 56 % (ref 36–66)
NEUTS SEG NFR BLD: 2.34 K/UL
PLATELET # BLD AUTO: 134 K/UL (ref 140–440)
PMV BLD AUTO: 10.6 FL (ref 7.5–11.1)
POTASSIUM SERPL-SCNC: 4.4 MMOL/L (ref 3.5–5.1)
PROT SERPL-MCNC: 7.2 G/DL (ref 6.4–8.2)
PROTHROMBIN TIME: 23.9 SEC (ref 11.7–14.5)
RBC # BLD AUTO: 4.8 M/UL (ref 4.6–6.2)
RETICS # AUTO: 0.12 M/UL
RETICS/RBC NFR AUTO: 2.5 % (ref 0.2–2)
SODIUM SERPL-SCNC: 139 MMOL/L (ref 135–145)
TIBC SERPL-MCNC: 360 UG/DL (ref 260–445)
UNSATURATED IRON BINDING CAPACITY: 237 UG/DL (ref 110–370)
VIT B12 SERPL-MCNC: 1550 PG/ML (ref 211–911)
WBC OTHER # BLD: 4.2 K/UL (ref 4–10.5)

## 2025-03-07 PROCEDURE — 85610 PROTHROMBIN TIME: CPT

## 2025-03-07 PROCEDURE — 85045 AUTOMATED RETICULOCYTE COUNT: CPT

## 2025-03-07 PROCEDURE — 36415 COLL VENOUS BLD VENIPUNCTURE: CPT

## 2025-03-07 PROCEDURE — 80053 COMPREHEN METABOLIC PANEL: CPT

## 2025-03-07 PROCEDURE — 82607 VITAMIN B-12: CPT

## 2025-03-07 PROCEDURE — 83540 ASSAY OF IRON: CPT

## 2025-03-07 PROCEDURE — 85025 COMPLETE CBC W/AUTO DIFF WBC: CPT

## 2025-03-07 PROCEDURE — 82728 ASSAY OF FERRITIN: CPT

## 2025-03-07 PROCEDURE — 85652 RBC SED RATE AUTOMATED: CPT

## 2025-03-07 PROCEDURE — 83550 IRON BINDING TEST: CPT

## 2025-03-07 PROCEDURE — 82746 ASSAY OF FOLIC ACID SERUM: CPT

## 2025-03-07 PROCEDURE — 83615 LACTATE (LD) (LDH) ENZYME: CPT

## 2025-03-10 ASSESSMENT — ENCOUNTER SYMPTOMS
SHORTNESS OF BREATH: 0
VOMITING: 0
EYE REDNESS: 0
WHEEZING: 0
EYE PAIN: 0
CHEST TIGHTNESS: 0
COLOR CHANGE: 0

## 2025-03-10 NOTE — PROGRESS NOTES
3/6/2025   Chief Complaint   Patient presents with    Knee Pain     Left         History of Present Illness:                             Negrito Nicole is a 64 y.o. male presents today for evaluation of his left knee pain and lower extremity swelling.     Patient states that over the last couple of weeks he has noticed increased swelling to the knee and extending into the calf and ankle.  This is developed after he had been traveling on a plane.    He states there is some tenderness at the knee but his main issue is swelling today.  Patient denies any systemic symptoms of infection such as fever or chills.  He denies eating any erythema or warmth around the knee joint.  Patient denies any changes concerning for infection around the incisional scar.     Patient returns with right knee pain following a TKA DOS 3/14/24. Patient states since surgery he has always had pain and swelling. States more recently pain has been more severe, especially when using leg. Patient describes pain from the knee radiating down. Rates it today a 0/10.        He recently had Doppler ultrasound performed and was found to be positive for DVT.  He has started his Xarelto.    He has a history of hemochromatosis and does see a hematologist regularly for this problem.  He has an upcoming appointment and will be able to discuss his new diagnosis of DVT and have further management and workup through his hematologist in the next week or 2.    IMPRESSION: Nonocclusive left femoral and gastrocnemius DVTs        Medical History  Patient's medications, allergies, past medical, surgical, social and family histories were reviewed and updated as appropriate.      Review of Systems   Constitutional:  Negative for chills and fever.   HENT:  Negative for congestion and sneezing.    Eyes:  Negative for pain and redness.   Respiratory:  Negative for chest tightness, shortness of breath and wheezing.    Cardiovascular:  Positive for leg swelling. Negative

## 2025-03-13 NOTE — TELEPHONE ENCOUNTER
Patent responded that Pulmonology will get the request from  per his MyChart chart GI consulted advised MRCP will admit   surgery consulted

## 2025-03-16 ENCOUNTER — RESULTS FOLLOW-UP (OUTPATIENT)
Age: 65
End: 2025-03-16

## 2025-03-20 ENCOUNTER — OFFICE VISIT (OUTPATIENT)
Dept: BARIATRICS/WEIGHT MGMT | Age: 65
End: 2025-03-20
Payer: COMMERCIAL

## 2025-03-20 VITALS
BODY MASS INDEX: 36.86 KG/M2 | WEIGHT: 257.5 LBS | HEIGHT: 70 IN | DIASTOLIC BLOOD PRESSURE: 86 MMHG | HEART RATE: 67 BPM | SYSTOLIC BLOOD PRESSURE: 126 MMHG | OXYGEN SATURATION: 96 %

## 2025-03-20 DIAGNOSIS — Z79.899 MEDICATION MANAGEMENT: Primary | ICD-10-CM

## 2025-03-20 DIAGNOSIS — E66.9 OBESITY (BMI 30-39.9): ICD-10-CM

## 2025-03-20 PROCEDURE — 99214 OFFICE O/P EST MOD 30 MIN: CPT | Performed by: NURSE PRACTITIONER

## 2025-03-20 RX ORDER — PHENTERMINE HYDROCHLORIDE 37.5 MG/1
37.5 TABLET ORAL
Qty: 30 TABLET | Refills: 0 | Status: SHIPPED | OUTPATIENT
Start: 2025-03-20 | End: 2025-04-19

## 2025-03-20 NOTE — PROGRESS NOTES
Chief Complaint   Patient presents with    Weight Management     medication wm adipex #2 visit             SUBJECTIVE:    HPI: Patient is here with complaints of: Monthly Adipex visit.    Obesity with a BMI of Body mass index is 37.21 kg/m²..    Current weight: 257 pounds    Weight change since last visit: -19.6 pounds (if pt failed to lose weight, cannot remain on medication).    Month 2 of being on Adipex.     Any new absolute contraindications (glaucoma, drug abuse, CAD, uncontrolled HTN, arrhythmias, stroke, CHF, hyperthyroidism, MAOI use, pregnant or breastfeeding) to being on medication: DENIES     Pt complains of the following side effects: DENIES    Current dietary measures: some calorie tracking; focusing on protein foods; water intake improved; overall caloric low certain days    Current exercise measures: walking and more yoga     I have reviewed the patient's(pertinent information to this visit) medical history, family history(scanned in  the [employer] under \"patient questioner\"), social history and review of systems with the patient today in the office.          Past Surgical History:   Procedure Laterality Date    BRONCHOSCOPY N/A 1/8/2025    BRONCHOSCOPY ALVEOLAR LAVAGE performed by Bucky Hernandez MD at [facility] ENDOSCOPY    COLONOSCOPY  2011    Dr. Marco Yuen    COLONOSCOPY  09/05/2018    Dr. Romero    IR BIOPSY LIVER PERCUTANEOUS  7/6/2022    IR BIOPSY LIVER PERCUTANEOUS 7/6/2022 [facility] SPECIAL PROCEDURES    LIPOMA RESECTION Left 2011    Dr. Galvin    THROAT SURGERY  2010    for STEVEN [facility] [place]    TOTAL KNEE ARTHROPLASTY Left 3/13/2024    LEFT KNEE TOTAL ARTHROPLASTY ROBOTIC performed by Thor Gross MD at [facility] OR    UMBILICAL HERNIA REPAIR  04/18/2018       Past Medical History:   Diagnosis Date    Asthma     Bruised ribs 1999    running fall    Chronic shoulder pain     Contusion of ribs 2011    \"dislocated or bruised ribs center of rt back.    Depression 2011    Heel pain, bilateral

## 2025-03-30 NOTE — PROGRESS NOTES
Patient Name:  Negrito Nicole  Patient :  1960  Patient MRN:  [id number]     Primary Oncologist: Rito Tejeda MD  Referring Provider: Roberto Gonzalez MD     Date of Service: 2025     Chief Complaint:    Chief Complaint   Patient presents with    Follow-up     Patient Active Problem List:     Obesity, Class II, BMI 35-39.9, with comorbidity (HCC)     Chronic right shoulder pain     STEVEN on CPAP     Total bilirubin, elevated     Fatigue     Major depressive disorder, recurrent episode, mild (HCC)     Erectile dysfunction     Colon polyps     RAD (reactive airway disease)     Impaired fasting glucose     Hepatic steatosis- seen on CT abdomen     Diverticulosis of large intestine- seen on CT abdomen     Hypersomnia     Primary osteoarthritis of right knee     Cough variant asthma    HPI:   Negrito Nicole is a 63-year-old very pleasant gentleman with medical history history significant for hyperlipidemia, depression, obstructive sleep apnea, obesity, fatty liver and osteoarthritis, initially referred to me on 2022 for evaluation of elevated serum ferritin level with high transferrin saturation and mild thrombocytopenia.    He has been having elevated liver enzymes and has been followed by gastroenterology. He recently established care as a new patient with Dr. Barron.     Viral hepatitis panels done on 10/18/21 and 3/3/22 were negative. JENNIFER, F-actin IgG, tissue transglut Ab, transglutaminase IgA were negative. A1AT and ceruloplasmin were within normal range.     CT abdomen/pelvis in 2018 showed fatty liver. US abdomen on 22 showed hepatitis steatosis.  Fibroscan done on 22 showed advanced fibrosis and possibly cirrhosis.     Iron panel on 10/18/21, 3/3/22 and 22 showed persistently elevated serum ferritin level and transferrin saturation.     He also has mild thrombocytopenia since . He doesn't have anemia or neutropenia.     He stopped drinking alcohol since 3/2022. Before that, he

## 2025-04-01 ENCOUNTER — OFFICE VISIT (OUTPATIENT)
Dept: ONCOLOGY | Age: 65
End: 2025-04-01
Payer: COMMERCIAL

## 2025-04-01 ENCOUNTER — HOSPITAL ENCOUNTER (OUTPATIENT)
Dept: INFUSION THERAPY | Age: 65
Discharge: HOME | End: 2025-04-01

## 2025-04-01 VITALS
OXYGEN SATURATION: 97 % | SYSTOLIC BLOOD PRESSURE: 142 MMHG | WEIGHT: 255.8 LBS | TEMPERATURE: 98.2 F | HEART RATE: 84 BPM | BODY MASS INDEX: 36.62 KG/M2 | DIASTOLIC BLOOD PRESSURE: 87 MMHG | HEIGHT: 70 IN

## 2025-04-01 DIAGNOSIS — D69.6 THROMBOCYTOPENIA: ICD-10-CM

## 2025-04-01 DIAGNOSIS — R79.89 ELEVATED FERRITIN: Primary | ICD-10-CM

## 2025-04-01 PROCEDURE — 99214 OFFICE O/P EST MOD 30 MIN: CPT | Performed by: INTERNAL MEDICINE

## 2025-04-01 ASSESSMENT — PATIENT HEALTH QUESTIONNAIRE - PHQ9
SUM OF ALL RESPONSES TO PHQ QUESTIONS 1-9: 0
2. FEELING DOWN, DEPRESSED OR HOPELESS: NOT AT ALL
1. LITTLE INTEREST OR PLEASURE IN DOING THINGS: NOT AT ALL

## 2025-04-01 NOTE — PROGRESS NOTES
MA Rooming Questions  Patient: Negrito Nicole  MRN: [id number]    Date: 4/1/2025        1. Do you have any new issues?   Yes      2. Do you need any refills on medications?    no    3. Have you had any imaging done since your last visit?   no    4. Have you been hospitalized or seen in the emergency room since your last visit here?   no    5. Did the patient have a depression screening completed today? Yes    Score-0    PHQ-9 Given to (if applicable):               PHQ-9 Score (if applicable):                     [] Positive     []  Negative              Does question #9 need addressed (if applicable)                     [] Yes    []  No               Fiorella Corona MA

## 2025-04-21 ENCOUNTER — OFFICE VISIT (OUTPATIENT)
Dept: BARIATRICS/WEIGHT MGMT | Age: 65
End: 2025-04-21
Payer: COMMERCIAL

## 2025-04-21 VITALS
HEIGHT: 70 IN | SYSTOLIC BLOOD PRESSURE: 120 MMHG | HEART RATE: 68 BPM | BODY MASS INDEX: 34.73 KG/M2 | DIASTOLIC BLOOD PRESSURE: 84 MMHG | WEIGHT: 242.6 LBS | OXYGEN SATURATION: 97 %

## 2025-04-21 DIAGNOSIS — Z79.899 MEDICATION MANAGEMENT: Primary | ICD-10-CM

## 2025-04-21 DIAGNOSIS — E66.9 OBESITY (BMI 30-39.9): ICD-10-CM

## 2025-04-21 PROCEDURE — 99214 OFFICE O/P EST MOD 30 MIN: CPT | Performed by: NURSE PRACTITIONER

## 2025-04-21 RX ORDER — PHENTERMINE HYDROCHLORIDE 37.5 MG/1
37.5 TABLET ORAL
Qty: 30 TABLET | Refills: 0 | Status: SHIPPED | OUTPATIENT
Start: 2025-04-21 | End: 2025-05-21

## 2025-04-21 ASSESSMENT — ENCOUNTER SYMPTOMS: CONSTIPATION: 1

## 2025-04-21 NOTE — PROGRESS NOTES
No    Sexual activity: Not Currently     Partners: Female   Other Topics Concern    Not on file   Social History Narrative    Lives with wife, twin sons         Social Drivers of Health     Financial Resource Strain: Low Risk  (6/24/2024)    Overall Financial Resource Strain (CARDIA)     Difficulty of Paying Living Expenses: Not hard at all   Food Insecurity: No Food Insecurity (6/24/2024)    Hunger Vital Sign     Worried About Running Out of Food in the Last Year: Never true     Ran Out of Food in the Last Year: Never true   Transportation Needs: Unknown (6/24/2024)    PRAPARE - Transportation     Lack of Transportation (Medical): Not on file     Lack of Transportation (Non-Medical): No   Physical Activity: Insufficiently Active (2/21/2024)    Exercise Vital Sign     Days of Exercise per Week: 2 days     Minutes of Exercise per Session: 50 min   Stress: Not on File (5/25/2023)    Received from [facility]    Stress     Stress: 0   Social Connections: Not on File (5/25/2023)    Received from [facility]    Social Connections     Social Connections and Isolation: 0   Intimate Partner Violence: Not on file   Housing Stability: Low Risk  (6/24/2024)    Housing Stability Vital Sign     Unable to Pay for Housing in the Last Year: No     Number of Places Lived in the Last Year: 1     Unstable Housing in the Last Year: No       Current Outpatient Medications   Medication Sig Dispense Refill    phentermine (ADIPEX-P) 37.5 MG tablet Take 1 tablet by mouth every morning (before breakfast) for 30 days. BMI 44. Max Daily Amount: 37.5 mg 30 tablet 0    warfarin (COUMADIN) 2.5 MG tablet Start coumadin 2.5mg 3x weekly, 5mg 4x weekly based on body weight. Recheck INR in 5 days. 180 tablet 3    pantoprazole sodium (PROTONIX) 40 MG PACK packet Take 1 packet by mouth every morning (before breakfast) 30 each 3    benzonatate (TESSALON) 100 MG capsule Take 1 capsule by mouth 3 times daily as needed for Cough

## 2025-04-22 NOTE — ASSESSMENT & PLAN NOTE
Advised to loose weight with diet and exercise
He has severe STEVEN on the repeat HST  Advised to be compliant with the CPAP  Loose weight
He has severe STEVEN on the repeat HST  Advised to be compliant with the CPAP  Loose weight
Home

## 2025-05-15 ENCOUNTER — OFFICE VISIT (OUTPATIENT)
Dept: BARIATRICS/WEIGHT MGMT | Age: 65
End: 2025-05-15
Payer: COMMERCIAL

## 2025-05-15 VITALS
BODY MASS INDEX: 34.37 KG/M2 | HEART RATE: 80 BPM | DIASTOLIC BLOOD PRESSURE: 86 MMHG | SYSTOLIC BLOOD PRESSURE: 124 MMHG | WEIGHT: 240.1 LBS | HEIGHT: 70 IN | OXYGEN SATURATION: 97 %

## 2025-05-15 DIAGNOSIS — E66.01 MORBID OBESITY DUE TO EXCESS CALORIES (HCC): Primary | ICD-10-CM

## 2025-05-15 DIAGNOSIS — Z79.899 MEDICATION MANAGEMENT: ICD-10-CM

## 2025-05-15 PROCEDURE — 99214 OFFICE O/P EST MOD 30 MIN: CPT | Performed by: SURGERY

## 2025-05-15 RX ORDER — PHENTERMINE HYDROCHLORIDE 37.5 MG/1
37.5 TABLET ORAL
Qty: 30 TABLET | Refills: 0 | Status: SHIPPED | OUTPATIENT
Start: 2025-05-15 | End: 2025-06-14

## 2025-05-15 NOTE — PROGRESS NOTES
Chief Complaint   Patient presents with    Weight Management     F/U Medication WM Visit - Adipex #4  No Bar Cov         SUBJECTIVE:    HPI: Patient is here with complaints of: monthly Adipex visit.    Obesity with a BMI of Body mass index is 34.7 kg/m²..    Current weight: 240.1    Weight change since last visit: -2.5 (if pt failed to lose weight, cannot remain on medication).    Month 4 of being on Adipex.     Any new absolute contraindications (glaucoma, drug abuse, CAD, uncontrolled HTN, arrhythmias, stroke, CHF, hyperthyroidism, MAOI use, pregnant or breastfeeding) to being on medication: denies    Pt complains of the following side effects: denies    Current dietary measures: goal was less than 1700 [address] / d but this past month reports he has been eating out more.    Current exercise measures: was doing yoga but reports less now    I have reviewed the patient's(pertinent information to this visit) medical history, family history(scanned in  the Med[employer] under \"patient questioner\"), social history and review of systems with the patient today in the office.          Past Surgical History:   Procedure Laterality Date    BRONCHOSCOPY N/A 1/8/2025    BRONCHOSCOPY ALVEOLAR LAVAGE performed by Bucky Hernandez MD at [facility] ENDOSCOPY    COLONOSCOPY  2011    Dr. Marco Yuen    COLONOSCOPY  09/05/2018    Dr. Romero    IR BIOPSY LIVER PERCUTANEOUS  7/6/2022    IR BIOPSY LIVER PERCUTANEOUS 7/6/2022 [facility] SPECIAL PROCEDURES    LIPOMA RESECTION Left 2011    Dr. Galvin    THROAT SURGERY  2010    for STEVEN [facility] [place]    TOTAL KNEE ARTHROPLASTY Left 3/13/2024    LEFT KNEE TOTAL ARTHROPLASTY ROBOTIC performed by Thor Gross MD at [facility] OR    UMBILICAL HERNIA REPAIR  04/18/2018       Past Medical History:   Diagnosis Date    Asthma     Bruised ribs 1999    running fall    Chronic shoulder pain     Contusion of ribs 2011    \"dislocated or bruised ribs center of rt back.    Depression 2011    Heel pain, bilateral

## 2025-06-09 ENCOUNTER — OFFICE VISIT (OUTPATIENT)
Dept: SURGERY | Age: 65
End: 2025-06-09
Payer: COMMERCIAL

## 2025-06-09 VITALS
SYSTOLIC BLOOD PRESSURE: 140 MMHG | HEART RATE: 78 BPM | OXYGEN SATURATION: 97 % | DIASTOLIC BLOOD PRESSURE: 88 MMHG | BODY MASS INDEX: 33.92 KG/M2 | HEIGHT: 70 IN | WEIGHT: 236.9 LBS

## 2025-06-09 DIAGNOSIS — Z79.899 MEDICATION MANAGEMENT: ICD-10-CM

## 2025-06-09 DIAGNOSIS — E66.01 MORBID OBESITY DUE TO EXCESS CALORIES (HCC): ICD-10-CM

## 2025-06-09 PROCEDURE — 99214 OFFICE O/P EST MOD 30 MIN: CPT | Performed by: NURSE PRACTITIONER

## 2025-06-09 RX ORDER — PHENTERMINE HYDROCHLORIDE 37.5 MG/1
37.5 TABLET ORAL
Qty: 30 TABLET | Refills: 0 | Status: SHIPPED | OUTPATIENT
Start: 2025-06-09 | End: 2025-07-09

## 2025-06-09 NOTE — PROGRESS NOTES
Chief Complaint   Patient presents with    Weight Management     F/U Medication WM Visit - Adipex #5  No Bar Cov         SUBJECTIVE:    HPI: Patient is here with complaints of: monthly Adipex visit.    Obesity with a BMI of Body mass index is 34.24 kg/m²..    Current weight: 236.9    Weight change since last visit: -3.2 (if pt failed to lose weight, cannot remain on medication).    Month 5 of being on Adipex.     Any new absolute contraindications (glaucoma, drug abuse, CAD, uncontrolled HTN, arrhythmias, stroke, CHF, hyperthyroidism, MAOI use, pregnant or breastfeeding) to being on medication: denies    Pt complains of the following side effects: denies    Current dietary measures: goal was less than 1700 jose miguel / d but this past month reports he has been eating out more.    Current exercise measures: was doing yoga but reports less now however he plans to get back into it with his son    I have reviewed the patient's(pertinent information to this visit) medical history, family history(scanned in  the Med[employer] under \"patient questioner\"), social history and review of systems with the patient today in the office.          Past Surgical History:   Procedure Laterality Date    BRONCHOSCOPY N/A 1/8/2025    BRONCHOSCOPY ALVEOLAR LAVAGE performed by Bucky Hernandez MD at [facility] ENDOSCOPY    COLONOSCOPY  2011    Dr. Marco Yuen    COLONOSCOPY  09/05/2018    Dr. Romero    IR BIOPSY LIVER PERCUTANEOUS  7/6/2022    IR BIOPSY LIVER PERCUTANEOUS 7/6/2022 [facility] SPECIAL PROCEDURES    LIPOMA RESECTION Left 2011    Dr. Galvin    THROAT SURGERY  2010    for STEVEN [facility] [place]    TOTAL KNEE ARTHROPLASTY Left 3/13/2024    LEFT KNEE TOTAL ARTHROPLASTY ROBOTIC performed by Thor Gross MD at [facility] OR    UMBILICAL HERNIA REPAIR  04/18/2018       Past Medical History:   Diagnosis Date    Asthma     Bruised ribs 1999    running fall    Chronic shoulder pain     Contusion of ribs 2011    \"dislocated or bruised ribs center of rt

## 2025-06-24 ENCOUNTER — PATIENT MESSAGE (OUTPATIENT)
Age: 65
End: 2025-06-24

## 2025-06-24 DIAGNOSIS — R39.9 LOWER URINARY TRACT SYMPTOMS (LUTS): ICD-10-CM

## 2025-06-24 DIAGNOSIS — R97.20 ELEVATED PSA: ICD-10-CM

## 2025-06-24 DIAGNOSIS — I82.412 ACUTE DEEP VEIN THROMBOSIS (DVT) OF FEMORAL VEIN OF LEFT LOWER EXTREMITY (HCC): ICD-10-CM

## 2025-06-26 RX ORDER — FINASTERIDE 5 MG/1
5 TABLET, FILM COATED ORAL DAILY
Qty: 60 TABLET | Refills: 0 | Status: SHIPPED | OUTPATIENT
Start: 2025-06-26

## 2025-06-26 RX ORDER — WARFARIN SODIUM 2.5 MG/1
TABLET ORAL
Qty: 180 TABLET | Refills: 3 | Status: SHIPPED | OUTPATIENT
Start: 2025-06-26

## 2025-06-26 RX ORDER — TAMSULOSIN HYDROCHLORIDE 0.4 MG/1
0.4 CAPSULE ORAL DAILY
Qty: 60 CAPSULE | Refills: 0 | Status: SHIPPED | OUTPATIENT
Start: 2025-06-26

## 2025-06-26 NOTE — TELEPHONE ENCOUNTER
Message from patient - \"If you could go ahead and cancel these from the medicine shop and send them to [facility] in [place], that would be great.      FINASTERIDE 5 MG TABS  TAMSULOSIN HCL 0.4 MG CAP  WARFARIN SODIUM 2.5 MG TAB\"

## 2025-07-07 ENCOUNTER — OFFICE VISIT (OUTPATIENT)
Dept: SURGERY | Age: 65
End: 2025-07-07
Payer: MEDICARE

## 2025-07-07 VITALS
HEIGHT: 70 IN | HEART RATE: 66 BPM | SYSTOLIC BLOOD PRESSURE: 152 MMHG | BODY MASS INDEX: 33.87 KG/M2 | WEIGHT: 236.6 LBS | OXYGEN SATURATION: 95 % | DIASTOLIC BLOOD PRESSURE: 90 MMHG | RESPIRATION RATE: 18 BRPM

## 2025-07-07 DIAGNOSIS — E66.9 OBESITY (BMI 30-39.9): Primary | ICD-10-CM

## 2025-07-07 DIAGNOSIS — Z79.899 MEDICATION MANAGEMENT: ICD-10-CM

## 2025-07-07 DIAGNOSIS — E66.01 MORBID OBESITY DUE TO EXCESS CALORIES (HCC): ICD-10-CM

## 2025-07-07 PROCEDURE — G8417 CALC BMI ABV UP PARAM F/U: HCPCS | Performed by: NURSE PRACTITIONER

## 2025-07-07 PROCEDURE — 99214 OFFICE O/P EST MOD 30 MIN: CPT | Performed by: NURSE PRACTITIONER

## 2025-07-07 PROCEDURE — 3017F COLORECTAL CA SCREEN DOC REV: CPT | Performed by: NURSE PRACTITIONER

## 2025-07-07 PROCEDURE — 1036F TOBACCO NON-USER: CPT | Performed by: NURSE PRACTITIONER

## 2025-07-07 PROCEDURE — 1123F ACP DISCUSS/DSCN MKR DOCD: CPT | Performed by: NURSE PRACTITIONER

## 2025-07-07 PROCEDURE — G8427 DOCREV CUR MEDS BY ELIG CLIN: HCPCS | Performed by: NURSE PRACTITIONER

## 2025-07-07 RX ORDER — PHENTERMINE HYDROCHLORIDE 37.5 MG/1
37.5 TABLET ORAL
Qty: 30 TABLET | Refills: 0 | Status: SHIPPED | OUTPATIENT
Start: 2025-07-07 | End: 2025-08-06

## 2025-07-07 NOTE — PROGRESS NOTES
Chief Complaint   Patient presents with    Weight Loss    Weight Management      F/U Medication WM Visit - Adipex # 6 No BAr Cov             SUBJECTIVE:    HPI: Patient is here with complaints of: monthly Adipex visit.    Obesity with a BMI of Body mass index is 34.19 kg/m²..    Current weight: 236.6    Weight change since last visit: -0.3 (if pt failed to lose weight, cannot remain on medication).    Month 6 of being on Adipex.     Any new absolute contraindications (glaucoma, drug abuse, CAD, uncontrolled HTN, arrhythmias, stroke, CHF, hyperthyroidism, MAOI use, pregnant or breastfeeding) to being on medication: denies    Pt complains of the following side effects: denies    Current dietary measures: goal was less than 1700 jose miguel / d states has been on multiple vacations lately    Current exercise measures: he has been working outside more.     I have reviewed the patient's(pertinent information to this visit) medical history, family history(scanned in  the Mediat[employer] under \"patient questioner\"), social history and review of systems with the patient today in the office.          Past Surgical History:   Procedure Laterality Date    BRONCHOSCOPY N/A 1/8/2025    BRONCHOSCOPY ALVEOLAR LAVAGE performed by Bucky Hernandez MD at [facility] ENDOSCOPY    COLONOSCOPY  2011    Dr. Marco Yuen    COLONOSCOPY  09/05/2018    Dr. Romero    IR BIOPSY LIVER PERCUTANEOUS  7/6/2022    IR BIOPSY LIVER PERCUTANEOUS 7/6/2022 [facility] SPECIAL PROCEDURES    LIPOMA RESECTION Left 2011    Dr. Galvin    THROAT SURGERY  2010    for STEVEN [facility] [place]    TOTAL KNEE ARTHROPLASTY Left 3/13/2024    LEFT KNEE TOTAL ARTHROPLASTY ROBOTIC performed by Thor Gross MD at [facility] OR    UMBILICAL HERNIA REPAIR  04/18/2018       Past Medical History:   Diagnosis Date    Asthma     Bruised ribs 1999    running fall    Chronic shoulder pain     Contusion of ribs 2011    \"dislocated or bruised ribs center of rt back.    Depression 2011    Heel pain,

## 2025-07-18 ENCOUNTER — OFFICE VISIT (OUTPATIENT)
Dept: PULMONOLOGY | Age: 65
End: 2025-07-18
Payer: MEDICARE

## 2025-07-18 VITALS
BODY MASS INDEX: 35.07 KG/M2 | WEIGHT: 236.8 LBS | OXYGEN SATURATION: 96 % | HEART RATE: 62 BPM | DIASTOLIC BLOOD PRESSURE: 80 MMHG | HEIGHT: 69 IN | SYSTOLIC BLOOD PRESSURE: 132 MMHG

## 2025-07-18 DIAGNOSIS — R05.3 CHRONIC COUGH: Primary | ICD-10-CM

## 2025-07-18 DIAGNOSIS — E66.9 OBESITY (BMI 30-39.9): ICD-10-CM

## 2025-07-18 DIAGNOSIS — G47.33 OSA (OBSTRUCTIVE SLEEP APNEA): ICD-10-CM

## 2025-07-18 DIAGNOSIS — G47.10 HYPERSOMNIA: ICD-10-CM

## 2025-07-18 PROCEDURE — G8427 DOCREV CUR MEDS BY ELIG CLIN: HCPCS | Performed by: INTERNAL MEDICINE

## 2025-07-18 PROCEDURE — 1123F ACP DISCUSS/DSCN MKR DOCD: CPT | Performed by: INTERNAL MEDICINE

## 2025-07-18 PROCEDURE — 99214 OFFICE O/P EST MOD 30 MIN: CPT | Performed by: INTERNAL MEDICINE

## 2025-07-18 PROCEDURE — G8417 CALC BMI ABV UP PARAM F/U: HCPCS | Performed by: INTERNAL MEDICINE

## 2025-07-18 PROCEDURE — 3017F COLORECTAL CA SCREEN DOC REV: CPT | Performed by: INTERNAL MEDICINE

## 2025-07-18 PROCEDURE — 1036F TOBACCO NON-USER: CPT | Performed by: INTERNAL MEDICINE

## 2025-07-18 ASSESSMENT — ENCOUNTER SYMPTOMS
EYE DISCHARGE: 0
EYE ITCHING: 0
BACK PAIN: 0
SHORTNESS OF BREATH: 0
ABDOMINAL DISTENTION: 0
ABDOMINAL PAIN: 0
COUGH: 0

## 2025-07-18 NOTE — PROGRESS NOTES
Negrito Nicole  1960  Referring Provider: José Olson, [facility] - General     Subjective:     Chief Complaint   Patient presents with    Follow-up     new CPAP order       HPI  Negrito is a 65 y.o. male has come back as a follow up. He has severe STEVEN on the Auto CPAP which he is using it most of the nights about 5 to 6 hours. He says that it is helping him.He had a ASV titration and required ASV but since it was costing him about $5000, he decided to hold off on it but using the Auto CPAP. He has a nasal mask. He has 40 lb weight loss. His 2 week download data showed a residual AHI is 1.5 and leak is 25.2 and his 95th percentile pressure is 7.2 cm h20.    His chronic cough has improved    Current Outpatient Medications   Medication Sig Dispense Refill    phentermine (ADIPEX-P) 37.5 MG tablet Take 1 tablet by mouth every morning (before breakfast) for 30 days. Max Daily Amount: 37.5 mg 30 tablet 0    tamsulosin (FLOMAX) 0.4 MG capsule Take 1 capsule by mouth daily 60 capsule 0    warfarin (COUMADIN) 2.5 MG tablet Start coumadin 2.5mg 3x weekly, 5mg 4x weekly based on body weight. Recheck INR in 5 days. 180 tablet 3    finasteride (PROSCAR) 5 MG tablet Take 1 tablet by mouth daily 60 tablet 0    benzonatate (TESSALON) 100 MG capsule Take 1 capsule by mouth 3 times daily as needed for Cough      fluticasone-salmeterol (ADVAIR) 250-50 MCG/ACT AEPB diskus inhaler INHALE 1 PUFF INTO THE LUNGS IN THE MORNING AND 1 PUFF IN THE EVENING. (Patient taking differently: Inhale 1 puff into the lungs every 12 hours as needed) 60 each 3    albuterol sulfate HFA (VENTOLIN HFA) 108 (90 Base) MCG/ACT inhaler Inhale 2 puffs into the lungs every 4 hours as needed for Wheezing or Shortness of Breath (cough) 18 g 0    vitamin D (ERGOCALCIFEROL) 1.25 MG (74560 UT) CAPS capsule Take 1 capsule by mouth once a week 12 capsule 3    topiramate (TOPAMAX) 50 MG tablet Take 1 tablet by mouth nightly 90 tablet 3    vitamin B-12

## 2025-07-31 ENCOUNTER — OFFICE VISIT (OUTPATIENT)
Dept: FAMILY MEDICINE CLINIC | Age: 65
End: 2025-07-31
Payer: MEDICARE

## 2025-07-31 VITALS
BODY MASS INDEX: 35.09 KG/M2 | HEART RATE: 63 BPM | TEMPERATURE: 97.7 F | SYSTOLIC BLOOD PRESSURE: 122 MMHG | DIASTOLIC BLOOD PRESSURE: 86 MMHG | WEIGHT: 237.6 LBS | OXYGEN SATURATION: 98 %

## 2025-07-31 DIAGNOSIS — J40 BRONCHITIS: Primary | ICD-10-CM

## 2025-07-31 DIAGNOSIS — J45.991 COUGH VARIANT ASTHMA: ICD-10-CM

## 2025-07-31 PROCEDURE — 3017F COLORECTAL CA SCREEN DOC REV: CPT | Performed by: PHYSICIAN ASSISTANT

## 2025-07-31 PROCEDURE — G8427 DOCREV CUR MEDS BY ELIG CLIN: HCPCS | Performed by: PHYSICIAN ASSISTANT

## 2025-07-31 PROCEDURE — 1123F ACP DISCUSS/DSCN MKR DOCD: CPT | Performed by: PHYSICIAN ASSISTANT

## 2025-07-31 PROCEDURE — G8417 CALC BMI ABV UP PARAM F/U: HCPCS | Performed by: PHYSICIAN ASSISTANT

## 2025-07-31 PROCEDURE — 99214 OFFICE O/P EST MOD 30 MIN: CPT | Performed by: PHYSICIAN ASSISTANT

## 2025-07-31 PROCEDURE — 1036F TOBACCO NON-USER: CPT | Performed by: PHYSICIAN ASSISTANT

## 2025-07-31 RX ORDER — PREDNISONE 20 MG/1
20 TABLET ORAL DAILY
Qty: 5 TABLET | Refills: 0 | Status: SHIPPED | OUTPATIENT
Start: 2025-07-31 | End: 2025-08-05

## 2025-07-31 RX ORDER — ALBUTEROL SULFATE 90 UG/1
2 INHALANT RESPIRATORY (INHALATION) EVERY 4 HOURS PRN
Qty: 18 G | Refills: 0 | Status: SHIPPED | OUTPATIENT
Start: 2025-07-31

## 2025-07-31 RX ORDER — DOXYCYCLINE HYCLATE 100 MG
100 TABLET ORAL 2 TIMES DAILY
Qty: 20 TABLET | Refills: 0 | Status: SHIPPED | OUTPATIENT
Start: 2025-07-31 | End: 2025-08-10

## 2025-07-31 RX ORDER — FLUTICASONE PROPIONATE AND SALMETEROL 250; 50 UG/1; UG/1
1 POWDER RESPIRATORY (INHALATION) EVERY 12 HOURS
Qty: 60 EACH | Refills: 3 | Status: SHIPPED | OUTPATIENT
Start: 2025-07-31

## 2025-07-31 ASSESSMENT — ENCOUNTER SYMPTOMS
COUGH: 1
CONSTIPATION: 0
NAUSEA: 0
SHORTNESS OF BREATH: 1
PHOTOPHOBIA: 0
DIARRHEA: 0
BLOOD IN STOOL: 0
WHEEZING: 1
VOMITING: 0
EYE DISCHARGE: 0
EYE REDNESS: 0
ABDOMINAL PAIN: 0
SORE THROAT: 1
COLOR CHANGE: 0
RHINORRHEA: 0
CHEST TIGHTNESS: 1
BACK PAIN: 0
EYE PAIN: 0

## 2025-07-31 NOTE — PROGRESS NOTES
Negrito Nicole (:  1960) is a 65 y.o. male,Established patient, here for evaluation of the following chief complaint(s):    Cold Symptoms (Pt states is having cough, congestion, sore throat, headache, fatigued- ongoing for a week.)        ASSESSMENT/PLAN:  Assessment & Plan  1. Cough.  - Worsening cough with green phlegm, congestion, fatigue, headache, sore throat. No fever, chills, sweats, nausea, vomiting, diarrhea.  - Exam: faint scattered wheezing, congestion, good air movement.  - Restart Advair 1 puff AM/PM, rinse mouth after. Prednisone 20 mg for 5 days. Doxycycline 100 mg BID for 10 days.  - Use rescue inhaler PRN for wheezing/coughing fits. Tessalon Perles every 8 hours PRN. Prescriptions sent to pharmacy. Return if no improvement in a week or symptoms worsen, seek ER for chest x-ray.  1. Bronchitis  -     doxycycline hyclate (VIBRA-TABS) 100 MG tablet; Take 1 tablet by mouth 2 times daily for 10 days, Disp-20 tablet, R-0Normal  -     predniSONE (DELTASONE) 20 MG tablet; Take 1 tablet by mouth daily for 5 days, Disp-5 tablet, R-0Normal  2. Cough variant asthma  -     fluticasone-salmeterol (ADVAIR) 250-50 MCG/ACT AEPB diskus inhaler; Inhale 1 puff into the lungs in the morning and 1 puff in the evening., Disp-60 each, R-3Normal  -     albuterol sulfate HFA (VENTOLIN HFA) 108 (90 Base) MCG/ACT inhaler; Inhale 2 puffs into the lungs every 4 hours as needed for Wheezing or Shortness of Breath (cough), Disp-18 g, R-0Normal      Return if symptoms worsen or fail to improve, for Follow Up.      SUBJECTIVE/OBJECTIVE:  HPI  History of Present Illness  The patient presents for evaluation of a persistent cough for the past week, which began after visiting an old, [place] house with dogs and cats. Symptoms include green phlegm, congestion, fatigue, headache, sore throat, increased wheezing, chest tightness, slight postnasal drip, and general headache. No fever, chills, sweats, nausea, vomiting, diarrhea, or ear

## 2025-08-12 ENCOUNTER — TELEPHONE (OUTPATIENT)
Dept: BARIATRICS/WEIGHT MGMT | Age: 65
End: 2025-08-12

## 2025-08-12 ENCOUNTER — OFFICE VISIT (OUTPATIENT)
Dept: BARIATRICS/WEIGHT MGMT | Age: 65
End: 2025-08-12
Payer: MEDICARE

## 2025-08-12 VITALS
BODY MASS INDEX: 34.6 KG/M2 | SYSTOLIC BLOOD PRESSURE: 142 MMHG | HEIGHT: 70 IN | HEART RATE: 64 BPM | OXYGEN SATURATION: 97 % | WEIGHT: 241.7 LBS | DIASTOLIC BLOOD PRESSURE: 94 MMHG

## 2025-08-12 DIAGNOSIS — Z79.899 MEDICATION MANAGEMENT: Primary | ICD-10-CM

## 2025-08-12 DIAGNOSIS — E66.9 OBESITY (BMI 30-39.9): ICD-10-CM

## 2025-08-12 PROCEDURE — 1036F TOBACCO NON-USER: CPT | Performed by: NURSE PRACTITIONER

## 2025-08-12 PROCEDURE — 3017F COLORECTAL CA SCREEN DOC REV: CPT | Performed by: NURSE PRACTITIONER

## 2025-08-12 PROCEDURE — 36415 COLL VENOUS BLD VENIPUNCTURE: CPT | Performed by: NURSE PRACTITIONER

## 2025-08-12 PROCEDURE — G8417 CALC BMI ABV UP PARAM F/U: HCPCS | Performed by: NURSE PRACTITIONER

## 2025-08-12 PROCEDURE — 99214 OFFICE O/P EST MOD 30 MIN: CPT | Performed by: NURSE PRACTITIONER

## 2025-08-12 PROCEDURE — 1123F ACP DISCUSS/DSCN MKR DOCD: CPT | Performed by: NURSE PRACTITIONER

## 2025-08-12 PROCEDURE — G8427 DOCREV CUR MEDS BY ELIG CLIN: HCPCS | Performed by: NURSE PRACTITIONER

## 2025-08-12 RX ORDER — PHENTERMINE HYDROCHLORIDE 37.5 MG/1
37.5 TABLET ORAL
Qty: 30 TABLET | Refills: 0 | Status: SHIPPED | OUTPATIENT
Start: 2025-08-12 | End: 2025-09-11

## 2025-08-13 LAB
ALBUMIN SERPL-MCNC: 4.3 G/DL (ref 3.4–5)
ALBUMIN/GLOB SERPL: 2.3 {RATIO} (ref 1.1–2.2)
ALP SERPL-CCNC: 68 U/L (ref 40–129)
ALT SERPL-CCNC: 30 U/L (ref 10–40)
ANION GAP SERPL CALCULATED.3IONS-SCNC: 11 MMOL/L (ref 3–16)
AST SERPL-CCNC: 30 U/L (ref 15–37)
BILIRUB SERPL-MCNC: 1.5 MG/DL (ref 0–1)
BUN SERPL-MCNC: 12 MG/DL (ref 7–20)
CALCIUM SERPL-MCNC: 8.8 MG/DL (ref 8.3–10.6)
CHLORIDE SERPL-SCNC: 109 MMOL/L (ref 99–110)
CO2 SERPL-SCNC: 21 MMOL/L (ref 21–32)
CREAT SERPL-MCNC: 0.9 MG/DL (ref 0.8–1.3)
GFR SERPLBLD CREATININE-BSD FMLA CKD-EPI: >90 ML/MIN/{1.73_M2}
GLUCOSE SERPL-MCNC: 123 MG/DL (ref 70–99)
POTASSIUM SERPL-SCNC: 4.5 MMOL/L (ref 3.5–5.1)
PROT SERPL-MCNC: 6.2 G/DL (ref 6.4–8.2)
SODIUM SERPL-SCNC: 141 MMOL/L (ref 136–145)

## 2025-08-25 DIAGNOSIS — R97.20 ELEVATED PSA: ICD-10-CM

## 2025-08-25 DIAGNOSIS — R39.9 LOWER URINARY TRACT SYMPTOMS (LUTS): ICD-10-CM

## 2025-08-25 RX ORDER — FINASTERIDE 5 MG/1
5 TABLET, FILM COATED ORAL DAILY
Qty: 60 TABLET | Refills: 0 | Status: SHIPPED | OUTPATIENT
Start: 2025-08-25

## 2025-08-25 RX ORDER — TAMSULOSIN HYDROCHLORIDE 0.4 MG/1
0.4 CAPSULE ORAL DAILY
Qty: 60 CAPSULE | Refills: 0 | Status: SHIPPED | OUTPATIENT
Start: 2025-08-25

## (undated) DEVICE — TRAP,MUCUS SPECIMEN,40CC: Brand: MEDLINE

## (undated) DEVICE — COVER,TABLE,44X90,STERILE: Brand: MEDLINE

## (undated) DEVICE — KIT DRP FOR RIO ROBOTIC ARM ASST SYS

## (undated) DEVICE — SUTURE VCRL SZ 2-0 L18IN ABSRB UD CT-1 L36MM 1/2 CIR J839D

## (undated) DEVICE — SOLUTION IV 100ML 0.9% SOD CHL PLAS CONT USP VIAFLX 1 PER

## (undated) DEVICE — SUTURE STRATAFIX SPRL SZ 1 L14IN ABSRB VLT L48CM CTX 1/2 SXPD2B405

## (undated) DEVICE — SYSTEM SKIN CLSR 22CM DERMBND PRINEO

## (undated) DEVICE — BOOT POS LEG DEMAYO

## (undated) DEVICE — ENDOSCOPY KIT: Brand: MEDLINE INDUSTRIES, INC.

## (undated) DEVICE — PIN BNE FIX TEMP L140MM DIA4MM MAKO

## (undated) DEVICE — TUBE ET DIA9MM NSL ORAL BASIC CUF MURPHY EYE RADPQ MRK

## (undated) DEVICE — ZIMMER® STERILE DISPOSABLE TOURNIQUET CUFF WITH PLC, DUAL PORT, SINGLE BLADDER, 30 IN. (76 CM)

## (undated) DEVICE — GLOVE ORANGE PI 8   MSG9080

## (undated) DEVICE — SINGLE USE SUCTION VALVE MAJ-209: Brand: SINGLE USE SUCTION VALVE (STERILE)

## (undated) DEVICE — CLASSIC CLD THER SYS

## (undated) DEVICE — STOCKING,ANTI-EMBOLISM,T-L,XL REG,LF: Brand: MEDLINE

## (undated) DEVICE — GOWN ISOLATN XL YEL M WT SIDE TIE LEV 2

## (undated) DEVICE — NEEDLE HYPO 20GA L1.5IN YEL POLYPR HUB S STL REG BVL STR

## (undated) DEVICE — 1870+ HEALTH CARE PART RESP. 120/CASE: Brand: AURA™

## (undated) DEVICE — 4-PORT MANIFOLD: Brand: NEPTUNE 2

## (undated) DEVICE — SPONGE LAP W18XL18IN WHT COT 4 PLY FLD STRUNG RADPQ DISP ST 2 PER PACK

## (undated) DEVICE — ZIMMER® STERILE DISPOSABLE TOURNIQUET CUFF WITH PLC, DUAL PORT, SINGLE BLADDER, 34 IN. (86 CM)

## (undated) DEVICE — VALVE BX DISP

## (undated) DEVICE — HOOD: Brand: T7PLUS

## (undated) DEVICE — BANDAGE ELASTIC  HONYCMB 6.0INX15YD

## (undated) DEVICE — Device: Brand: POWER-FLO®

## (undated) DEVICE — BW-411B DISP COMBO CLEANING BRUSH: Brand: SINGLE USE COMBINATION CLEANING BRUSH

## (undated) DEVICE — CORD RETRCT SIL

## (undated) DEVICE — SYRINGE MED 10ML SLIP TIP BLNT FILL AND LUERLOCK DISP

## (undated) DEVICE — SYSTEM VAC MIX SGL DBL CLEARMIX 10 PER CA

## (undated) DEVICE — GLOVE ORANGE PI 7 1/2   MSG9075

## (undated) DEVICE — TOWEL,OR,DSP,ST,BLUE,STD,6/PK,12PK/CS: Brand: MEDLINE

## (undated) DEVICE — KIT INT FIX FEM TIB CKPT MAKOPLASTY

## (undated) DEVICE — SYRINGE MED 30ML STD CLR PLAS LUERLOCK TIP N CTRL DISP

## (undated) DEVICE — ET TUBE HOLDER W/BLUE STRAP

## (undated) DEVICE — PAD CLD R HIP REG HOSE NONSTERILE

## (undated) DEVICE — T4 HOOD

## (undated) DEVICE — DUAL CUT SAGITTAL BLADE

## (undated) DEVICE — Z INACTIVE NO ACTIVE SUPPLIER APPLICATOR MEDICATED 26 CC TINT HI-LITE ORNG STRL CHLORAPREP

## (undated) DEVICE — HOOD WITH PEEL AWAY FACE SHIELD: Brand: T7PLUS

## (undated) DEVICE — SUTURE ABSORBABLE 3-0 PS-1 18 IN UD MONOCRYL + STRATAFIX SXMP1B102

## (undated) DEVICE — 3M™ STERI-DRAPE™ U-DRAPE 1015: Brand: STERI-DRAPE™

## (undated) DEVICE — KIT TRK KNEE PROC VIZADISC

## (undated) DEVICE — CONMED DISPOSABLE BRONCHIAL CYTOLOGY BRUSH, STRAIGHT HANDLE, Ø2 MM: Brand: CONMED

## (undated) DEVICE — SINGLE-USE BIOPSY FORCEPS: Brand: RADIAL JAW 4

## (undated) DEVICE — GOWN,SURGICAL,AURORA,SLEEVE: Brand: MEDLINE

## (undated) DEVICE — PIN BNE FIX L110MM DIA32MM

## (undated) DEVICE — BLADE SURG SAW STD S STL OSC W/ SERR EDGE DISP

## (undated) DEVICE — SMALL BOWL SET PLUS-LF: Brand: MEDLINE INDUSTRIES, INC.

## (undated) DEVICE — SURGICAL PROCEDURE PACK TOT KNEE LF